# Patient Record
Sex: MALE | Race: WHITE | Employment: OTHER | URBAN - NONMETROPOLITAN AREA
[De-identification: names, ages, dates, MRNs, and addresses within clinical notes are randomized per-mention and may not be internally consistent; named-entity substitution may affect disease eponyms.]

---

## 2017-01-20 ENCOUNTER — OFFICE VISIT (OUTPATIENT)
Dept: FAMILY MEDICINE CLINIC | Age: 42
End: 2017-01-20

## 2017-01-20 VITALS
HEART RATE: 112 BPM | SYSTOLIC BLOOD PRESSURE: 136 MMHG | BODY MASS INDEX: 33.8 KG/M2 | HEIGHT: 68 IN | RESPIRATION RATE: 18 BRPM | WEIGHT: 223 LBS | DIASTOLIC BLOOD PRESSURE: 90 MMHG | OXYGEN SATURATION: 95 %

## 2017-01-20 DIAGNOSIS — E11.9 TYPE 2 DIABETES MELLITUS WITHOUT COMPLICATION, WITHOUT LONG-TERM CURRENT USE OF INSULIN (HCC): Primary | ICD-10-CM

## 2017-01-20 DIAGNOSIS — Z00.00 MEDICARE ANNUAL WELLNESS VISIT, INITIAL: ICD-10-CM

## 2017-01-20 DIAGNOSIS — F84.0 AUTISM DISORDER: ICD-10-CM

## 2017-01-20 DIAGNOSIS — E78.2 MIXED HYPERLIPIDEMIA: ICD-10-CM

## 2017-01-20 PROCEDURE — 1036F TOBACCO NON-USER: CPT | Performed by: FAMILY MEDICINE

## 2017-01-20 PROCEDURE — G8419 CALC BMI OUT NRM PARAM NOF/U: HCPCS | Performed by: FAMILY MEDICINE

## 2017-01-20 PROCEDURE — G8484 FLU IMMUNIZE NO ADMIN: HCPCS | Performed by: FAMILY MEDICINE

## 2017-01-20 PROCEDURE — G8427 DOCREV CUR MEDS BY ELIG CLIN: HCPCS | Performed by: FAMILY MEDICINE

## 2017-01-20 PROCEDURE — 99214 OFFICE O/P EST MOD 30 MIN: CPT | Performed by: FAMILY MEDICINE

## 2017-01-20 PROCEDURE — G0438 PPPS, INITIAL VISIT: HCPCS | Performed by: FAMILY MEDICINE

## 2017-01-20 PROCEDURE — 3045F PR MOST RECENT HEMOGLOBIN A1C LEVEL 7.0-9.0%: CPT | Performed by: FAMILY MEDICINE

## 2017-01-20 RX ORDER — METFORMIN HYDROCHLORIDE 500 MG/1
TABLET, EXTENDED RELEASE ORAL
Qty: 180 TABLET | Refills: 1 | Status: SHIPPED | OUTPATIENT
Start: 2017-01-20 | End: 2017-01-23 | Stop reason: SDUPTHER

## 2017-01-20 RX ORDER — ATORVASTATIN CALCIUM 20 MG/1
20 TABLET, FILM COATED ORAL DAILY
Qty: 90 TABLET | Refills: 1 | Status: SHIPPED | OUTPATIENT
Start: 2017-01-20 | End: 2017-01-23 | Stop reason: SDUPTHER

## 2017-01-20 ASSESSMENT — ENCOUNTER SYMPTOMS
EYE DISCHARGE: 0
ABDOMINAL PAIN: 0
SHORTNESS OF BREATH: 0
SINUS PRESSURE: 0
NAUSEA: 0
DIARRHEA: 0
EYE REDNESS: 0
CONSTIPATION: 0
COUGH: 0
RHINORRHEA: 0
SORE THROAT: 0
WHEEZING: 0
TROUBLE SWALLOWING: 0
VOMITING: 0

## 2017-01-23 RX ORDER — METFORMIN HYDROCHLORIDE 500 MG/1
TABLET, EXTENDED RELEASE ORAL
Qty: 180 TABLET | Refills: 1 | Status: SHIPPED | OUTPATIENT
Start: 2017-01-23 | End: 2017-07-21 | Stop reason: SDUPTHER

## 2017-01-23 RX ORDER — ATORVASTATIN CALCIUM 20 MG/1
20 TABLET, FILM COATED ORAL DAILY
Qty: 90 TABLET | Refills: 1 | Status: SHIPPED | OUTPATIENT
Start: 2017-01-23 | End: 2017-07-21 | Stop reason: SDUPTHER

## 2017-07-21 ENCOUNTER — OFFICE VISIT (OUTPATIENT)
Dept: FAMILY MEDICINE CLINIC | Age: 42
End: 2017-07-21
Payer: MEDICARE

## 2017-07-21 VITALS
HEIGHT: 68 IN | HEART RATE: 80 BPM | RESPIRATION RATE: 18 BRPM | BODY MASS INDEX: 33.49 KG/M2 | DIASTOLIC BLOOD PRESSURE: 80 MMHG | SYSTOLIC BLOOD PRESSURE: 126 MMHG | WEIGHT: 221 LBS

## 2017-07-21 DIAGNOSIS — E11.9 TYPE 2 DIABETES MELLITUS WITHOUT COMPLICATION, WITHOUT LONG-TERM CURRENT USE OF INSULIN (HCC): Primary | ICD-10-CM

## 2017-07-21 DIAGNOSIS — F84.0 AUTISM DISORDER: ICD-10-CM

## 2017-07-21 DIAGNOSIS — E78.2 MIXED HYPERLIPIDEMIA: ICD-10-CM

## 2017-07-21 PROCEDURE — 3046F HEMOGLOBIN A1C LEVEL >9.0%: CPT | Performed by: FAMILY MEDICINE

## 2017-07-21 PROCEDURE — 1036F TOBACCO NON-USER: CPT | Performed by: FAMILY MEDICINE

## 2017-07-21 PROCEDURE — G8427 DOCREV CUR MEDS BY ELIG CLIN: HCPCS | Performed by: FAMILY MEDICINE

## 2017-07-21 PROCEDURE — 99214 OFFICE O/P EST MOD 30 MIN: CPT | Performed by: FAMILY MEDICINE

## 2017-07-21 PROCEDURE — G8417 CALC BMI ABV UP PARAM F/U: HCPCS | Performed by: FAMILY MEDICINE

## 2017-07-21 RX ORDER — GLIMEPIRIDE 2 MG/1
2 TABLET ORAL
Qty: 90 TABLET | Refills: 1 | Status: SHIPPED | OUTPATIENT
Start: 2017-07-21 | End: 2018-05-04 | Stop reason: SDUPTHER

## 2017-07-21 RX ORDER — METFORMIN HYDROCHLORIDE 500 MG/1
500 TABLET, EXTENDED RELEASE ORAL 2 TIMES DAILY
Qty: 180 TABLET | Refills: 1 | Status: SHIPPED | OUTPATIENT
Start: 2017-07-21 | End: 2017-10-19

## 2017-07-21 RX ORDER — ATORVASTATIN CALCIUM 40 MG/1
40 TABLET, FILM COATED ORAL DAILY
Qty: 90 TABLET | Refills: 1 | Status: SHIPPED | OUTPATIENT
Start: 2017-07-21 | End: 2018-05-04 | Stop reason: SDUPTHER

## 2017-07-21 ASSESSMENT — ENCOUNTER SYMPTOMS
NAUSEA: 0
RHINORRHEA: 0
SORE THROAT: 0
WHEEZING: 0
COUGH: 0
SINUS PRESSURE: 0
DIARRHEA: 0
EYE DISCHARGE: 0
TROUBLE SWALLOWING: 0
CONSTIPATION: 0
VOMITING: 0
ABDOMINAL PAIN: 0
SHORTNESS OF BREATH: 0
EYE REDNESS: 0

## 2017-07-21 ASSESSMENT — PATIENT HEALTH QUESTIONNAIRE - PHQ9
1. LITTLE INTEREST OR PLEASURE IN DOING THINGS: 0
2. FEELING DOWN, DEPRESSED OR HOPELESS: 0
SUM OF ALL RESPONSES TO PHQ9 QUESTIONS 1 & 2: 0
SUM OF ALL RESPONSES TO PHQ QUESTIONS 1-9: 0

## 2017-07-24 DIAGNOSIS — E11.9 TYPE 2 DIABETES MELLITUS WITHOUT COMPLICATION, WITHOUT LONG-TERM CURRENT USE OF INSULIN (HCC): ICD-10-CM

## 2017-07-24 LAB
CREATININE URINE: 32.1 MG/DL (ref 39–259)
MICROALBUMIN/CREAT 24H UR: <12 MG/L
MICROALBUMIN/CREAT UR-RTO: ABNORMAL MCG/MG CREAT

## 2017-07-24 PROCEDURE — 36415 COLL VENOUS BLD VENIPUNCTURE: CPT | Performed by: FAMILY MEDICINE

## 2017-07-24 PROCEDURE — 82570 ASSAY OF URINE CREATININE: CPT | Performed by: FAMILY MEDICINE

## 2017-07-24 PROCEDURE — 82043 UR ALBUMIN QUANTITATIVE: CPT | Performed by: FAMILY MEDICINE

## 2017-10-19 RX ORDER — METFORMIN HYDROCHLORIDE 500 MG/1
1000 TABLET, EXTENDED RELEASE ORAL NIGHTLY
Qty: 180 TABLET | Refills: 0 | Status: SHIPPED | OUTPATIENT
Start: 2017-10-19 | End: 2018-05-04 | Stop reason: SDUPTHER

## 2018-02-09 ENCOUNTER — TELEPHONE (OUTPATIENT)
Dept: PRIMARY CARE CLINIC | Age: 43
End: 2018-02-09

## 2018-03-20 ENCOUNTER — TELEPHONE (OUTPATIENT)
Dept: PRIMARY CARE CLINIC | Age: 43
End: 2018-03-20

## 2018-05-04 ENCOUNTER — OFFICE VISIT (OUTPATIENT)
Dept: FAMILY MEDICINE CLINIC | Age: 43
End: 2018-05-04
Payer: MEDICARE

## 2018-05-04 VITALS
BODY MASS INDEX: 33.95 KG/M2 | DIASTOLIC BLOOD PRESSURE: 88 MMHG | HEART RATE: 84 BPM | HEIGHT: 68 IN | WEIGHT: 224 LBS | SYSTOLIC BLOOD PRESSURE: 138 MMHG | RESPIRATION RATE: 16 BRPM

## 2018-05-04 DIAGNOSIS — F84.0 AUTISM DISORDER: ICD-10-CM

## 2018-05-04 DIAGNOSIS — E11.9 CONTROLLED TYPE 2 DIABETES MELLITUS WITHOUT COMPLICATION, WITHOUT LONG-TERM CURRENT USE OF INSULIN (HCC): Primary | ICD-10-CM

## 2018-05-04 DIAGNOSIS — E78.2 MIXED HYPERLIPIDEMIA: ICD-10-CM

## 2018-05-04 PROCEDURE — 99214 OFFICE O/P EST MOD 30 MIN: CPT | Performed by: FAMILY MEDICINE

## 2018-05-04 PROCEDURE — 1036F TOBACCO NON-USER: CPT | Performed by: FAMILY MEDICINE

## 2018-05-04 PROCEDURE — G8417 CALC BMI ABV UP PARAM F/U: HCPCS | Performed by: FAMILY MEDICINE

## 2018-05-04 PROCEDURE — 3046F HEMOGLOBIN A1C LEVEL >9.0%: CPT | Performed by: FAMILY MEDICINE

## 2018-05-04 PROCEDURE — G8427 DOCREV CUR MEDS BY ELIG CLIN: HCPCS | Performed by: FAMILY MEDICINE

## 2018-05-04 PROCEDURE — 2022F DILAT RTA XM EVC RTNOPTHY: CPT | Performed by: FAMILY MEDICINE

## 2018-05-04 RX ORDER — METFORMIN HYDROCHLORIDE 500 MG/1
1000 TABLET, EXTENDED RELEASE ORAL NIGHTLY
Qty: 30 TABLET | Refills: 0 | Status: SHIPPED | OUTPATIENT
Start: 2018-05-04 | End: 2018-05-18 | Stop reason: SDUPTHER

## 2018-05-04 RX ORDER — GLIMEPIRIDE 2 MG/1
2 TABLET ORAL
Qty: 15 TABLET | Refills: 0 | Status: SHIPPED | OUTPATIENT
Start: 2018-05-04 | End: 2018-05-18 | Stop reason: DRUGHIGH

## 2018-05-04 RX ORDER — ATORVASTATIN CALCIUM 40 MG/1
40 TABLET, FILM COATED ORAL DAILY
Qty: 15 TABLET | Refills: 0 | Status: SHIPPED | OUTPATIENT
Start: 2018-05-04 | End: 2018-05-18 | Stop reason: SDUPTHER

## 2018-05-04 ASSESSMENT — ENCOUNTER SYMPTOMS
SHORTNESS OF BREATH: 0
NAUSEA: 0
COUGH: 0
RHINORRHEA: 0
WHEEZING: 0
DIARRHEA: 0
SORE THROAT: 0
EYE REDNESS: 0
EYE DISCHARGE: 0
SINUS PRESSURE: 0
CONSTIPATION: 0
TROUBLE SWALLOWING: 0
VOMITING: 0
ABDOMINAL PAIN: 0

## 2018-05-04 ASSESSMENT — PATIENT HEALTH QUESTIONNAIRE - PHQ9
SUM OF ALL RESPONSES TO PHQ9 QUESTIONS 1 & 2: 0
SUM OF ALL RESPONSES TO PHQ QUESTIONS 1-9: 0
1. LITTLE INTEREST OR PLEASURE IN DOING THINGS: 0
2. FEELING DOWN, DEPRESSED OR HOPELESS: 0

## 2018-05-17 ENCOUNTER — HOSPITAL ENCOUNTER (OUTPATIENT)
Dept: LAB | Age: 43
Setting detail: SPECIMEN
Discharge: HOME | End: 2018-05-17
Payer: MEDICARE

## 2018-05-17 DIAGNOSIS — E78.2 MIXED HYPERLIPIDEMIA: ICD-10-CM

## 2018-05-17 DIAGNOSIS — E11.9 TYPE 2 DIABETES MELLITUS WITHOUT COMPLICATION, WITHOUT LONG-TERM CURRENT USE OF INSULIN (HCC): ICD-10-CM

## 2018-05-17 LAB
ABSOLUTE EOS #: 0.1 K/UL (ref 0–0.4)
ABSOLUTE IMMATURE GRANULOCYTE: NORMAL K/UL (ref 0–0.3)
ABSOLUTE LYMPH #: 2 K/UL (ref 1–4.8)
ABSOLUTE MONO #: 0.7 K/UL (ref 0.1–1.2)
ALBUMIN SERPL-MCNC: 4.2 G/DL (ref 3.5–5.2)
ALBUMIN/GLOBULIN RATIO: 1.2 (ref 1–2.5)
ALP BLD-CCNC: 54 U/L (ref 40–129)
ALT SERPL-CCNC: 29 U/L (ref 5–41)
ANION GAP SERPL CALCULATED.3IONS-SCNC: 13 MMOL/L (ref 9–17)
AST SERPL-CCNC: 26 U/L
BASOPHILS # BLD: 1 % (ref 0–2)
BASOPHILS ABSOLUTE: 0.1 K/UL (ref 0–0.2)
BILIRUB SERPL-MCNC: 0.4 MG/DL (ref 0.3–1.2)
BUN BLDV-MCNC: 11 MG/DL (ref 6–20)
BUN/CREAT BLD: 15 (ref 9–20)
CALCIUM SERPL-MCNC: 9.7 MG/DL (ref 8.6–10.4)
CHLORIDE BLD-SCNC: 96 MMOL/L (ref 98–107)
CHOLESTEROL/HDL RATIO: 3.1
CHOLESTEROL: 198 MG/DL
CO2: 29 MMOL/L (ref 20–31)
CREAT SERPL-MCNC: 0.72 MG/DL (ref 0.7–1.2)
DIFFERENTIAL TYPE: NORMAL
EOSINOPHILS RELATIVE PERCENT: 2 % (ref 1–8)
ESTIMATED AVERAGE GLUCOSE: 235 MG/DL
GFR AFRICAN AMERICAN: >60 ML/MIN
GFR NON-AFRICAN AMERICAN: >60 ML/MIN
GFR SERPL CREATININE-BSD FRML MDRD: ABNORMAL ML/MIN/{1.73_M2}
GFR SERPL CREATININE-BSD FRML MDRD: ABNORMAL ML/MIN/{1.73_M2}
GLUCOSE BLD-MCNC: 202 MG/DL (ref 70–99)
HBA1C MFR BLD: 9.8 % (ref 4.8–5.9)
HCT VFR BLD CALC: 46 % (ref 41–53)
HDLC SERPL-MCNC: 63 MG/DL
HEMOGLOBIN: 15.7 G/DL (ref 13.5–17.5)
IMMATURE GRANULOCYTES: NORMAL %
LDL CHOLESTEROL: 105 MG/DL (ref 0–130)
LYMPHOCYTES # BLD: 23 % (ref 15–43)
MCH RBC QN AUTO: 30.6 PG (ref 26–34)
MCHC RBC AUTO-ENTMCNC: 34.1 G/DL (ref 31–37)
MCV RBC AUTO: 89.5 FL (ref 80–100)
MONOCYTES # BLD: 8 % (ref 6–14)
NRBC AUTOMATED: NORMAL PER 100 WBC
PDW BLD-RTO: 13 % (ref 11–14.5)
PLATELET # BLD: 213 K/UL (ref 140–450)
PLATELET ESTIMATE: NORMAL
PMV BLD AUTO: 8.9 FL (ref 6–12)
POTASSIUM SERPL-SCNC: 3.7 MMOL/L (ref 3.7–5.3)
RBC # BLD: 5.14 M/UL (ref 4.5–5.9)
RBC # BLD: NORMAL 10*6/UL
SEG NEUTROPHILS: 66 % (ref 44–74)
SEGMENTED NEUTROPHILS ABSOLUTE COUNT: 5.9 K/UL (ref 1.8–7.7)
SODIUM BLD-SCNC: 138 MMOL/L (ref 135–144)
TOTAL PROTEIN: 7.6 G/DL (ref 6.4–8.3)
TRIGL SERPL-MCNC: 149 MG/DL
VLDLC SERPL CALC-MCNC: NORMAL MG/DL (ref 1–30)
WBC # BLD: 8.7 K/UL (ref 3.5–11)
WBC # BLD: NORMAL 10*3/UL

## 2018-05-17 PROCEDURE — 85025 COMPLETE CBC W/AUTO DIFF WBC: CPT

## 2018-05-17 PROCEDURE — 80053 COMPREHEN METABOLIC PANEL: CPT

## 2018-05-17 PROCEDURE — 83036 HEMOGLOBIN GLYCOSYLATED A1C: CPT

## 2018-05-17 PROCEDURE — 36415 COLL VENOUS BLD VENIPUNCTURE: CPT

## 2018-05-17 PROCEDURE — 80061 LIPID PANEL: CPT

## 2018-05-18 RX ORDER — GLIMEPIRIDE 4 MG/1
4 TABLET ORAL EVERY MORNING
Qty: 90 TABLET | Refills: 1 | Status: SHIPPED | OUTPATIENT
Start: 2018-05-18 | End: 2018-12-10 | Stop reason: SDUPTHER

## 2018-05-18 RX ORDER — ATORVASTATIN CALCIUM 40 MG/1
40 TABLET, FILM COATED ORAL DAILY
Qty: 90 TABLET | Refills: 1 | Status: SHIPPED | OUTPATIENT
Start: 2018-05-18 | End: 2018-12-10 | Stop reason: SDUPTHER

## 2018-05-18 RX ORDER — METFORMIN HYDROCHLORIDE 500 MG/1
1000 TABLET, EXTENDED RELEASE ORAL NIGHTLY
Qty: 180 TABLET | Refills: 1 | Status: SHIPPED | OUTPATIENT
Start: 2018-05-18 | End: 2018-12-10 | Stop reason: SDUPTHER

## 2018-09-27 PROBLEM — Z00.00 MEDICARE ANNUAL WELLNESS VISIT, INITIAL: Status: RESOLVED | Noted: 2017-01-20 | Resolved: 2018-09-27

## 2018-11-08 ENCOUNTER — TELEPHONE (OUTPATIENT)
Dept: PRIMARY CARE CLINIC | Age: 43
End: 2018-11-08

## 2018-12-10 ENCOUNTER — OFFICE VISIT (OUTPATIENT)
Dept: FAMILY MEDICINE CLINIC | Age: 43
End: 2018-12-10
Payer: MEDICARE

## 2018-12-10 VITALS
RESPIRATION RATE: 18 BRPM | WEIGHT: 216 LBS | BODY MASS INDEX: 32.74 KG/M2 | HEIGHT: 68 IN | DIASTOLIC BLOOD PRESSURE: 84 MMHG | SYSTOLIC BLOOD PRESSURE: 120 MMHG

## 2018-12-10 DIAGNOSIS — E78.2 MIXED HYPERLIPIDEMIA: ICD-10-CM

## 2018-12-10 DIAGNOSIS — E11.9 CONTROLLED TYPE 2 DIABETES MELLITUS WITHOUT COMPLICATION, WITHOUT LONG-TERM CURRENT USE OF INSULIN (HCC): Primary | ICD-10-CM

## 2018-12-10 PROCEDURE — 99213 OFFICE O/P EST LOW 20 MIN: CPT | Performed by: FAMILY MEDICINE

## 2018-12-10 PROCEDURE — 3046F HEMOGLOBIN A1C LEVEL >9.0%: CPT | Performed by: FAMILY MEDICINE

## 2018-12-10 PROCEDURE — 1036F TOBACCO NON-USER: CPT | Performed by: FAMILY MEDICINE

## 2018-12-10 PROCEDURE — G8427 DOCREV CUR MEDS BY ELIG CLIN: HCPCS | Performed by: FAMILY MEDICINE

## 2018-12-10 PROCEDURE — 2022F DILAT RTA XM EVC RTNOPTHY: CPT | Performed by: FAMILY MEDICINE

## 2018-12-10 PROCEDURE — G8484 FLU IMMUNIZE NO ADMIN: HCPCS | Performed by: FAMILY MEDICINE

## 2018-12-10 PROCEDURE — G8417 CALC BMI ABV UP PARAM F/U: HCPCS | Performed by: FAMILY MEDICINE

## 2018-12-10 RX ORDER — ATORVASTATIN CALCIUM 40 MG/1
40 TABLET, FILM COATED ORAL DAILY
Qty: 90 TABLET | Refills: 1 | Status: SHIPPED | OUTPATIENT
Start: 2018-12-10 | End: 2019-03-25 | Stop reason: SDUPTHER

## 2018-12-10 RX ORDER — METFORMIN HYDROCHLORIDE 500 MG/1
1000 TABLET, EXTENDED RELEASE ORAL NIGHTLY
Qty: 180 TABLET | Refills: 1 | Status: SHIPPED | OUTPATIENT
Start: 2018-12-10 | End: 2019-06-11 | Stop reason: SDUPTHER

## 2018-12-10 RX ORDER — GLIMEPIRIDE 4 MG/1
4 TABLET ORAL EVERY MORNING
Qty: 90 TABLET | Refills: 1 | Status: SHIPPED | OUTPATIENT
Start: 2018-12-10 | End: 2019-03-25 | Stop reason: SDUPTHER

## 2018-12-10 ASSESSMENT — PATIENT HEALTH QUESTIONNAIRE - PHQ9
SUM OF ALL RESPONSES TO PHQ QUESTIONS 1-9: 0
SUM OF ALL RESPONSES TO PHQ9 QUESTIONS 1 & 2: 0
1. LITTLE INTEREST OR PLEASURE IN DOING THINGS: 0
SUM OF ALL RESPONSES TO PHQ QUESTIONS 1-9: 0
2. FEELING DOWN, DEPRESSED OR HOPELESS: 0

## 2018-12-10 NOTE — PATIENT INSTRUCTIONS
Patient Education        Diabetes Foot Health: Care Instructions  Your Care Instructions    When you have diabetes, your feet need extra care and attention. Diabetes can damage the nerve endings and blood vessels in your feet, making you less likely to notice when your feet are injured. Diabetes also limits your body's ability to fight infection and get blood to areas that need it. If you get a minor foot injury, it could become an ulcer or a serious infection. With good foot care, you can prevent most of these problems. Caring for your feet can be quick and easy. Most of the care can be done when you are bathing or getting ready for bed. Follow-up care is a key part of your treatment and safety. Be sure to make and go to all appointments, and call your doctor if you are having problems. It's also a good idea to know your test results and keep a list of the medicines you take. How can you care for yourself at home? · Keep your blood sugar close to normal by watching what and how much you eat, monitoring blood sugar, taking medicines if prescribed, and getting regular exercise. · Do not smoke. Smoking affects blood flow and can make foot problems worse. If you need help quitting, talk to your doctor about stop-smoking programs and medicines. These can increase your chances of quitting for good. · Eat a diet that is low in fats. High fat intake can cause fat to build up in your blood vessels and decrease blood flow. · Inspect your feet daily for blisters, cuts, cracks, or sores. If you cannot see well, use a mirror or have someone help you. · Take care of your feet:  ? Wash your feet every day. Use warm (not hot) water. Check the water temperature with your wrists or other part of your body, not your feet. ? Dry your feet well. Pat them dry. Do not rub the skin on your feet too hard. Dry well between your toes.  If the skin on your feet stays moist, bacteria or a fungus can grow, which can lead to

## 2018-12-10 NOTE — PROGRESS NOTES
Patient/sister declines hiv screening; flu, pneumonia, and tdap vaccines. Activated myChart account today. Has not had eye exam as he will not sit still long enough to complete.

## 2018-12-14 ASSESSMENT — ENCOUNTER SYMPTOMS
SINUS PRESSURE: 0
DIARRHEA: 0
EYE DISCHARGE: 0
TROUBLE SWALLOWING: 0
VOMITING: 0
NAUSEA: 0
SORE THROAT: 0
WHEEZING: 0
EYE REDNESS: 0
COUGH: 0
ABDOMINAL PAIN: 0
RHINORRHEA: 0
CONSTIPATION: 0
SHORTNESS OF BREATH: 0

## 2018-12-14 NOTE — PROGRESS NOTES
12/10/2018     Tariq Melendez (:  1975) is a 37 y.o. male, here for evaluation of the following medical concerns:    HPI  Patient comes in today for follow up of chronic health issues   Chief Complaint   Patient presents with    Diabetes     6 mo f/u; foot exam; planned visit    Hyperlipidemia     6 mo f/u    Other     autism; 6 mo f/u   . Patient Comes in today with his sister Marsha Verma who is now his guardian. His mom who had cared for him for his entire life has passed away and so now he is under the care of his sister. He is doing relatively well adapting. They did not get his lab work done prior to his office visit but his sister notes she will get it in the near future. She notes that he does not really follow any specific dietary plan. He eats quite a bit daily. She states that he eats spaghetti O's out of the can quite frequently. Needs a fairly high carbohydrate rich diet. She is making sure that he is consistently taking his medication so we will see what the status of his diabetes is with his updated lab work. As he is autistic it is difficult to get him to eat healthy dietary intake as he is used to certain foods that he likes and does not like to deviate from that. She continues to work to try to get him to eat healthier options. Does have a known history of hyperlipidemia and is taking Lipitor without any difficulty. His last labs in May did show that his cholesterol was reasonably well-controlled on his current dose. Again will need reassessment with updated lab work which she is going to get in the next 2-3 weeks duration. Otherwise today no other acute medical concerns. .  Patient's recent lab reports are as follows:    Results for orders placed or performed during the hospital encounter of 18   CBC Auto Differential   Result Value Ref Range    WBC 8.7 3.5 - 11.0 k/uL    RBC 5.14 4.5 - 5.9 m/uL    Hemoglobin 15.7 13.5 - 17.5 g/dL    Hematocrit 46.0 41 - 53 %    MCV 89.5 noted below. Did review patient's med list, allergies, social history, fam history, pmhx and pshx today as noted in the record. Preventative measures are reviewed today. See health maintenance section for complete preventative plan of care. Review of Systems   Constitutional: Negative for chills, fatigue and fever. HENT: Negative for congestion, ear pain, postnasal drip, rhinorrhea, sinus pressure, sore throat and trouble swallowing. Eyes: Negative for discharge and redness. Respiratory: Negative for cough, shortness of breath and wheezing. Cardiovascular: Negative for chest pain. Gastrointestinal: Negative for abdominal pain, constipation, diarrhea, nausea and vomiting. Musculoskeletal: Negative for arthralgias, myalgias and neck pain. Skin: Negative for rash and wound. Allergic/Immunologic: Negative for environmental allergies. Neurological: Negative for dizziness, weakness, light-headedness and headaches. Hematological: Negative for adenopathy. Psychiatric/Behavioral: Negative. Prior to Visit Medications    Medication Sig Taking? Authorizing Provider   glimepiride (AMARYL) 4 MG tablet Take 1 tablet by mouth every morning Yes Jaz Rutherford DO   atorvastatin (LIPITOR) 40 MG tablet Take 1 tablet by mouth daily Yes Jaz Rutherford DO   metFORMIN (GLUCOPHAGE-XR) 500 MG extended release tablet Take 2 tablets by mouth nightly Yes Jaz Rutherford, DO        Social History   Substance Use Topics    Smoking status: Never Smoker    Smokeless tobacco: Never Used    Alcohol use No        Vitals:    12/10/18 1610   BP: 120/84   Site: Left Upper Arm   Position: Sitting   Cuff Size: Large Adult   Pulse: Comment: patient uncooperative   Resp: 18   Weight: 216 lb (98 kg)   Height: 5' 8\" (1.727 m)     Estimated body mass index is 32.84 kg/m² as calculated from the following:    Height as of this encounter: 5' 8\" (1.727 m).     Weight as of this encounter: 216 lb (98

## 2018-12-17 ENCOUNTER — TELEPHONE (OUTPATIENT)
Dept: PRIMARY CARE CLINIC | Age: 43
End: 2018-12-17

## 2019-01-28 ENCOUNTER — TELEPHONE (OUTPATIENT)
Dept: PRIMARY CARE CLINIC | Age: 44
End: 2019-01-28

## 2019-03-08 ENCOUNTER — TELEPHONE (OUTPATIENT)
Dept: PRIMARY CARE CLINIC | Age: 44
End: 2019-03-08

## 2019-03-25 RX ORDER — ATORVASTATIN CALCIUM 40 MG/1
40 TABLET, FILM COATED ORAL DAILY
Qty: 90 TABLET | Refills: 0 | Status: SHIPPED | OUTPATIENT
Start: 2019-03-25 | End: 2019-06-11 | Stop reason: SDUPTHER

## 2019-03-25 RX ORDER — GLIMEPIRIDE 4 MG/1
4 TABLET ORAL EVERY MORNING
Qty: 90 TABLET | Refills: 0 | Status: SHIPPED | OUTPATIENT
Start: 2019-03-25 | End: 2019-06-11 | Stop reason: SDUPTHER

## 2019-06-07 ENCOUNTER — HOSPITAL ENCOUNTER (OUTPATIENT)
Dept: LAB | Age: 44
Discharge: HOME | End: 2019-06-07
Payer: MEDICARE

## 2019-06-07 DIAGNOSIS — E11.9 CONTROLLED TYPE 2 DIABETES MELLITUS WITHOUT COMPLICATION, WITHOUT LONG-TERM CURRENT USE OF INSULIN (HCC): Primary | ICD-10-CM

## 2019-06-07 DIAGNOSIS — E78.2 MIXED HYPERLIPIDEMIA: ICD-10-CM

## 2019-06-07 DIAGNOSIS — R03.0 ELEVATED BLOOD PRESSURE READING: ICD-10-CM

## 2019-06-07 DIAGNOSIS — E11.9 CONTROLLED TYPE 2 DIABETES MELLITUS WITHOUT COMPLICATION, WITHOUT LONG-TERM CURRENT USE OF INSULIN (HCC): ICD-10-CM

## 2019-06-07 LAB
ABSOLUTE EOS #: 0.1 K/UL (ref 0–0.4)
ABSOLUTE IMMATURE GRANULOCYTE: NORMAL K/UL (ref 0–0.3)
ABSOLUTE LYMPH #: 1.5 K/UL (ref 1–4.8)
ABSOLUTE MONO #: 0.5 K/UL (ref 0.1–1.2)
ALBUMIN SERPL-MCNC: 4.7 G/DL (ref 3.5–5.2)
ALBUMIN/GLOBULIN RATIO: 1.4 (ref 1–2.5)
ALP BLD-CCNC: 51 U/L (ref 40–129)
ALT SERPL-CCNC: 16 U/L (ref 5–41)
ANION GAP SERPL CALCULATED.3IONS-SCNC: 11 MMOL/L (ref 9–17)
AST SERPL-CCNC: 20 U/L
BASOPHILS # BLD: 1 % (ref 0–2)
BASOPHILS ABSOLUTE: 0 K/UL (ref 0–0.2)
BILIRUB SERPL-MCNC: 0.51 MG/DL (ref 0.3–1.2)
BUN BLDV-MCNC: 12 MG/DL (ref 6–20)
BUN/CREAT BLD: 16 (ref 9–20)
CALCIUM SERPL-MCNC: 10.1 MG/DL (ref 8.6–10.4)
CHLORIDE BLD-SCNC: 100 MMOL/L (ref 98–107)
CHOLESTEROL/HDL RATIO: 2
CHOLESTEROL: 129 MG/DL
CO2: 27 MMOL/L (ref 20–31)
CREAT SERPL-MCNC: 0.74 MG/DL (ref 0.7–1.2)
CREATININE URINE: 131 MG/DL (ref 39–259)
DIFFERENTIAL TYPE: NORMAL
EOSINOPHILS RELATIVE PERCENT: 1 % (ref 1–8)
ESTIMATED AVERAGE GLUCOSE: 143 MG/DL
GFR AFRICAN AMERICAN: >60 ML/MIN
GFR NON-AFRICAN AMERICAN: >60 ML/MIN
GFR SERPL CREATININE-BSD FRML MDRD: ABNORMAL ML/MIN/{1.73_M2}
GFR SERPL CREATININE-BSD FRML MDRD: ABNORMAL ML/MIN/{1.73_M2}
GLUCOSE BLD-MCNC: 106 MG/DL (ref 70–99)
HBA1C MFR BLD: 6.6 % (ref 4.8–5.9)
HCT VFR BLD CALC: 49.4 % (ref 41–53)
HDLC SERPL-MCNC: 63 MG/DL
HEMOGLOBIN: 16.6 G/DL (ref 13.5–17.5)
IMMATURE GRANULOCYTES: NORMAL %
LDL CHOLESTEROL: 45 MG/DL (ref 0–130)
LYMPHOCYTES # BLD: 21 % (ref 15–43)
MCH RBC QN AUTO: 30.5 PG (ref 26–34)
MCHC RBC AUTO-ENTMCNC: 33.6 G/DL (ref 31–37)
MCV RBC AUTO: 90.7 FL (ref 80–100)
MICROALBUMIN/CREAT 24H UR: <12 MG/L
MICROALBUMIN/CREAT UR-RTO: NORMAL MCG/MG CREAT
MONOCYTES # BLD: 7 % (ref 6–14)
NRBC AUTOMATED: NORMAL PER 100 WBC
PDW BLD-RTO: 13.3 % (ref 11–14.5)
PLATELET # BLD: 257 K/UL (ref 140–450)
PLATELET ESTIMATE: NORMAL
PMV BLD AUTO: 7.8 FL (ref 6–12)
POTASSIUM SERPL-SCNC: 4 MMOL/L (ref 3.7–5.3)
RBC # BLD: 5.45 M/UL (ref 4.5–5.9)
RBC # BLD: NORMAL 10*6/UL
SEG NEUTROPHILS: 70 % (ref 44–74)
SEGMENTED NEUTROPHILS ABSOLUTE COUNT: 5 K/UL (ref 1.8–7.7)
SODIUM BLD-SCNC: 138 MMOL/L (ref 135–144)
TOTAL PROTEIN: 8.1 G/DL (ref 6.4–8.3)
TRIGL SERPL-MCNC: 106 MG/DL
VLDLC SERPL CALC-MCNC: NORMAL MG/DL (ref 1–30)
WBC # BLD: 7.1 K/UL (ref 3.5–11)
WBC # BLD: NORMAL 10*3/UL

## 2019-06-07 PROCEDURE — 85025 COMPLETE CBC W/AUTO DIFF WBC: CPT

## 2019-06-07 PROCEDURE — 80061 LIPID PANEL: CPT

## 2019-06-07 PROCEDURE — 36415 COLL VENOUS BLD VENIPUNCTURE: CPT

## 2019-06-07 PROCEDURE — 82570 ASSAY OF URINE CREATININE: CPT

## 2019-06-07 PROCEDURE — 83036 HEMOGLOBIN GLYCOSYLATED A1C: CPT

## 2019-06-07 PROCEDURE — 82043 UR ALBUMIN QUANTITATIVE: CPT

## 2019-06-07 PROCEDURE — 80053 COMPREHEN METABOLIC PANEL: CPT

## 2019-06-11 ENCOUNTER — OFFICE VISIT (OUTPATIENT)
Dept: FAMILY MEDICINE CLINIC | Age: 44
End: 2019-06-11
Payer: MEDICARE

## 2019-06-11 VITALS
SYSTOLIC BLOOD PRESSURE: 138 MMHG | RESPIRATION RATE: 18 BRPM | BODY MASS INDEX: 30.92 KG/M2 | HEART RATE: 92 BPM | HEIGHT: 68 IN | DIASTOLIC BLOOD PRESSURE: 84 MMHG | WEIGHT: 204 LBS

## 2019-06-11 DIAGNOSIS — E78.2 MIXED HYPERLIPIDEMIA: ICD-10-CM

## 2019-06-11 DIAGNOSIS — F84.0 AUTISM: ICD-10-CM

## 2019-06-11 DIAGNOSIS — E11.9 CONTROLLED TYPE 2 DIABETES MELLITUS WITHOUT COMPLICATION, WITHOUT LONG-TERM CURRENT USE OF INSULIN (HCC): Primary | ICD-10-CM

## 2019-06-11 DIAGNOSIS — Z00.00 MEDICARE ANNUAL WELLNESS VISIT, SUBSEQUENT: ICD-10-CM

## 2019-06-11 PROCEDURE — G0439 PPPS, SUBSEQ VISIT: HCPCS | Performed by: FAMILY MEDICINE

## 2019-06-11 PROCEDURE — 99213 OFFICE O/P EST LOW 20 MIN: CPT | Performed by: FAMILY MEDICINE

## 2019-06-11 PROCEDURE — 2022F DILAT RTA XM EVC RTNOPTHY: CPT | Performed by: FAMILY MEDICINE

## 2019-06-11 PROCEDURE — G8427 DOCREV CUR MEDS BY ELIG CLIN: HCPCS | Performed by: FAMILY MEDICINE

## 2019-06-11 PROCEDURE — 1036F TOBACCO NON-USER: CPT | Performed by: FAMILY MEDICINE

## 2019-06-11 PROCEDURE — 3044F HG A1C LEVEL LT 7.0%: CPT | Performed by: FAMILY MEDICINE

## 2019-06-11 PROCEDURE — G8417 CALC BMI ABV UP PARAM F/U: HCPCS | Performed by: FAMILY MEDICINE

## 2019-06-11 RX ORDER — METFORMIN HYDROCHLORIDE 500 MG/1
1000 TABLET, EXTENDED RELEASE ORAL NIGHTLY
Qty: 180 TABLET | Refills: 1 | Status: SHIPPED | OUTPATIENT
Start: 2019-06-11 | End: 2019-12-20 | Stop reason: SDUPTHER

## 2019-06-11 RX ORDER — GLIMEPIRIDE 4 MG/1
4 TABLET ORAL EVERY MORNING
Qty: 90 TABLET | Refills: 0 | Status: SHIPPED | OUTPATIENT
Start: 2019-06-11 | End: 2019-09-21 | Stop reason: SDUPTHER

## 2019-06-11 RX ORDER — ATORVASTATIN CALCIUM 40 MG/1
40 TABLET, FILM COATED ORAL DAILY
Qty: 90 TABLET | Refills: 0 | Status: SHIPPED | OUTPATIENT
Start: 2019-06-11 | End: 2019-09-21 | Stop reason: SDUPTHER

## 2019-06-11 ASSESSMENT — ENCOUNTER SYMPTOMS
COUGH: 0
EYE DISCHARGE: 0
RHINORRHEA: 0
CONSTIPATION: 0
SORE THROAT: 0
TROUBLE SWALLOWING: 0
DIARRHEA: 0
VOMITING: 0
EYE REDNESS: 0
ABDOMINAL PAIN: 0
SHORTNESS OF BREATH: 0
NAUSEA: 0
WHEEZING: 0
SINUS PRESSURE: 0

## 2019-06-11 ASSESSMENT — PATIENT HEALTH QUESTIONNAIRE - PHQ9
SUM OF ALL RESPONSES TO PHQ QUESTIONS 1-9: 0
SUM OF ALL RESPONSES TO PHQ QUESTIONS 1-9: 0

## 2019-06-11 ASSESSMENT — LIFESTYLE VARIABLES: HOW OFTEN DO YOU HAVE A DRINK CONTAINING ALCOHOL: 0

## 2019-06-11 ASSESSMENT — ANXIETY QUESTIONNAIRES: GAD7 TOTAL SCORE: 0

## 2019-06-11 NOTE — PROGRESS NOTES
did you need help from others to take care of any of the following? Laundry, housekeeping, banking/finances, shopping, telephone use, food preparation, transportation, or taking medications?: [employer], Housekeeping, Banking/Finances, Shopping, Telephone Use, Food Preparation, Transportation, Taking Medications  ADL Interventions:  · sister is guardian and caregiver. Assists with ADLs and IADLs    Personalized Preventive Plan   Current Health Maintenance Status    There is no immunization history on file for this patient. Health Maintenance   Topic Date Due    Pneumococcal 0-64 years Vaccine (1 of 1 - PPSV23) Sister declined    Diabetic retinal exam  Sister declined. Too difficult to assess patient due to autism    Hepatitis B Vaccine (1 of 3 - Risk 3-dose series) Sister declined    DTaP/Tdap/Td vaccine (1 - Tdap) Sister declined    Diabetic foot exam  Performed today    Flu vaccine (Season Ended) 09/01/2019    A1C test (Diabetic or Prediabetic)  06/07/2020    Diabetic microalbuminuria test  06/07/2020    Lipid screen  06/07/2020    HIV screen  Discontinued     Recommendations for Preventive Services Due: see orders and patient instructions/AVS.  .   Recommended screening schedule for the next 5-10 years is provided to the patient in written form: see Patient Instructions/AVS.

## 2019-06-11 NOTE — PROGRESS NOTES
2019     Marlon Monterroso (:  1975) is a 37 y.o. male, here for evaluation of the following medical concerns:    HPI  Patient comes in today for follow up of chronic health issues   Chief Complaint   Patient presents with    Medicare AWV     subsequent; last 17    Diabetes     6 mo f/u    Hyperlipidemia     6 mo f/u    Other     autism; 6 mo f/u    Discuss Labs     drawn 19   . Patient comes in today for follow-up of his chronic health conditions. Does come in today with his sister who is his guardian and caregiver. She does not report any acute issues at this time. He has been more active as his sister makes him do more physical activity and she does try to make him follow a healthy diet. As a result his diabetes mellitus type 2 is controlled with his current medical regimen. He is really been better with dietary intake thanks to his sister's dietary assistance. Does have a known history of hyperlipidemia and his cholesterol levels are stable and controlled on his current lipid-lowering medication. He is autistic but seems to be adapting well to his sister taking care of him since his mother . Otherwise today no other acute medical concerns. .  Patient's recent lab reports are as follows:    Results for orders placed or performed during the hospital encounter of 19   Comprehensive Metabolic Panel   Result Value Ref Range    Glucose 106 (H) 70 - 99 mg/dL    BUN 12 6 - 20 mg/dL    CREATININE 0.74 0.70 - 1.20 mg/dL    Bun/Cre Ratio 16 9 - 20    Calcium 10.1 8.6 - 10.4 mg/dL    Sodium 138 135 - 144 mmol/L    Potassium 4.0 3.7 - 5.3 mmol/L    Chloride 100 98 - 107 mmol/L    CO2 27 20 - 31 mmol/L    Anion Gap 11 9 - 17 mmol/L    Alkaline Phosphatase 51 40 - 129 U/L    ALT 16 5 - 41 U/L    AST 20 <40 U/L    Total Bilirubin 0.51 0.3 - 1.2 mg/dL    Total Protein 8.1 6.4 - 8.3 g/dL    Alb 4.7 3.5 - 5.2 g/dL    Albumin/Globulin Ratio 1.4 1.0 - 2.5    GFR Non-African American >60 >60 mL/min    GFR African American >60 >60 mL/min    GFR Comment          GFR Staging NOT REPORTED    CBC With Auto Differential   Result Value Ref Range    WBC 7.1 3.5 - 11.0 k/uL    RBC 5.45 4.5 - 5.9 m/uL    Hemoglobin 16.6 13.5 - 17.5 g/dL    Hematocrit 49.4 41 - 53 %    MCV 90.7 80 - 100 fL    MCH 30.5 26 - 34 pg    MCHC 33.6 31 - 37 g/dL    RDW 13.3 11.0 - 14.5 %    Platelets 910 693 - 311 k/uL    MPV 7.8 6.0 - 12.0 fL    NRBC Automated NOT REPORTED per 100 WBC    Differential Type NOT REPORTED     Immature Granulocytes NOT REPORTED 0 %    Absolute Immature Granulocyte NOT REPORTED 0.00 - 0.30 k/uL    WBC Morphology NOT REPORTED     RBC Morphology NOT REPORTED     Platelet Estimate NOT REPORTED     Seg Neutrophils 70 44 - 74 %    Lymphocytes 21 15 - 43 %    Monocytes 7 6 - 14 %    Eosinophils % 1 1 - 8 %    Basophils 1 0 - 2 %    Segs Absolute 5.00 1.8 - 7.7 k/uL    Absolute Lymph # 1.50 1.0 - 4.8 k/uL    Absolute Mono # 0.50 0.1 - 1.2 k/uL    Absolute Eos # 0.10 0.0 - 0.4 k/uL    Basophils # 0.00 0.0 - 0.2 k/uL   Lipid Panel   Result Value Ref Range    Cholesterol 129 <200 mg/dL    HDL 63 >40 mg/dL    LDL Cholesterol 45 0 - 130 mg/dL    Chol/HDL Ratio 2.0 <5    Triglycerides 106 <150 mg/dL    VLDL NOT REPORTED 1 - 30 mg/dL   Hemoglobin A1C   Result Value Ref Range    Hemoglobin A1C 6.6 (H) 4.8 - 5.9 %    Estimated Avg Glucose 143 mg/dL   Microalbumin, Ur   Result Value Ref Range    Microalb, Ur <12 <21 mg/L    Creatinine, Ur 131.0 39.0 - 259.0 mg/dL    Microalb/Crt. Ratio CANNOT BE CALCULATED <17 mcg/mg creat     Did discuss dietary modification and increased exercise to keep cholesterol and blood sugar under good control. Other review of systems are as noted below. Did review patient's med list, allergies, social history, fam history, pmhx and pshx today as noted in the record. Preventative measures are reviewed today. See health maintenance section for complete preventative plan of care.       Review of normal.   Nose: Nose normal.   Mouth/Throat: Oropharynx is clear and moist. No oropharyngeal exudate. Eyes: Pupils are equal, round, and reactive to light. Conjunctivae and EOM are normal. Right eye exhibits no discharge. Left eye exhibits no discharge. Neck: Normal range of motion. Neck supple. No thyromegaly present. Cardiovascular: Normal rate, regular rhythm and normal heart sounds. Pulmonary/Chest: Effort normal and breath sounds normal. He has no wheezes. He has no rales. Abdominal: Soft. Bowel sounds are normal.   Musculoskeletal: He exhibits no edema. Patient had a diabetic foot exam today. No open areas or ulcerations noted. Fine filament testing to the entire dorsal and plantar aspect of the foot reveals good sensation in all areas. No cyanosis. +2/4 pedal pulses, symmetric bilaterally   Lymphadenopathy:     He has no cervical adenopathy. Neurological: He is alert and oriented to person, place, and time. Skin: Skin is warm and dry. No rash noted. He is not diaphoretic. Psychiatric: He has a normal mood and affect. His behavior is normal. Judgment and thought content normal.   Nursing note and vitals reviewed. ASSESSMENT/PLAN:  1. Controlled type 2 diabetes mellitus without complication, without long-term current use of insulin (HCC)  Stable and controlled on his current medical regimen and with dietary efforts. Continued low-carb/low sugar diet and increased exercise encouraged. -  DIABETES FOOT EXAM  - CBC Auto Differential; Future  - Comprehensive Metabolic Panel; Future  - Hemoglobin A1C; Future    2. Mixed hyperlipidemia  Stable controlled on his current statin medication. Is tolerating this without difficulty. - Lipid Panel; Future    3. Autism  Sister is guardian and primary care giver. Stable. 4. Medicare annual wellness visit, subsequent  See separate annual wellness visit note for review.     Orders Placed This Encounter   Medications    atorvastatin (LIPITOR) 40 MG tablet     Sig: Take 1 tablet by mouth daily     Dispense:  90 tablet     Refill:  0    glimepiride (AMARYL) 4 MG tablet     Sig: Take 1 tablet by mouth every morning     Dispense:  90 tablet     Refill:  0    metFORMIN (GLUCOPHAGE-XR) 500 MG extended release tablet     Sig: Take 2 tablets by mouth nightly     Dispense:  180 tablet     Refill:  1     Orders Placed This Encounter   Procedures    CBC Auto Differential     To be done in 6 months     Standing Status:   Future     Standing Expiration Date:   6/10/2020    Comprehensive Metabolic Panel     To be done in 6 months     Standing Status:   Future     Standing Expiration Date:   6/10/2020    Hemoglobin A1C     To be done in 6 months     Standing Status:   Future     Standing Expiration Date:   6/10/2020    Lipid Panel     To be done in 6 months     Standing Status:   Future     Standing Expiration Date:   6/10/2020     Order Specific Question:   Is Patient Fasting?/# of Hours     Answer:   12 hours     DIABETES FOOT EXAM     Patient is to return to my office in 6 months duration or sooner if any further problems or symptoms arise. (Please note that portions of this note were completed with a voice recognition program. Efforts were made to edit the dictations but occasionally words are mis-transcribed.)          Return in about 6 months (around 12/11/2019). An electronic signature was used to authenticate this note.     --Raphael Wynne, DO on 6/11/2019 at 6:05 PM

## 2019-06-11 NOTE — PROGRESS NOTES
Patient declines pneumonia, tdap, and hep b vaccines. Declines hiv screening.  Unable to effectively complete an eye exam.

## 2019-06-11 NOTE — PATIENT INSTRUCTIONS
Hospital Outpatient Visit on 06/07/2019   Component Date Value Ref Range Status    Glucose 06/07/2019 106* 70 - 99 mg/dL Final    BUN 06/07/2019 12  6 - 20 mg/dL Final    CREATININE 06/07/2019 0.74  0.70 - 1.20 mg/dL Final    Bun/Cre Ratio 06/07/2019 16  9 - 20 Final    Calcium 06/07/2019 10.1  8.6 - 10.4 mg/dL Final    Sodium 06/07/2019 138  135 - 144 mmol/L Final    Potassium 06/07/2019 4.0  3.7 - 5.3 mmol/L Final    Chloride 06/07/2019 100  98 - 107 mmol/L Final    CO2 06/07/2019 27  20 - 31 mmol/L Final    Anion Gap 06/07/2019 11  9 - 17 mmol/L Final    Alkaline Phosphatase 06/07/2019 51  40 - 129 U/L Final    ALT 06/07/2019 16  5 - 41 U/L Final    AST 06/07/2019 20  <40 U/L Final    Total Bilirubin 06/07/2019 0.51  0.3 - 1.2 mg/dL Final    Total Protein 06/07/2019 8.1  6.4 - 8.3 g/dL Final    Alb 06/07/2019 4.7  3.5 - 5.2 g/dL Final    Albumin/Globulin Ratio 06/07/2019 1.4  1.0 - 2.5 Final    GFR Non- 06/07/2019 >60  >60 mL/min Final    GFR  06/07/2019 >60  >60 mL/min Final    GFR Comment 06/07/2019        Final    Comment: Average GFR for 38-51 years old:   80 mL/min/1.73sq m  Chronic Kidney Disease:   <60 mL/min/1.73sq m  Kidney failure:   <15 mL/min/1.73sq m              eGFR calculated using average adult body mass.  Additional eGFR calculator available at:        [email or web address]            GFR Staging 06/07/2019 NOT REPORTED   Final    WBC 06/07/2019 7.1  3.5 - 11.0 k/uL Final    RBC 06/07/2019 5.45  4.5 - 5.9 m/uL Final    Hemoglobin 06/07/2019 16.6  13.5 - 17.5 g/dL Final    Hematocrit 06/07/2019 49.4  41 - 53 % Final    MCV 06/07/2019 90.7  80 - 100 fL Final    MCH 06/07/2019 30.5  26 - 34 pg Final    MCHC 06/07/2019 33.6  31 - 37 g/dL Final    RDW 06/07/2019 13.3  11.0 - 14.5 % Final    Platelets 79/63/8894 257  140 - 450 k/uL Final    MPV 06/07/2019 7.8  6.0 - 12.0 fL Final    NRBC Automated 06/07/2019 NOT <21 mg/L Final    Creatinine, Ur 06/07/2019 131.0  39.0 - 259.0 mg/dL Final    Microalb/Crt. Ratio 06/07/2019 CANNOT BE CALCULATED  <17 mcg/mg creat Final       Personalized Preventive Plan for Buddy Beal - 6/11/2019  Medicare offers a range of preventive health benefits. Some of the tests and screenings are paid in full while other may be subject to a deductible, co-insurance, and/or copay. Some of these benefits include a comprehensive review of your medical history including lifestyle, illnesses that may run in your family, and various assessments and screenings as appropriate. After reviewing your medical record and screening and assessments performed today your provider may have ordered immunizations, labs, imaging, and/or referrals for you. A list of these orders (if applicable) as well as your Preventive Care list are included within your After Visit Summary for your review. Other Preventive Recommendations:    · A preventive eye exam performed by an eye specialist is recommended every 1-2 years to screen for glaucoma; cataracts, macular degeneration, and other eye disorders. · A preventive dental visit is recommended every 6 months. · Try to get at least 150 minutes of exercise per week or 10,000 steps per day on a pedometer . · Order or download the FREE \"Exercise & Physical Activity: Your Everyday Guide\" from [employer]. Call 1-391.932.6645 or search [employer] online. · You need 1027-6846 mg of calcium and 6177-3494 IU of vitamin D per day. It is possible to meet your calcium requirement with diet alone, but a vitamin D supplement is usually necessary to meet this goal.  · When exposed to the sun, use a sunscreen that protects against both UVA and UVB radiation with an SPF of 30 or greater. Reapply every 2 to 3 hours or after sweating, drying off with a towel, or swimming. · Always wear a seat belt when traveling in a car.  Always wear a helmet

## 2019-09-23 RX ORDER — ATORVASTATIN CALCIUM 40 MG/1
TABLET, FILM COATED ORAL
Qty: 90 TABLET | Refills: 1 | Status: SHIPPED | OUTPATIENT
Start: 2019-09-23 | End: 2020-03-16

## 2019-09-23 RX ORDER — GLIMEPIRIDE 4 MG/1
TABLET ORAL
Qty: 90 TABLET | Refills: 1 | Status: SHIPPED | OUTPATIENT
Start: 2019-09-23 | End: 2020-03-16

## 2019-09-23 NOTE — TELEPHONE ENCOUNTER
Vito worthington called requesting a refill of the below medication which has been pended for you:     Requested Prescriptions     Pending Prescriptions Disp Refills    glimepiride (AMARYL) 4 MG tablet [Pharmacy Med Name: Glimepiride Oral Tablet 4 MG] 90 tablet 1     Sig: TAKE 1 TABLET BY MOUTH IN THE MORNING    atorvastatin (LIPITOR) 40 MG tablet [Pharmacy Med Name: Atorvastatin Calcium Oral Tablet 40 MG] 90 tablet 1     Sig: TAKE 1 TABLET BY MOUTH ONE TIME A DAY       Last Appointment Date: 6/11/2019  Next Appointment Date: 12/11/2019    No Known Allergies

## 2019-12-13 ENCOUNTER — HOSPITAL ENCOUNTER (OUTPATIENT)
Dept: LAB | Age: 44
Discharge: HOME | End: 2019-12-13
Payer: MEDICARE

## 2019-12-13 DIAGNOSIS — E11.9 CONTROLLED TYPE 2 DIABETES MELLITUS WITHOUT COMPLICATION, WITHOUT LONG-TERM CURRENT USE OF INSULIN (HCC): ICD-10-CM

## 2019-12-13 DIAGNOSIS — E78.2 MIXED HYPERLIPIDEMIA: ICD-10-CM

## 2019-12-13 LAB
ABSOLUTE EOS #: 0.1 K/UL (ref 0–0.4)
ABSOLUTE IMMATURE GRANULOCYTE: NORMAL K/UL (ref 0–0.3)
ABSOLUTE LYMPH #: 1.6 K/UL (ref 1–4.8)
ABSOLUTE MONO #: 0.6 K/UL (ref 0.1–1.2)
ALBUMIN SERPL-MCNC: 4.8 G/DL (ref 3.5–5.2)
ALBUMIN/GLOBULIN RATIO: 1.4 (ref 1–2.5)
ALP BLD-CCNC: 55 U/L (ref 40–129)
ALT SERPL-CCNC: 21 U/L (ref 5–41)
ANION GAP SERPL CALCULATED.3IONS-SCNC: 14 MMOL/L (ref 9–17)
AST SERPL-CCNC: 20 U/L
BASOPHILS # BLD: 1 % (ref 0–2)
BASOPHILS ABSOLUTE: 0 K/UL (ref 0–0.2)
BILIRUB SERPL-MCNC: 0.64 MG/DL (ref 0.3–1.2)
BUN BLDV-MCNC: 17 MG/DL (ref 6–20)
BUN/CREAT BLD: 22 (ref 9–20)
CALCIUM SERPL-MCNC: 10.3 MG/DL (ref 8.6–10.4)
CHLORIDE BLD-SCNC: 97 MMOL/L (ref 98–107)
CHOLESTEROL/HDL RATIO: 2.5
CHOLESTEROL: 150 MG/DL
CO2: 26 MMOL/L (ref 20–31)
CREAT SERPL-MCNC: 0.78 MG/DL (ref 0.7–1.2)
DIFFERENTIAL TYPE: NORMAL
EOSINOPHILS RELATIVE PERCENT: 1 % (ref 1–8)
ESTIMATED AVERAGE GLUCOSE: 146 MG/DL
GFR AFRICAN AMERICAN: >60 ML/MIN
GFR NON-AFRICAN AMERICAN: >60 ML/MIN
GFR SERPL CREATININE-BSD FRML MDRD: ABNORMAL ML/MIN/{1.73_M2}
GFR SERPL CREATININE-BSD FRML MDRD: ABNORMAL ML/MIN/{1.73_M2}
GLUCOSE BLD-MCNC: 143 MG/DL (ref 70–99)
HBA1C MFR BLD: 6.7 % (ref 4.8–5.9)
HCT VFR BLD CALC: 49.8 % (ref 41–53)
HDLC SERPL-MCNC: 60 MG/DL
HEMOGLOBIN: 16.7 G/DL (ref 13.5–17.5)
IMMATURE GRANULOCYTES: NORMAL %
LDL CHOLESTEROL: 73 MG/DL (ref 0–130)
LYMPHOCYTES # BLD: 17 % (ref 15–43)
MCH RBC QN AUTO: 31 PG (ref 26–34)
MCHC RBC AUTO-ENTMCNC: 33.5 G/DL (ref 31–37)
MCV RBC AUTO: 92.5 FL (ref 80–100)
MONOCYTES # BLD: 7 % (ref 6–14)
NRBC AUTOMATED: NORMAL PER 100 WBC
PDW BLD-RTO: 13.1 % (ref 11–14.5)
PLATELET # BLD: 283 K/UL (ref 140–450)
PLATELET ESTIMATE: NORMAL
PMV BLD AUTO: 9.1 FL (ref 6–12)
POTASSIUM SERPL-SCNC: 3.8 MMOL/L (ref 3.7–5.3)
RBC # BLD: 5.39 M/UL (ref 4.5–5.9)
RBC # BLD: NORMAL 10*6/UL
SEG NEUTROPHILS: 74 % (ref 44–74)
SEGMENTED NEUTROPHILS ABSOLUTE COUNT: 7 K/UL (ref 1.8–7.7)
SODIUM BLD-SCNC: 137 MMOL/L (ref 135–144)
TOTAL PROTEIN: 8.3 G/DL (ref 6.4–8.3)
TRIGL SERPL-MCNC: 83 MG/DL
VLDLC SERPL CALC-MCNC: NORMAL MG/DL (ref 1–30)
WBC # BLD: 9.3 K/UL (ref 3.5–11)
WBC # BLD: NORMAL 10*3/UL

## 2019-12-13 PROCEDURE — 85025 COMPLETE CBC W/AUTO DIFF WBC: CPT

## 2019-12-13 PROCEDURE — 80061 LIPID PANEL: CPT

## 2019-12-13 PROCEDURE — 80053 COMPREHEN METABOLIC PANEL: CPT

## 2019-12-13 PROCEDURE — 36415 COLL VENOUS BLD VENIPUNCTURE: CPT

## 2019-12-13 PROCEDURE — 83036 HEMOGLOBIN GLYCOSYLATED A1C: CPT

## 2019-12-20 ENCOUNTER — OFFICE VISIT (OUTPATIENT)
Dept: FAMILY MEDICINE CLINIC | Age: 44
End: 2019-12-20
Payer: MEDICARE

## 2019-12-20 VITALS
SYSTOLIC BLOOD PRESSURE: 134 MMHG | RESPIRATION RATE: 16 BRPM | BODY MASS INDEX: 31.67 KG/M2 | HEART RATE: 80 BPM | DIASTOLIC BLOOD PRESSURE: 86 MMHG | HEIGHT: 68 IN | WEIGHT: 209 LBS

## 2019-12-20 DIAGNOSIS — F84.0 AUTISM: ICD-10-CM

## 2019-12-20 DIAGNOSIS — E11.9 CONTROLLED TYPE 2 DIABETES MELLITUS WITHOUT COMPLICATION, WITHOUT LONG-TERM CURRENT USE OF INSULIN (HCC): Primary | ICD-10-CM

## 2019-12-20 DIAGNOSIS — E78.2 MIXED HYPERLIPIDEMIA: ICD-10-CM

## 2019-12-20 PROCEDURE — G8427 DOCREV CUR MEDS BY ELIG CLIN: HCPCS | Performed by: FAMILY MEDICINE

## 2019-12-20 PROCEDURE — 3044F HG A1C LEVEL LT 7.0%: CPT | Performed by: FAMILY MEDICINE

## 2019-12-20 PROCEDURE — G8417 CALC BMI ABV UP PARAM F/U: HCPCS | Performed by: FAMILY MEDICINE

## 2019-12-20 PROCEDURE — 1036F TOBACCO NON-USER: CPT | Performed by: FAMILY MEDICINE

## 2019-12-20 PROCEDURE — 99214 OFFICE O/P EST MOD 30 MIN: CPT | Performed by: FAMILY MEDICINE

## 2019-12-20 PROCEDURE — 2022F DILAT RTA XM EVC RTNOPTHY: CPT | Performed by: FAMILY MEDICINE

## 2019-12-20 PROCEDURE — G8484 FLU IMMUNIZE NO ADMIN: HCPCS | Performed by: FAMILY MEDICINE

## 2019-12-20 RX ORDER — METFORMIN HYDROCHLORIDE 500 MG/1
1000 TABLET, EXTENDED RELEASE ORAL NIGHTLY
Qty: 180 TABLET | Refills: 0 | OUTPATIENT
Start: 2019-12-20

## 2019-12-20 RX ORDER — METFORMIN HYDROCHLORIDE 500 MG/1
1000 TABLET, EXTENDED RELEASE ORAL NIGHTLY
Qty: 180 TABLET | Refills: 1 | Status: SHIPPED | OUTPATIENT
Start: 2019-12-20 | End: 2020-05-18 | Stop reason: SDUPTHER

## 2019-12-20 ASSESSMENT — PATIENT HEALTH QUESTIONNAIRE - PHQ9
SUM OF ALL RESPONSES TO PHQ9 QUESTIONS 1 & 2: 0
2. FEELING DOWN, DEPRESSED OR HOPELESS: 0
SUM OF ALL RESPONSES TO PHQ QUESTIONS 1-9: 0
1. LITTLE INTEREST OR PLEASURE IN DOING THINGS: 0
SUM OF ALL RESPONSES TO PHQ QUESTIONS 1-9: 0

## 2019-12-20 ASSESSMENT — ENCOUNTER SYMPTOMS
ABDOMINAL PAIN: 0
VOMITING: 0
NAUSEA: 0
SHORTNESS OF BREATH: 0
DIARRHEA: 0
EYE DISCHARGE: 0
CONSTIPATION: 0
EYE REDNESS: 0
TROUBLE SWALLOWING: 0
COUGH: 0
WHEEZING: 0
SINUS PRESSURE: 0
SORE THROAT: 0
RHINORRHEA: 0

## 2020-03-16 RX ORDER — ATORVASTATIN CALCIUM 40 MG/1
TABLET, FILM COATED ORAL
Qty: 90 TABLET | Refills: 1 | Status: SHIPPED | OUTPATIENT
Start: 2020-03-16 | End: 2020-05-18 | Stop reason: SDUPTHER

## 2020-03-16 RX ORDER — GLIMEPIRIDE 4 MG/1
TABLET ORAL
Qty: 90 TABLET | Refills: 1 | Status: SHIPPED | OUTPATIENT
Start: 2020-03-16 | End: 2020-05-18 | Stop reason: SDUPTHER

## 2020-03-16 NOTE — TELEPHONE ENCOUNTER
Cynthia Giang called requesting a refill of the below medication which has been pended for you:     Requested Prescriptions     Pending Prescriptions Disp Refills    atorvastatin (LIPITOR) 40 MG tablet [Pharmacy Med Name: Atorvastatin Calcium Oral Tablet 40 MG] 90 tablet 1     Sig: TAKE 1 TABLET BY MOUTH ONE TIME A DAY    glimepiride (AMARYL) 4 MG tablet [Pharmacy Med Name: Glimepiride Oral Tablet 4 MG] 90 tablet 1     Sig: TAKE 1 TABLET BY MOUTH IN THE MORNING       Last Appointment Date: 12/20/2019  Next Appointment Date: 6/19/2020    No Known Allergies

## 2020-04-28 ENCOUNTER — HOSPITAL ENCOUNTER (OUTPATIENT)
Age: 45
Setting detail: SPECIMEN
Discharge: HOME | End: 2020-04-28
Payer: MEDICARE

## 2020-04-28 ENCOUNTER — TELEPHONE (OUTPATIENT)
Dept: FAMILY MEDICINE CLINIC | Age: 45
End: 2020-04-28

## 2020-04-28 PROCEDURE — U0002 COVID-19 LAB TEST NON-CDC: HCPCS

## 2020-04-28 NOTE — TELEPHONE ENCOUNTER
Patients sister that is his caregiver was found  at home and they are having issues with placement until they can confirm the patient does not have COVID. Patient is not displaying symptoms but they are unsure what happened to sister. They were wondering if the patient could be hospitalized until COVID testing comes back that the health dept is going to do.

## 2020-04-29 NOTE — TELEPHONE ENCOUNTER
Got consent from rashad Carvalho to do [facility] and patient was transported to the office by the Board of  to have testing done.   This was completed on 4/28/20

## 2020-04-30 ENCOUNTER — TELEPHONE (OUTPATIENT)
Dept: FAMILY MEDICINE CLINIC | Age: 45
End: 2020-04-30

## 2020-04-30 NOTE — TELEPHONE ENCOUNTER
They need patients summary,future appts, if patient needs blood sugar checked, how often,paremeter, and who to call if patient is outside of paremeter if checking blood sugars email to [facility] [email or web address]    Do you want blood sugar checks?

## 2020-05-01 ENCOUNTER — TELEPHONE (OUTPATIENT)
Dept: FAMILY MEDICINE CLINIC | Age: 45
End: 2020-05-01

## 2020-05-01 LAB — SARS-COV-2, NAA: NOT DETECTED

## 2020-05-01 NOTE — TELEPHONE ENCOUNTER
Called Meme Auguste back and informed her we do not know anything about a PCN allergy. She stated at one time sister wondered if he had an allergy but was not sure. She states Marilu from [address] [employer] [employer] is going to stop by and  packet of information on patient such as future appts, lab orders, and patients summary. This information was left at the  to .

## 2020-05-13 ENCOUNTER — TELEPHONE (OUTPATIENT)
Dept: FAMILY MEDICINE CLINIC | Age: 45
End: 2020-05-13

## 2020-05-13 RX ORDER — RISPERIDONE 0.5 MG/1
0.5 TABLET, FILM COATED ORAL DAILY
Qty: 30 TABLET | Refills: 2 | Status: SHIPPED | OUTPATIENT
Start: 2020-05-13 | End: 2020-06-03 | Stop reason: SDUPTHER

## 2020-05-14 ENCOUNTER — HOSPITAL ENCOUNTER (OUTPATIENT)
Dept: LAB | Age: 45
Discharge: HOME | End: 2020-05-14
Payer: MEDICARE

## 2020-05-14 LAB
ABSOLUTE EOS #: 0.09 K/UL (ref 0–0.44)
ABSOLUTE IMMATURE GRANULOCYTE: <0.03 K/UL (ref 0–0.3)
ABSOLUTE LYMPH #: 1.26 K/UL (ref 1.1–3.7)
ABSOLUTE MONO #: 0.46 K/UL (ref 0.1–1.2)
ALBUMIN SERPL-MCNC: 4.5 G/DL (ref 3.5–5.2)
ALBUMIN/GLOBULIN RATIO: 1.6 (ref 1–2.5)
ALP BLD-CCNC: 43 U/L (ref 40–129)
ALT SERPL-CCNC: 19 U/L (ref 5–41)
ANION GAP SERPL CALCULATED.3IONS-SCNC: 13 MMOL/L (ref 9–17)
AST SERPL-CCNC: 18 U/L
BASOPHILS # BLD: 1 % (ref 0–2)
BASOPHILS ABSOLUTE: 0.05 K/UL (ref 0–0.2)
BILIRUB SERPL-MCNC: 0.36 MG/DL (ref 0.3–1.2)
BUN BLDV-MCNC: 14 MG/DL (ref 6–20)
BUN/CREAT BLD: 18 (ref 9–20)
CALCIUM SERPL-MCNC: 9.7 MG/DL (ref 8.6–10.4)
CHLORIDE BLD-SCNC: 101 MMOL/L (ref 98–107)
CHOLESTEROL/HDL RATIO: 1.8
CHOLESTEROL: 103 MG/DL
CO2: 26 MMOL/L (ref 20–31)
CREAT SERPL-MCNC: 0.79 MG/DL (ref 0.7–1.2)
DIFFERENTIAL TYPE: ABNORMAL
EOSINOPHILS RELATIVE PERCENT: 2 % (ref 1–4)
ESTIMATED AVERAGE GLUCOSE: 154 MG/DL
GFR AFRICAN AMERICAN: >60 ML/MIN
GFR NON-AFRICAN AMERICAN: >60 ML/MIN
GFR SERPL CREATININE-BSD FRML MDRD: ABNORMAL ML/MIN/{1.73_M2}
GFR SERPL CREATININE-BSD FRML MDRD: ABNORMAL ML/MIN/{1.73_M2}
GLUCOSE BLD-MCNC: 229 MG/DL (ref 70–99)
HBA1C MFR BLD: 7 % (ref 4.8–5.9)
HCT VFR BLD CALC: 46.9 % (ref 40.7–50.3)
HDLC SERPL-MCNC: 56 MG/DL
HEMOGLOBIN: 15.4 G/DL (ref 13–17)
IMMATURE GRANULOCYTES: 0 %
LDL CHOLESTEROL: 30 MG/DL (ref 0–130)
LYMPHOCYTES # BLD: 21 % (ref 24–43)
MCH RBC QN AUTO: 30.4 PG (ref 25.2–33.5)
MCHC RBC AUTO-ENTMCNC: 32.8 G/DL (ref 25.2–33.5)
MCV RBC AUTO: 92.5 FL (ref 82.6–102.9)
MONOCYTES # BLD: 8 % (ref 3–12)
NRBC AUTOMATED: 0 PER 100 WBC
PDW BLD-RTO: 12.6 % (ref 11.8–14.4)
PLATELET # BLD: 236 K/UL (ref 138–453)
PLATELET ESTIMATE: ABNORMAL
PMV BLD AUTO: 10.3 FL (ref 8.1–13.5)
POTASSIUM SERPL-SCNC: 4 MMOL/L (ref 3.7–5.3)
RBC # BLD: 5.07 M/UL (ref 4.21–5.77)
RBC # BLD: ABNORMAL 10*6/UL
SEG NEUTROPHILS: 68 % (ref 36–65)
SEGMENTED NEUTROPHILS ABSOLUTE COUNT: 4.27 K/UL (ref 1.5–8.1)
SODIUM BLD-SCNC: 140 MMOL/L (ref 135–144)
TOTAL PROTEIN: 7.4 G/DL (ref 6.4–8.3)
TRIGL SERPL-MCNC: 84 MG/DL
VLDLC SERPL CALC-MCNC: NORMAL MG/DL (ref 1–30)
WBC # BLD: 6.2 K/UL (ref 3.5–11.3)
WBC # BLD: ABNORMAL 10*3/UL

## 2020-05-14 PROCEDURE — 80053 COMPREHEN METABOLIC PANEL: CPT

## 2020-05-14 PROCEDURE — 83036 HEMOGLOBIN GLYCOSYLATED A1C: CPT

## 2020-05-14 PROCEDURE — 80061 LIPID PANEL: CPT

## 2020-05-14 PROCEDURE — 85025 COMPLETE CBC W/AUTO DIFF WBC: CPT

## 2020-05-14 PROCEDURE — 36415 COLL VENOUS BLD VENIPUNCTURE: CPT

## 2020-05-15 ENCOUNTER — HOSPITAL ENCOUNTER (OUTPATIENT)
Age: 45
Setting detail: SPECIMEN
Discharge: HOME | End: 2020-05-15
Payer: MEDICARE

## 2020-05-15 LAB
CREATININE URINE: 185.1 MG/DL (ref 39–259)
MICROALBUMIN/CREAT 24H UR: <12 MG/L
MICROALBUMIN/CREAT UR-RTO: NORMAL MCG/MG CREAT

## 2020-05-15 PROCEDURE — 82570 ASSAY OF URINE CREATININE: CPT

## 2020-05-15 PROCEDURE — 82043 UR ALBUMIN QUANTITATIVE: CPT

## 2020-05-18 ENCOUNTER — OFFICE VISIT (OUTPATIENT)
Dept: FAMILY MEDICINE CLINIC | Age: 45
End: 2020-05-18
Payer: MEDICARE

## 2020-05-18 VITALS
SYSTOLIC BLOOD PRESSURE: 126 MMHG | BODY MASS INDEX: 30.62 KG/M2 | HEART RATE: 80 BPM | DIASTOLIC BLOOD PRESSURE: 80 MMHG | HEIGHT: 68 IN | WEIGHT: 202 LBS

## 2020-05-18 PROCEDURE — 99213 OFFICE O/P EST LOW 20 MIN: CPT

## 2020-05-18 PROCEDURE — 99214 OFFICE O/P EST MOD 30 MIN: CPT | Performed by: FAMILY MEDICINE

## 2020-05-18 PROCEDURE — 1036F TOBACCO NON-USER: CPT | Performed by: FAMILY MEDICINE

## 2020-05-18 PROCEDURE — 90732 PPSV23 VACC 2 YRS+ SUBQ/IM: CPT

## 2020-05-18 PROCEDURE — 3051F HG A1C>EQUAL 7.0%<8.0%: CPT | Performed by: FAMILY MEDICINE

## 2020-05-18 PROCEDURE — G8427 DOCREV CUR MEDS BY ELIG CLIN: HCPCS | Performed by: FAMILY MEDICINE

## 2020-05-18 PROCEDURE — G8417 CALC BMI ABV UP PARAM F/U: HCPCS | Performed by: FAMILY MEDICINE

## 2020-05-18 PROCEDURE — 90715 TDAP VACCINE 7 YRS/> IM: CPT

## 2020-05-18 PROCEDURE — 2022F DILAT RTA XM EVC RTNOPTHY: CPT | Performed by: FAMILY MEDICINE

## 2020-05-18 RX ORDER — ATORVASTATIN CALCIUM 40 MG/1
TABLET, FILM COATED ORAL
Qty: 90 TABLET | Refills: 1 | Status: SHIPPED | OUTPATIENT
Start: 2020-05-18 | End: 2020-10-28

## 2020-05-18 RX ORDER — GLIMEPIRIDE 4 MG/1
TABLET ORAL
Qty: 90 TABLET | Refills: 1 | Status: SHIPPED | OUTPATIENT
Start: 2020-05-18 | End: 2020-10-28

## 2020-05-18 RX ORDER — METFORMIN HYDROCHLORIDE 500 MG/1
1000 TABLET, EXTENDED RELEASE ORAL NIGHTLY
Qty: 180 TABLET | Refills: 1 | Status: SHIPPED | OUTPATIENT
Start: 2020-05-18 | End: 2020-06-15

## 2020-05-18 ASSESSMENT — PATIENT HEALTH QUESTIONNAIRE - PHQ9
1. LITTLE INTEREST OR PLEASURE IN DOING THINGS: 0
SUM OF ALL RESPONSES TO PHQ9 QUESTIONS 1 & 2: 0
2. FEELING DOWN, DEPRESSED OR HOPELESS: 0
SUM OF ALL RESPONSES TO PHQ QUESTIONS 1-9: 0
SUM OF ALL RESPONSES TO PHQ QUESTIONS 1-9: 0

## 2020-05-18 ASSESSMENT — ENCOUNTER SYMPTOMS
VOMITING: 0
SINUS PRESSURE: 0
SHORTNESS OF BREATH: 0
DIARRHEA: 0
EYE REDNESS: 0
ABDOMINAL PAIN: 0
WHEEZING: 0
CONSTIPATION: 0
RHINORRHEA: 0
NAUSEA: 0
SORE THROAT: 0
COUGH: 0
EYE DISCHARGE: 0
TROUBLE SWALLOWING: 0

## 2020-05-18 NOTE — PROGRESS NOTES
as well. Encouraged continued work with him regarding dietary changes and I feel that they are doing a good job managing this. Patient otherwise has no other acute medical concerns at this time. .  Patient's recent lab reports are as follows:    Results for orders placed or performed during the hospital encounter of 05/15/20   Microalbumin, Ur   Result Value Ref Range    Microalb, Ur <12 <21 mg/L    Creatinine, Ur 185.1 39.0 - 259.0 mg/dL    Microalb/Crt. Ratio CANNOT BE CALCULATED <17 mcg/mg creat      Lab Results   Component Value Date    WBC 6.2 05/14/2020    RBC 5.07 05/14/2020    HGB 15.4 05/14/2020    HCT 46.9 05/14/2020    MCV 92.5 05/14/2020    MCH 30.4 05/14/2020    MCHC 32.8 05/14/2020    RDW 12.6 05/14/2020     05/14/2020    MPV 10.3 05/14/2020       Lab Results   Component Value Date    CHOL 103 05/14/2020    HDL 56 05/14/2020    CHOLHDLRATIO 1.8 05/14/2020    TRIG 84 05/14/2020    VLDL NOT REPORTED 05/14/2020       Lab Results   Component Value Date    TSH 1.58 07/14/2017       Lab Results   Component Value Date     05/14/2020    K 4.0 05/14/2020     05/14/2020    CO2 26 05/14/2020    BUN 14 05/14/2020    CREATININE 0.79 05/14/2020    GLUCOSE 229 05/14/2020    CALCIUM 9.7 05/14/2020       Lab Results   Component Value Date    LABA1C 7.0 05/14/2020         Other review of systems are as noted below. Preventative measures are reviewed today. See health maintenance section for complete preventative plan of care. Did review patient's med list, allergies, social history, fam history, pmhx and pshx today as noted in the record. Review of Systems   Constitutional: Negative for chills, fatigue and fever. HENT: Negative for congestion, ear pain, postnasal drip, rhinorrhea, sinus pressure, sore throat and trouble swallowing. Eyes: Negative for discharge and redness. Respiratory: Negative for cough, shortness of breath and wheezing. Cardiovascular: Negative for chest pain. Gastrointestinal: Negative for abdominal pain, constipation, diarrhea, nausea and vomiting. Genitourinary: Negative for dysuria, flank pain, frequency and urgency. Musculoskeletal: Negative for arthralgias, myalgias and neck pain. Skin: Negative for rash and wound. Allergic/Immunologic: Negative for environmental allergies. Neurological: Negative for dizziness, weakness, light-headedness and headaches. Hematological: Negative for adenopathy. Psychiatric/Behavioral: Negative. Prior to Visit Medications    Medication Sig Taking? Authorizing Provider   risperiDONE (RISPERDAL) 0.5 MG tablet Take 1 tablet by mouth daily  Rachele Gutierrez DO   atorvastatin (LIPITOR) 40 MG tablet TAKE 1 TABLET BY MOUTH ONE TIME A DAY   Rachele Diaz DO   glimepiride (AMARYL) 4 MG tablet TAKE 1 TABLET BY MOUTH IN THE MORNING   Lauretta Holter, DO   metFORMIN (GLUCOPHAGE-XR) 500 MG extended release tablet Take 2 tablets by mouth nightly  Lauretta Holter, DO        Social History     Tobacco Use    Smoking status: Never Smoker    Smokeless tobacco: Never Used   Substance Use Topics    Alcohol use: No        There were no vitals filed for this visit. Estimated body mass index is 31.78 kg/m² as calculated from the following:    Height as of 12/20/19: 5' 8\" (1.727 m). Weight as of 12/20/19: 209 lb (94.8 kg). Physical Exam  Vitals signs and nursing note reviewed. Constitutional:       General: He is not in acute distress. Appearance: Normal appearance. He is well-developed. He is not diaphoretic. HENT:      Head: Normocephalic and atraumatic. Right Ear: Tympanic membrane, ear canal and external ear normal.      Left Ear: Tympanic membrane, ear canal and external ear normal.      Nose: Nose normal.      Mouth/Throat:      Mouth: Mucous membranes are moist.      Pharynx: Oropharynx is clear. No oropharyngeal exudate. Eyes:      General:         Right eye: No discharge.          Left eye: No Standing Expiration Date:   5/18/2021     Order Specific Question:   Is Patient Fasting?/# of Hours     Answer:   12 hours    TSH without Reflex     Standing Status:   Future     Standing Expiration Date:   5/18/2021    External Referral To Psychiatry     Referral Priority:   Routine     Referral Type:   Eval and Treat     Referral Reason:   Specialty Services Required     Referred to Provider: Fox Nickerson MD     Requested Specialty:   Psychiatry     Number of Visits Requested:   1     Will place referral for the psychiatrist for further med management. At this point the Risperdal has helped tone down some of the aggressive behaviors but I do feel psychiatry should be involved in his care. Patient is to continue to work on dietary efforts. Caregivers do seem to be helping with cutting down on his carbohydrate and sugar consumption. Patient is to continue on the rest of his current medical therapy. No additional changes are made at this time. Patient is to return to my office in 6 months duration or sooner if any further problems or symptoms arise. (Please note that portions of this note were completed with a voice recognition program. Efforts were made to edit the dictations but occasionally words are mis-transcribed.)      No follow-ups on file. An electronic signature was used to authenticate this note.     --Lauretta Holter, DO on 5/18/2020 at 7:57 AM

## 2020-06-03 ENCOUNTER — TELEPHONE (OUTPATIENT)
Dept: FAMILY MEDICINE CLINIC | Age: 45
End: 2020-06-03

## 2020-06-03 RX ORDER — RISPERIDONE 1 MG/1
1 TABLET, FILM COATED ORAL DAILY
Qty: 30 TABLET | Refills: 2 | Status: SHIPPED | OUTPATIENT
Start: 2020-06-03 | End: 2020-06-03 | Stop reason: SDUPTHER

## 2020-06-03 RX ORDER — RISPERIDONE 1 MG/1
1 TABLET, FILM COATED ORAL DAILY
Qty: 30 TABLET | Refills: 2 | Status: SHIPPED | OUTPATIENT
Start: 2020-06-03 | End: 2020-07-08

## 2020-06-15 RX ORDER — METFORMIN HYDROCHLORIDE 500 MG/1
1000 TABLET, EXTENDED RELEASE ORAL NIGHTLY
Qty: 180 TABLET | Refills: 1 | Status: SHIPPED | OUTPATIENT
Start: 2020-06-15 | End: 2020-10-28

## 2020-06-15 NOTE — TELEPHONE ENCOUNTER
Hakeem Santos called requesting a refill of the below medication which has been pended for you:     Requested Prescriptions     Pending Prescriptions Disp Refills    metFORMIN (GLUCOPHAGE-XR) 500 MG extended release tablet [Pharmacy Med Name: metFORMIN HCl ER Oral Tablet Extended Release 24 Hour 500 MG] 180 tablet 0     Sig: TAKE 2 TABLETS BY MOUTH nightly       Last Appointment Date: 5/18/2020  Next Appointment Date: 11/20/2020    No Known Allergies

## 2020-06-29 RX ORDER — BLOOD-GLUCOSE METER
KIT MISCELLANEOUS
Qty: 100 STRIP | Refills: 1 | Status: SHIPPED | OUTPATIENT
Start: 2020-06-29 | End: 2020-09-15

## 2020-07-08 RX ORDER — RISPERIDONE 1 MG/1
1 TABLET, FILM COATED ORAL DAILY
Qty: 30 TABLET | Refills: 2 | Status: SHIPPED | OUTPATIENT
Start: 2020-07-08 | End: 2020-10-28

## 2020-07-08 NOTE — TELEPHONE ENCOUNTER
Home health calling, states they did not  Resperidone for pt to take this morning. Wants to know if they can administer this now, as they just picked it up. If so, can they send an order for a one time \"off time\" admin for today please advise, thank you!

## 2020-07-08 NOTE — TELEPHONE ENCOUNTER
Called Marilu and let her know by voicemail to just hold todays dose and resume as scheduled tomorrow.

## 2020-09-15 RX ORDER — BLOOD-GLUCOSE METER
KIT MISCELLANEOUS
Qty: 100 STRIP | Refills: 1 | Status: SHIPPED | OUTPATIENT
Start: 2020-09-15

## 2020-09-15 NOTE — TELEPHONE ENCOUNTER
Kalpesh Camejo called requesting a refill of the below medication which has been pended for you:     Requested Prescriptions     Pending Prescriptions Disp Refills    GLUCOCARD VITAL TEST strip [Pharmacy Med Name: GLUCOCARD VITAL TEST [facility]] 100 strip 1     Sig: TEST ONCE DAILY       Last Appointment Date: 5/18/2020  Next Appointment Date: 11/20/2020    No Known Allergies

## 2020-09-21 RX ORDER — BLOOD-GLUCOSE METER
KIT MISCELLANEOUS
Qty: 100 STRIP | Refills: 1 | OUTPATIENT
Start: 2020-09-21

## 2020-09-21 NOTE — TELEPHONE ENCOUNTER
Eron Elizabeth called requesting a refill of the below medication which has been pended for you: declined as script sent 9/15/2020    Requested Prescriptions     Refused Prescriptions Disp Refills    GLUCOCARD VITAL TEST strip [Pharmacy Med Name: Nicki Delay TEST STRP STRP] 100 strip 1     Sig: TEST ONCE DAILY       Last Appointment Date: 5/18/2020  Next Appointment Date: 11/20/2020    No Known Allergies

## 2020-10-28 RX ORDER — GLIMEPIRIDE 4 MG/1
TABLET ORAL
Qty: 30 TABLET | Refills: 0 | Status: SHIPPED | OUTPATIENT
Start: 2020-10-28 | End: 2020-11-20 | Stop reason: SDUPTHER

## 2020-10-28 RX ORDER — RISPERIDONE 1 MG/1
1 TABLET, FILM COATED ORAL DAILY
Qty: 30 TABLET | Refills: 0 | Status: SHIPPED | OUTPATIENT
Start: 2020-10-28 | End: 2020-11-20 | Stop reason: SDUPTHER

## 2020-10-28 RX ORDER — ATORVASTATIN CALCIUM 40 MG/1
TABLET, FILM COATED ORAL
Qty: 30 TABLET | Refills: 0 | Status: SHIPPED | OUTPATIENT
Start: 2020-10-28 | End: 2020-11-20 | Stop reason: SDUPTHER

## 2020-10-28 RX ORDER — METFORMIN HYDROCHLORIDE 500 MG/1
TABLET, EXTENDED RELEASE ORAL
Qty: 60 TABLET | Refills: 0 | Status: SHIPPED | OUTPATIENT
Start: 2020-10-28 | End: 2020-11-20 | Stop reason: SDUPTHER

## 2020-10-28 NOTE — TELEPHONE ENCOUNTER
Mayra Bledsoe called requesting a refill of the below medication which has been pended for you:     Requested Prescriptions     Pending Prescriptions Disp Refills    risperiDONE (RISPERDAL) 1 MG tablet [Pharmacy Med Name: RISPERIDONE 1 MG TABS 1 Tablet] 30 tablet 2     Sig: TAKE 1 TABLET BY MOUTH DAILY    glimepiride (AMARYL) 4 MG tablet [Pharmacy Med Name: GLIMEPIRIDE 4 MG TAB 4 Tablet] 30 tablet 1     Sig: TAKE 1 TABLET BY MOUTH EVERY MORNING    atorvastatin (LIPITOR) 40 MG tablet [Pharmacy Med Name: ATORVASTATIN 40 MG TABLET 40 Tablet] 30 tablet 1     Sig: TAKE 1 TABLET BY MOUTH DAILY    metFORMIN (GLUCOPHAGE-XR) 500 MG extended release tablet [Pharmacy Med Name: METFORMIN HCL  MG  Tablet] 60 tablet 1     Sig: TAKE 2 TABLETS BY MOUTH EVERY NIGHT       Last Appointment Date: 5/18/2020  Next Appointment Date: 11/20/2020    No Known Allergies

## 2020-11-16 ENCOUNTER — HOSPITAL ENCOUNTER (OUTPATIENT)
Dept: LAB | Age: 45
Discharge: HOME | End: 2020-11-16
Payer: MEDICARE

## 2020-11-16 LAB
ABSOLUTE EOS #: 0.08 K/UL (ref 0–0.44)
ABSOLUTE IMMATURE GRANULOCYTE: <0.03 K/UL (ref 0–0.3)
ABSOLUTE LYMPH #: 1.48 K/UL (ref 1.1–3.7)
ABSOLUTE MONO #: 0.34 K/UL (ref 0.1–1.2)
ALBUMIN SERPL-MCNC: 4.5 G/DL (ref 3.5–5.2)
ALBUMIN/GLOBULIN RATIO: 1.5 (ref 1–2.5)
ALP BLD-CCNC: 45 U/L (ref 40–129)
ALT SERPL-CCNC: 10 U/L (ref 5–41)
ANION GAP SERPL CALCULATED.3IONS-SCNC: 11 MMOL/L (ref 9–17)
AST SERPL-CCNC: 14 U/L
BASOPHILS # BLD: 1 % (ref 0–2)
BASOPHILS ABSOLUTE: 0.06 K/UL (ref 0–0.2)
BILIRUB SERPL-MCNC: 0.51 MG/DL (ref 0.3–1.2)
BUN BLDV-MCNC: 13 MG/DL (ref 6–20)
BUN/CREAT BLD: 18 (ref 9–20)
CALCIUM SERPL-MCNC: 10.3 MG/DL (ref 8.6–10.4)
CHLORIDE BLD-SCNC: 101 MMOL/L (ref 98–107)
CHOLESTEROL/HDL RATIO: 2
CHOLESTEROL: 123 MG/DL
CO2: 26 MMOL/L (ref 20–31)
CREAT SERPL-MCNC: 0.74 MG/DL (ref 0.7–1.2)
DIFFERENTIAL TYPE: ABNORMAL
EOSINOPHILS RELATIVE PERCENT: 1 % (ref 1–4)
ESTIMATED AVERAGE GLUCOSE: 108 MG/DL
GFR AFRICAN AMERICAN: >60 ML/MIN
GFR NON-AFRICAN AMERICAN: >60 ML/MIN
GFR SERPL CREATININE-BSD FRML MDRD: ABNORMAL ML/MIN/{1.73_M2}
GFR SERPL CREATININE-BSD FRML MDRD: ABNORMAL ML/MIN/{1.73_M2}
GLUCOSE BLD-MCNC: 103 MG/DL (ref 70–99)
HBA1C MFR BLD: 5.4 % (ref 4.8–5.9)
HCT VFR BLD CALC: 44.8 % (ref 40.7–50.3)
HDLC SERPL-MCNC: 62 MG/DL
HEMOGLOBIN: 14.5 G/DL (ref 13–17)
IMMATURE GRANULOCYTES: 0 %
LDL CHOLESTEROL: 46 MG/DL (ref 0–130)
LYMPHOCYTES # BLD: 26 % (ref 24–43)
MCH RBC QN AUTO: 30.1 PG (ref 25.2–33.5)
MCHC RBC AUTO-ENTMCNC: 32.4 G/DL (ref 25.2–33.5)
MCV RBC AUTO: 92.9 FL (ref 82.6–102.9)
MONOCYTES # BLD: 6 % (ref 3–12)
NRBC AUTOMATED: 0 PER 100 WBC
PDW BLD-RTO: 12.7 % (ref 11.8–14.4)
PLATELET # BLD: 218 K/UL (ref 138–453)
PLATELET ESTIMATE: ABNORMAL
PMV BLD AUTO: 10.1 FL (ref 8.1–13.5)
POTASSIUM SERPL-SCNC: 3.9 MMOL/L (ref 3.7–5.3)
RBC # BLD: 4.82 M/UL (ref 4.21–5.77)
RBC # BLD: ABNORMAL 10*6/UL
SEG NEUTROPHILS: 66 % (ref 36–65)
SEGMENTED NEUTROPHILS ABSOLUTE COUNT: 3.79 K/UL (ref 1.5–8.1)
SODIUM BLD-SCNC: 138 MMOL/L (ref 135–144)
TOTAL PROTEIN: 7.5 G/DL (ref 6.4–8.3)
TRIGL SERPL-MCNC: 74 MG/DL
TSH SERPL DL<=0.05 MIU/L-ACNC: 0.92 MIU/L (ref 0.3–5)
VLDLC SERPL CALC-MCNC: NORMAL MG/DL (ref 1–30)
WBC # BLD: 5.8 K/UL (ref 3.5–11.3)
WBC # BLD: ABNORMAL 10*3/UL

## 2020-11-16 PROCEDURE — 83036 HEMOGLOBIN GLYCOSYLATED A1C: CPT

## 2020-11-16 PROCEDURE — 85025 COMPLETE CBC W/AUTO DIFF WBC: CPT

## 2020-11-16 PROCEDURE — 80061 LIPID PANEL: CPT

## 2020-11-16 PROCEDURE — 36415 COLL VENOUS BLD VENIPUNCTURE: CPT

## 2020-11-16 PROCEDURE — 80053 COMPREHEN METABOLIC PANEL: CPT

## 2020-11-16 PROCEDURE — 84443 ASSAY THYROID STIM HORMONE: CPT

## 2020-11-20 ENCOUNTER — OFFICE VISIT (OUTPATIENT)
Dept: FAMILY MEDICINE CLINIC | Age: 45
End: 2020-11-20
Payer: MEDICARE

## 2020-11-20 VITALS
SYSTOLIC BLOOD PRESSURE: 120 MMHG | RESPIRATION RATE: 18 BRPM | BODY MASS INDEX: 23.64 KG/M2 | OXYGEN SATURATION: 98 % | WEIGHT: 156 LBS | HEIGHT: 68 IN | HEART RATE: 120 BPM | DIASTOLIC BLOOD PRESSURE: 80 MMHG

## 2020-11-20 PROCEDURE — 3044F HG A1C LEVEL LT 7.0%: CPT | Performed by: FAMILY MEDICINE

## 2020-11-20 PROCEDURE — G8427 DOCREV CUR MEDS BY ELIG CLIN: HCPCS | Performed by: FAMILY MEDICINE

## 2020-11-20 PROCEDURE — G8420 CALC BMI NORM PARAMETERS: HCPCS | Performed by: FAMILY MEDICINE

## 2020-11-20 PROCEDURE — 2022F DILAT RTA XM EVC RTNOPTHY: CPT | Performed by: FAMILY MEDICINE

## 2020-11-20 PROCEDURE — G8484 FLU IMMUNIZE NO ADMIN: HCPCS | Performed by: FAMILY MEDICINE

## 2020-11-20 PROCEDURE — 99212 OFFICE O/P EST SF 10 MIN: CPT | Performed by: FAMILY MEDICINE

## 2020-11-20 PROCEDURE — 1036F TOBACCO NON-USER: CPT | Performed by: FAMILY MEDICINE

## 2020-11-20 PROCEDURE — G0439 PPPS, SUBSEQ VISIT: HCPCS | Performed by: FAMILY MEDICINE

## 2020-11-20 PROCEDURE — 99213 OFFICE O/P EST LOW 20 MIN: CPT | Performed by: FAMILY MEDICINE

## 2020-11-20 RX ORDER — ATORVASTATIN CALCIUM 40 MG/1
TABLET, FILM COATED ORAL
Qty: 30 TABLET | Refills: 5 | Status: SHIPPED | OUTPATIENT
Start: 2020-11-20 | End: 2021-04-14

## 2020-11-20 RX ORDER — GLIMEPIRIDE 2 MG/1
TABLET ORAL
Qty: 30 TABLET | Refills: 5 | Status: SHIPPED | OUTPATIENT
Start: 2020-11-20 | End: 2021-04-14

## 2020-11-20 RX ORDER — RISPERIDONE 1 MG/1
1 TABLET, FILM COATED ORAL DAILY
Qty: 30 TABLET | Refills: 5 | Status: SHIPPED | OUTPATIENT
Start: 2020-11-20 | End: 2021-04-14

## 2020-11-20 RX ORDER — METFORMIN HYDROCHLORIDE 500 MG/1
TABLET, EXTENDED RELEASE ORAL
Qty: 60 TABLET | Refills: 5 | Status: SHIPPED | OUTPATIENT
Start: 2020-11-20 | End: 2021-04-14

## 2020-11-20 ASSESSMENT — ENCOUNTER SYMPTOMS
RHINORRHEA: 0
DIARRHEA: 0
TROUBLE SWALLOWING: 0
WHEEZING: 0
NAUSEA: 0
SHORTNESS OF BREATH: 0
SORE THROAT: 0
EYE REDNESS: 0
EYE DISCHARGE: 0
VOMITING: 0
CONSTIPATION: 0
ABDOMINAL PAIN: 0
SINUS PRESSURE: 0
COUGH: 0

## 2020-11-20 ASSESSMENT — PATIENT HEALTH QUESTIONNAIRE - PHQ9
SUM OF ALL RESPONSES TO PHQ9 QUESTIONS 1 & 2: 0
2. FEELING DOWN, DEPRESSED OR HOPELESS: 0
SUM OF ALL RESPONSES TO PHQ QUESTIONS 1-9: 0
SUM OF ALL RESPONSES TO PHQ QUESTIONS 1-9: 0
1. LITTLE INTEREST OR PLEASURE IN DOING THINGS: 0
SUM OF ALL RESPONSES TO PHQ QUESTIONS 1-9: 0

## 2020-11-20 ASSESSMENT — LIFESTYLE VARIABLES: HOW OFTEN DO YOU HAVE A DRINK CONTAINING ALCOHOL: 0

## 2020-11-20 NOTE — PROGRESS NOTES
2020     Eitan Dye (:  1975) is a 50 y.o. male, here for evaluation of the following medical concerns:    HPI  Patient comes in today for follow up of chronic health issues Patient comes in today with his caregiver for follow-up of his chronic health conditions. Overall he is doing very well. Is now in a group home living situation and is improving his overall cognition and activities of daily living. He is now able to verbalize many words and able to help care for himself better. Is able to dress himself and assist with grooming himself. They do help with meals and is eating much better. As a result he has lost quite a bit of weight and his diabetes mellitus type 2 is optimally controlled. Will scale back on his glimepiride as his hemoglobin A1c is 5.4. Would be concerned that he continue to go down would see possible hypoglycemia. They do try to eat a very healthy diet in the group home setting. He has adjusted well to his environment. Is very active due to his autism and caregiver notes that he goes up and down stairs all day long. He seems to be doing well with his current dose of Risperdal however as far as helping with behavioral issues. He has a known history of hyperlipidemia and his cholesterol levels are stable and controlled with his current statin medication. Patient otherwise has no other acute medical concerns. .  Patient's recent lab reports are as follows:    Results for orders placed or performed during the hospital encounter of 20   TSH without Reflex   Result Value Ref Range    TSH 0.92 0.30 - 5.00 mIU/L   Lipid Panel   Result Value Ref Range    Cholesterol 123 <200 mg/dL    HDL 62 >40 mg/dL    LDL Cholesterol 46 0 - 130 mg/dL    Chol/HDL Ratio 2.0 <5    Triglycerides 74 <150 mg/dL    VLDL NOT REPORTED 1 - 30 mg/dL   Hemoglobin A1C   Result Value Ref Range    Hemoglobin A1C 5.4 4.8 - 5.9 %    Estimated Avg Glucose 108 mg/dL   Comprehensive Metabolic Panel Result Value Ref Range    Glucose 103 (H) 70 - 99 mg/dL    BUN 13 6 - 20 mg/dL    CREATININE 0.74 0.70 - 1.20 mg/dL    Bun/Cre Ratio 18 9 - 20    Calcium 10.3 8.6 - 10.4 mg/dL    Sodium 138 135 - 144 mmol/L    Potassium 3.9 3.7 - 5.3 mmol/L    Chloride 101 98 - 107 mmol/L    CO2 26 20 - 31 mmol/L    Anion Gap 11 9 - 17 mmol/L    Alkaline Phosphatase 45 40 - 129 U/L    ALT 10 5 - 41 U/L    AST 14 <40 U/L    Total Bilirubin 0.51 0.3 - 1.2 mg/dL    Total Protein 7.5 6.4 - 8.3 g/dL    Alb 4.5 3.5 - 5.2 g/dL    Albumin/Globulin Ratio 1.5 1.0 - 2.5    GFR Non-African American >60 >60 mL/min    GFR African American >60 >60 mL/min    GFR Comment          GFR Staging NOT REPORTED    CBC Auto Differential   Result Value Ref Range    WBC 5.8 3.5 - 11.3 k/uL    RBC 4.82 4.21 - 5.77 m/uL    Hemoglobin 14.5 13.0 - 17.0 g/dL    Hematocrit 44.8 40.7 - 50.3 %    MCV 92.9 82.6 - 102.9 fL    MCH 30.1 25.2 - 33.5 pg    MCHC 32.4 25.2 - 33.5 g/dL    RDW 12.7 11.8 - 14.4 %    Platelets 097 670 - 834 k/uL    MPV 10.1 8.1 - 13.5 fL    NRBC Automated 0.0 0.0 per 100 WBC    Differential Type NOT REPORTED     Seg Neutrophils 66 (H) 36 - 65 %    Lymphocytes 26 24 - 43 %    Monocytes 6 3 - 12 %    Eosinophils % 1 1 - 4 %    Basophils 1 0 - 2 %    Immature Granulocytes 0 0 %    Segs Absolute 3.79 1.50 - 8.10 k/uL    Absolute Lymph # 1.48 1.10 - 3.70 k/uL    Absolute Mono # 0.34 0.10 - 1.20 k/uL    Absolute Eos # 0.08 0.00 - 0.44 k/uL    Basophils Absolute 0.06 0.00 - 0.20 k/uL    Absolute Immature Granulocyte <0.03 0.00 - 0.30 k/uL    WBC Morphology NOT REPORTED     RBC Morphology NOT REPORTED     Platelet Estimate NOT REPORTED       Other review of systems are as noted below. Preventative measures are reviewed today. See health maintenance section for complete preventative plan of care. Did review patient's med list, allergies, social history, fam history, pmhx and pshx today as noted in the record.       Review of Systems Constitutional: Negative for chills, fatigue and fever. HENT: Negative for congestion, ear pain, postnasal drip, rhinorrhea, sinus pressure, sore throat and trouble swallowing. Eyes: Negative for discharge and redness. Respiratory: Negative for cough, shortness of breath and wheezing. Cardiovascular: Negative for chest pain. Gastrointestinal: Negative for abdominal pain, constipation, diarrhea, nausea and vomiting. Genitourinary: Negative for dysuria, flank pain, frequency and urgency. Musculoskeletal: Negative for arthralgias, myalgias and neck pain. Skin: Negative for rash and wound. Allergic/Immunologic: Negative for environmental allergies. Neurological: Negative for dizziness, weakness, light-headedness and headaches. Hematological: Negative for adenopathy. Psychiatric/Behavioral: Negative. Prior to Visit Medications    Medication Sig Taking? Authorizing Provider   risperiDONE (RISPERDAL) 1 MG tablet TAKE 1 TABLET BY MOUTH DAILY  Carito Kind, DO   glimepiride (AMARYL) 4 MG tablet TAKE 1 TABLET BY MOUTH EVERY MORNING  Michael Diaz, DO   atorvastatin (LIPITOR) 40 MG tablet TAKE 1 TABLET BY MOUTH DAILY  Carito Kind, DO   metFORMIN (GLUCOPHAGE-XR) 500 MG extended release tablet TAKE 2 TABLETS BY MOUTH EVERY NIGHT  Carito Kind, DO   GLUCOCARD VITAL TEST strip TEST ONCE DAILY  Carito Kind, DO        Social History     Tobacco Use    Smoking status: Never Smoker    Smokeless tobacco: Never Used   Substance Use Topics    Alcohol use: No        There were no vitals filed for this visit. Estimated body mass index is 30.71 kg/m² as calculated from the following:    Height as of 5/18/20: 5' 8\" (1.727 m). Weight as of 5/18/20: 202 lb (91.6 kg). Physical Exam  Vitals signs and nursing note reviewed. Constitutional:       General: He is not in acute distress. Appearance: Normal appearance. He is well-developed. He is not diaphoretic.    HENT: Head: Normocephalic and atraumatic. Right Ear: Tympanic membrane, ear canal and external ear normal.      Left Ear: Tympanic membrane, ear canal and external ear normal.      Nose: Nose normal.      Mouth/Throat:      Mouth: Mucous membranes are moist.      Pharynx: Oropharynx is clear. No oropharyngeal exudate. Eyes:      General:         Right eye: No discharge. Left eye: No discharge. Conjunctiva/sclera: Conjunctivae normal.      Pupils: Pupils are equal, round, and reactive to light. Neck:      Musculoskeletal: Normal range of motion and neck supple. Thyroid: No thyromegaly. Cardiovascular:      Rate and Rhythm: Normal rate and regular rhythm. Heart sounds: Normal heart sounds. Pulmonary:      Effort: Pulmonary effort is normal.      Breath sounds: Normal breath sounds. No wheezing or rales. Abdominal:      General: Bowel sounds are normal. There is no distension. Palpations: Abdomen is soft. Tenderness: There is no abdominal tenderness. Musculoskeletal:      Comments: Patient had a diabetic foot exam today. No open areas or ulcerations noted. Fine filament testing to the entire dorsal and plantar aspect of the foot reveals good sensation in all areas. No cyanosis. +2/4 pedal pulses, symmetric bilaterally   Lymphadenopathy:      Cervical: No cervical adenopathy. Skin:     General: Skin is warm and dry. Findings: No rash. Neurological:      Mental Status: He is alert and oriented to person, place, and time. Psychiatric:         Behavior: Behavior normal.         Thought Content: Thought content normal.         Judgment: Judgment normal.           ASSESSMENT/PLAN:  Encounter Diagnoses   Name Primary?     Controlled type 2 diabetes mellitus without complication, without long-term current use of insulin ([facility] [address] Yes    Mixed hyperlipidemia     Autism     Routine general medical examination at a health care facility      Orders Placed This Encounter Medications    risperiDONE (RISPERDAL) 1 MG tablet     Sig: Take 1 tablet by mouth daily     Dispense:  30 tablet     Refill:  5    glimepiride (AMARYL) 2 MG tablet     Sig: TAKE 1 TABLET BY MOUTH EVERY MORNING     Dispense:  30 tablet     Refill:  5    atorvastatin (LIPITOR) 40 MG tablet     Sig: TAKE 1 TABLET BY MOUTH DAILY     Dispense:  30 tablet     Refill:  5    metFORMIN (GLUCOPHAGE-XR) 500 MG extended release tablet     Sig: TAKE 2 TABLETS BY MOUTH EVERY NIGHT     Dispense:  60 tablet     Refill:  5     Orders Placed This Encounter   Procedures    CBC Auto Differential     Standing Status:   Future     Standing Expiration Date:   11/20/2021    Comprehensive Metabolic Panel     Standing Status:   Future     Standing Expiration Date:   11/20/2021    Hemoglobin A1C     Standing Status:   Future     Standing Expiration Date:   11/20/2021    Lipid Panel     Standing Status:   Future     Standing Expiration Date:   11/20/2021     Order Specific Question:   Is Patient Fasting?/# of Hours     Answer:   12 hours    Microalbumin, Ur     Standing Status:   Future     Standing Expiration Date:   11/20/2021     Scheduling Instructions: To be done in 6 months    HM DIABETES FOOT EXAM     Will decrease his glimepiride dose to 2 mg daily. Continue with dietary efforts to help with blood sugar control. Patient is to continue on the rest of his current medical therapy. No additional changes are made at this time. Patient is to return to my office in 6 months duration or sooner if any further problems or symptoms arise. (Please note that portions of this note were completed with a voice recognition program. Efforts were made to edit the dictations but occasionally words are mis-transcribed.)        No follow-ups on file. An electronic signature was used to authenticate this note.     --Misha Barba DO on 11/20/2020 at 7:38 AM

## 2020-11-20 NOTE — PATIENT INSTRUCTIONS
Hospital Outpatient Visit on 11/16/2020   Component Date Value Ref Range Status    TSH 11/16/2020 0.92  0.30 - 5.00 mIU/L Final    Cholesterol 11/16/2020 123  <200 mg/dL Final    Comment:    Cholesterol Guidelines:      <200  Desirable   200-240  Borderline      >240  Undesirable         HDL 11/16/2020 62  >40 mg/dL Final    Comment:    HDL Guidelines:    <40     Undesirable   40-59    Borderline    >59     Desirable         LDL Cholesterol 11/16/2020 46  0 - 130 mg/dL Final    Comment:    LDL Guidelines:     <100    Desirable   100-129   Near to/above Desirable   130-159   Borderline      >159   Undesirable     Direct (measured) LDL and calculated LDL are not interchangeable tests.  Chol/HDL Ratio 11/16/2020 2.0  <5 Final            Triglycerides 11/16/2020 74  <150 mg/dL Final    Comment:    Triglyceride Guidelines:     <150   Desirable   150-199  Borderline   200-499  High     >499   Very high   Based on AHA Guidelines for fasting triglyceride, October 2012.          VLDL 11/16/2020 NOT REPORTED  1 - 30 mg/dL Final    Hemoglobin A1C 11/16/2020 5.4  4.8 - 5.9 % Final    Estimated Avg Glucose 11/16/2020 108  mg/dL Final    Glucose 11/16/2020 103* 70 - 99 mg/dL Final    BUN 11/16/2020 13  6 - 20 mg/dL Final    CREATININE 11/16/2020 0.74  0.70 - 1.20 mg/dL Final    Bun/Cre Ratio 11/16/2020 18  9 - 20 Final    Calcium 11/16/2020 10.3  8.6 - 10.4 mg/dL Final    Sodium 11/16/2020 138  135 - 144 mmol/L Final    Potassium 11/16/2020 3.9  3.7 - 5.3 mmol/L Final    Chloride 11/16/2020 101  98 - 107 mmol/L Final    CO2 11/16/2020 26  20 - 31 mmol/L Final    Anion Gap 11/16/2020 11  9 - 17 mmol/L Final    Alkaline Phosphatase 11/16/2020 45  40 - 129 U/L Final    ALT 11/16/2020 10  5 - 41 U/L Final    AST 11/16/2020 14  <40 U/L Final    Total Bilirubin 11/16/2020 0.51  0.3 - 1.2 mg/dL Final    Total Protein 11/16/2020 7.5  6.4 - 8.3 g/dL Final    Alb 11/16/2020 4.5  3.5 - 5.2 g/dL Final    Albumin/Globulin Ratio 11/16/2020 1.5  1.0 - 2.5 Final    GFR Non- 11/16/2020 >60  >60 mL/min Final    GFR  11/16/2020 >60  >60 mL/min Final    GFR Comment 11/16/2020        Final    Comment: Average GFR for 38-51 years old:   80 mL/min/1.73sq m  Chronic Kidney Disease:   <60 mL/min/1.73sq m  Kidney failure:   <15 mL/min/1.73sq m              eGFR calculated using average adult body mass.  Additional eGFR calculator available at:        [employer]            GFR Staging 11/16/2020 NOT REPORTED   Final    WBC 11/16/2020 5.8  3.5 - 11.3 k/uL Final    RBC 11/16/2020 4.82  4.21 - 5.77 m/uL Final    Hemoglobin 11/16/2020 14.5  13.0 - 17.0 g/dL Final    Hematocrit 11/16/2020 44.8  40.7 - 50.3 % Final    MCV 11/16/2020 92.9  82.6 - 102.9 fL Final    MCH 11/16/2020 30.1  25.2 - 33.5 pg Final    MCHC 11/16/2020 32.4  25.2 - 33.5 g/dL Final    RDW 11/16/2020 12.7  11.8 - 14.4 % Final    Platelets 10/88/1321 218  138 - 453 k/uL Final    MPV 11/16/2020 10.1  8.1 - 13.5 fL Final    NRBC Automated 11/16/2020 0.0  0.0 per 100 WBC Final    Differential Type 11/16/2020 NOT REPORTED   Final    Seg Neutrophils 11/16/2020 66* 36 - 65 % Final    Lymphocytes 11/16/2020 26  24 - 43 % Final    Monocytes 11/16/2020 6  3 - 12 % Final    Eosinophils % 11/16/2020 1  1 - 4 % Final    Basophils 11/16/2020 1  0 - 2 % Final    Immature Granulocytes 11/16/2020 0  0 % Final    Segs Absolute 11/16/2020 3.79  1.50 - 8.10 k/uL Final    Absolute Lymph # 11/16/2020 1.48  1.10 - 3.70 k/uL Final    Absolute Mono # 11/16/2020 0.34  0.10 - 1.20 k/uL Final    Absolute Eos # 11/16/2020 0.08  0.00 - 0.44 k/uL Final    Basophils Absolute 11/16/2020 0.06  0.00 - 0.20 k/uL Final    Absolute Immature Granulocyte 11/16/2020 <0.03  0.00 - 0.30 k/uL Final    WBC Morphology 11/16/2020 NOT REPORTED   Final    RBC Morphology 11/16/2020 NOT REPORTED   Final    Platelet Estimate 11/16/2020 NOT REPORTED   Final       Personalized Preventive Plan for Marii Munoz - 11/20/2020  Medicare offers a range of preventive health benefits. Some of the tests and screenings are paid in full while other may be subject to a deductible, co-insurance, and/or copay. Some of these benefits include a comprehensive review of your medical history including lifestyle, illnesses that may run in your family, and various assessments and screenings as appropriate. After reviewing your medical record and screening and assessments performed today your provider may have ordered immunizations, labs, imaging, and/or referrals for you. A list of these orders (if applicable) as well as your Preventive Care list are included within your After Visit Summary for your review. Other Preventive Recommendations:    · A preventive eye exam performed by an eye specialist is recommended every 1-2 years to screen for glaucoma; cataracts, macular degeneration, and other eye disorders. · A preventive dental visit is recommended every 6 months. · Try to get at least 150 minutes of exercise per week or 10,000 steps per day on a pedometer . · Order or download the FREE \"Exercise & Physical Activity: Your Everyday Guide\" from [employer]. Call 9-856.762.4132 or search [employer] online. · You need 6173-9576 mg of calcium and 4577-4152 IU of vitamin D per day. It is possible to meet your calcium requirement with diet alone, but a vitamin D supplement is usually necessary to meet this goal.  · When exposed to the sun, use a sunscreen that protects against both UVA and UVB radiation with an SPF of 30 or greater. Reapply every 2 to 3 hours or after sweating, drying off with a towel, or swimming. · Always wear a seat belt when traveling in a car. Always wear a helmet when riding a bicycle or motorcycle.

## 2020-11-20 NOTE — PROGRESS NOTES
Medicare Annual Wellness Visit  Name: Micah Hayes Date: 2020   MRN: [id number] Sex: Male   Age: 50 y.o. Ethnicity: Non-/Non    : 1975 Race: Serafin Armstrong is here for Medicare AWV (subsequent; last 2019); Diabetes (6 mo f/u); Hyperlipidemia (6 mof /u); Other (autism; 6 mo f/u); and Discuss Labs (drawn 2020)    Screenings for behavioral, psychosocial and functional/safety risks, and cognitive dysfunction are all negative except as indicated below. These results, as well as other patient data from the [address] form, are documented in Flowsheets linked to this Encounter. No Known Allergies    Prior to Visit Medications    Medication Sig Taking? Authorizing Provider   risperiDONE (RISPERDAL) 1 MG tablet Take 1 tablet by mouth daily Yes Damian Saravia DO   glimepiride (AMARYL) 2 MG tablet TAKE 1 TABLET BY MOUTH EVERY MORNING Yes Damian Saravia DO   atorvastatin (LIPITOR) 40 MG tablet TAKE 1 TABLET BY MOUTH DAILY Yes Damian Saravia DO   metFORMIN (GLUCOPHAGE-XR) 500 MG extended release tablet TAKE 2 TABLETS BY MOUTH EVERY NIGHT Yes Damian Saravia DO   GLUCOCARD VITAL TEST strip TEST ONCE DAILY Yes Damian Saravia DO       Past Medical History:   Diagnosis Date    Autism     Does not communicate    Obesity     Skin lesions     Both arms with scarring secondary to biting. This is a behavior/emotional problem. History reviewed. No pertinent surgical history.     Family History   Problem Relation Age of Onset    High Cholesterol Mother     High Blood Pressure Mother     Diabetes Mother     High Cholesterol Father        CareTeam (Including outside providers/suppliers regularly involved in providing care):   Patient Care Team:  Damian Saravia DO as PCP - General (Family Medicine)  Damian Saravia DO as PCP - [facility] Empaneled Provider    Wt Readings from Last 3 Encounters:   20 156 lb (70.8 kg)   20 202 lb (91.6 kg)   12/20/19 209 lb (94.8 kg)     Vitals:    11/20/20 1330   BP: 120/80   Site: Left Upper Arm   Position: Sitting   Cuff Size: Medium Adult   Pulse: 120   Resp: 18   SpO2: 98%   Weight: 156 lb (70.8 kg)   Height: 5' 8\" (1.727 m)     Body mass index is 23.72 kg/m². Based upon direct observation of the patient, evaluation of cognition reveals global memory impairment noted. Patient's complete Health Risk Assessment and screening values have been reviewed and are found in Flowsheets. The following problems were reviewed today and where indicated follow up appointments were made and/or referrals ordered. Positive Risk Factor Screenings with Interventions:     General Health and ACP:  General  In the past 7 days, have you experienced any of the following? New or Increased Pain, New or Increased Fatigue, Loneliness, Social Isolation, Stress or Anger?: None of These  Do you get the social and emotional support that you need?: Yes  Do you have a Living Will?: (!) No  Advance Directives     Power of [facility] Will ACP-Advance Directive ACP-Power of     Not on File Not on File Filed [address] [place] [address] Risk Interventions:  · No Living Will: has guardian.       Health Habits/Nutrition:  Health Habits/Nutrition  Do you exercise for at least 20 minutes 2-3 times per week?: Yes  Have you lost any weight without trying in the past 3 months?: No  Do you eat fewer than 2 meals per day?: No  Have you seen a dentist within the past year?: (!) No(are working on getting dental appt)  Body mass index: 23.72  Health Habits/Nutrition Interventions:  · Dental exam overdue:  patient encouraged to make appointment with his/her dentist    Hearing/Vision:  No exam data present  Hearing/Vision  Do you or your family notice any trouble with your hearing?: No  Do you have difficulty driving, watching TV, or doing any of your daily activities because of your eyesight?: No  Have you had an eye exam within the

## 2020-11-23 ENCOUNTER — TELEPHONE (OUTPATIENT)
Dept: FAMILY MEDICINE CLINIC | Age: 45
End: 2020-11-23

## 2020-11-23 NOTE — TELEPHONE ENCOUNTER
Can the Glimepiride decrease start on the 11/27/2020 not on 11/212/20? Patient was in to see Dr. Parvez Carter on Friday 11/20/2020. Now they will need a note typed out for this.

## 2020-11-23 NOTE — LETTER
Dillon MCCARTY department of [facility]  Phone: [phone]  Fax: Christina, DO        November 24, 2020    [address]      To Whom It May Concern:    [facility] may continue current dosing of glimepiride (4 mg) until current pill packs are used up, and may decrease dosage to 2 mg with new pill packs on 11/27/2020. If you have any questions or concerns, please don't hesitate to call.     Sincerely,        Precious Dean, DO

## 2020-11-24 NOTE — TELEPHONE ENCOUNTER
Spoke with [facility], states that patient's medications come in a pill pack from [facility] and they would have to open the packages and split the pill. States that the nurse would have to do this and the nurse only comes out certain times of the week so this would be a special trip. The current pill pack is out until 11/27/2020. Letter typed to approve. Please print when [facility] stops in to  this afternoon. Thank you!

## 2021-01-07 ENCOUNTER — OFFICE VISIT (OUTPATIENT)
Dept: PRIMARY CARE CLINIC | Age: 46
End: 2021-01-07
Payer: MEDICARE

## 2021-01-07 ENCOUNTER — HOSPITAL ENCOUNTER (OUTPATIENT)
Age: 46
Setting detail: SPECIMEN
Discharge: HOME | End: 2021-01-07
Payer: MEDICARE

## 2021-01-07 VITALS
DIASTOLIC BLOOD PRESSURE: 74 MMHG | TEMPERATURE: 97.9 F | RESPIRATION RATE: 20 BRPM | WEIGHT: 146.2 LBS | OXYGEN SATURATION: 100 % | BODY MASS INDEX: 22.16 KG/M2 | HEIGHT: 68 IN | SYSTOLIC BLOOD PRESSURE: 118 MMHG | HEART RATE: 136 BPM

## 2021-01-07 DIAGNOSIS — R09.81 SINUS CONGESTION: Primary | ICD-10-CM

## 2021-01-07 DIAGNOSIS — R09.81 SINUS CONGESTION: ICD-10-CM

## 2021-01-07 LAB
INFLUENZA A ANTIBODY: NEGATIVE
INFLUENZA B ANTIBODY: NEGATIVE

## 2021-01-07 PROCEDURE — G8484 FLU IMMUNIZE NO ADMIN: HCPCS | Performed by: NURSE PRACTITIONER

## 2021-01-07 PROCEDURE — U0003 INFECTIOUS AGENT DETECTION BY NUCLEIC ACID (DNA OR RNA); SEVERE ACUTE RESPIRATORY SYNDROME CORONAVIRUS 2 (SARS-COV-2) (CORONAVIRUS DISEASE [COVID-19]), AMPLIFIED PROBE TECHNIQUE, MAKING USE OF HIGH THROUGHPUT TECHNOLOGIES AS DESCRIBED BY CMS-2020-01-R: HCPCS

## 2021-01-07 PROCEDURE — G8420 CALC BMI NORM PARAMETERS: HCPCS | Performed by: NURSE PRACTITIONER

## 2021-01-07 PROCEDURE — 99213 OFFICE O/P EST LOW 20 MIN: CPT | Performed by: NURSE PRACTITIONER

## 2021-01-07 PROCEDURE — G8427 DOCREV CUR MEDS BY ELIG CLIN: HCPCS | Performed by: NURSE PRACTITIONER

## 2021-01-07 PROCEDURE — 1036F TOBACCO NON-USER: CPT | Performed by: NURSE PRACTITIONER

## 2021-01-07 PROCEDURE — 99212 OFFICE O/P EST SF 10 MIN: CPT | Performed by: NURSE PRACTITIONER

## 2021-01-07 PROCEDURE — 87804 INFLUENZA ASSAY W/OPTIC: CPT | Performed by: NURSE PRACTITIONER

## 2021-01-07 RX ORDER — ECHINACEA PURPUREA EXTRACT 125 MG
2 TABLET ORAL PRN
Qty: 1 BOTTLE | Refills: 1 | Status: SHIPPED | OUTPATIENT
Start: 2021-01-07 | End: 2021-01-08 | Stop reason: SDUPTHER

## 2021-01-07 ASSESSMENT — PATIENT HEALTH QUESTIONNAIRE - PHQ9: 2. FEELING DOWN, DEPRESSED OR HOPELESS: 0

## 2021-01-07 NOTE — PATIENT INSTRUCTIONS
Will notify you of COVID test results as soon as available. You should isoloate at home in an area away from family. If you must be around family members, please wear a mask. Quarantine at home until result is available. This means do not go to work/school, attend family gatherings, or invite others to your home until you know your test results. Patient Education        Learning About Coronavirus [phone])  Coronavirus [phone]): Overview  What is coronavirus (PITLL-23)? The coronavirus disease (COVID-19) is caused by a virus. It is an illness that was first found in December 2019. It has since spread worldwide. The virus can cause fever, cough, and trouble breathing. In severe cases, it can cause pneumonia and make it hard to breathe without help. It can cause death. This virus spreads person-to-person through droplets from coughing and sneezing. It can also spread when you are close to someone who is infected. And it can spread when you touch something that has the virus on it, such as a doorknob or a tabletop. Coronaviruses are a large group of viruses. They cause the common cold. They also cause more serious illnesses like Middle East respiratory syndrome (MERS) and severe acute respiratory syndrome (SARS). COVID-19 is caused by a novel coronavirus. That means it's a new type that has not been seen in people before. How is COVID-19 treated? Mild illness can be treated at home, but more serious illness needs to be treated in the hospital. Treatment may include medicines to reduce symptoms, plus breathing support such as oxygen therapy or a ventilator. Other treatments, such as antiviral medicines, may help people who have COVID-19. What can you do to protect yourself from COVID-19? The best way to protect yourself from getting sick is to:  · Avoid areas where there is an outbreak. · Avoid contact with people who may be infected.   · Avoid crowds and try to stay at least 6 feet away from other people. · Wash your hands often, especially after you cough or sneeze. Use soap and water, and scrub for at least 20 seconds. If soap and water aren't available, use an alcohol-based hand . · Avoid touching your mouth, nose, and eyes. What can you do to avoid spreading the virus to others? To help avoid spreading the virus to others:  · Wash your hands often with soap or alcohol-based hand sanitizers. · Cover your mouth with a tissue when you cough or sneeze. Then throw the tissue in the trash. · Use a disinfectant to clean things that you touch often. These include doorknobs, remote controls, phones, and handles on your refrigerator and microwave. And don't forget countertops, tabletops, bathrooms, and computer keyboards. · Wear a cloth face cover if you have to go to public areas. If you know or suspect that you have COVID-19:  · Stay home. Don't go to school, work, or public areas. And don't use public transportation, ride-shares, or taxis unless you have no choice. · Leave your home only if you need to get medical care or testing. But call the doctor's office first so they know you're coming. And wear a face cover. · Limit contact with people in your home. If possible, stay in a separate bedroom and use a separate bathroom. · Wear a face cover whenever you're around other people. It can help stop the spread of the virus when you cough or sneeze. · Clean and disinfect your home every day. Use household  and disinfectant wipes or sprays. Take special care to clean things that you grab with your hands. · Self-isolate until it's safe to be around others again. ? If you have symptoms, it's safe when you haven't had a fever for 3 days and your symptoms have improved and it's been at least 10 days since your symptoms started. ? If you were exposed to the virus but don't have symptoms, it's safe to be around others 14 days after exposure.   ? Talk to your doctor about whether you also need testing, especially if you have a weakened immune system. When to call for help  Call 911 anytime you think you may need emergency care. For example, call if:  · You have severe trouble breathing. (You can't talk at all.)  · You have constant chest pain or pressure. · You are severely dizzy or lightheaded. · You are confused or can't think clearly. · Your face and lips have a blue color. · You passed out (lost consciousness) or are very hard to wake up. Call your doctor now if you develop symptoms such as:  · Shortness of breath. · Fever. · Cough. If you need to get care, call ahead to the doctor's office for instructions before you go. Make sure you wear a face cover to prevent exposing other people to the virus. Where can you get the latest information? The following health organizations are tracking and studying this virus. Their websites contain the most up-to-date information. Roscoe Luna also learn what to do if you think you may have been exposed to the virus. · U.S. Centers for Disease Control and Prevention (CDC): The CDC provides updated news about the disease and travel advice. The website also tells you how to prevent the spread of infection. www.cdc.gov  · World Health Organization [facility]): WHO offers information about the virus outbreaks. WHO also has travel advice. www.who.int  Current as of: July 10, 2020               Content Version: 12.6  © 1985-2872 [employer]. Care instructions adapted under license by [facility] ([facility]). If you have questions about a medical condition or this instruction, always ask your healthcare professional. [address] any warranty or liability for your use of this information.

## 2021-01-07 NOTE — LETTER
[address] [facility]  [address] [facility] [address]  Phone: [phone]  Fax: [phone]        MARGARITO Roque CNP      January 7, 2021    Patient:   Armin Bartlett  Date of Birth   1975  Date of visit   1/7/2021        To Whom it May Concern:      Armin Bartlett was seen in my clinic on 1/7/2021. Please excuse from work until negative COVID results received and marked symptom improvement. If you have any questions or concerns, please don't hesitate to call.       Sincerely,      MAGRARITO Roque CNP/fabien

## 2021-01-07 NOTE — PROGRESS NOTES
[facility]  [facility]  [address] [facility] [place], [facility]  Phone: [phone]   Phone: [phone]  Fax: [phone]   Fax: [phone]      Date: 1/7/2021   Patient:  Sulaiman Hill   YOB: 1975 Age: 50 y.o. MRN: [id number]   PCP: Chelsea Liu DO       Subjective:    Chief Complaint   Patient presents with    Congestion     nasal ,no fever,started this morning       HPI: Patient presents with complaints of sinus congestion for the past 1 day. They have tried no treatments. The patient has autism, so history is obtained from his healthcare manager. He lives in a group home. His appetite has been good. He has not had fevers or hypoxia per home health checks. They deny fever, cough, fatigue, body aches, loss of sense of taste, loss of sense of smell, sore throat, otalgia, eye irritation, mouth/lip sores or irritation, swelling of hands or feet, vomiting, diarrhea, or new rash. They deny any underlying chronic: heart disease, lung disease, kidney disease, or liver disease. They have not been tested for Covid-19 before. He has underlying DMII. All other review of systems negative. History is obtained from the patient's health care manager who is present in room with the patient, and previous medical records, including any pertinent recent lab/imaging results in EMR and/or Care Everywhere (external results), encounter notes available in EMR, and encounter notes available in Care Everywhere (external notes). Significant family, medical, surgical, and social history reviewed.        Patient Active Problem List   Diagnosis    Mixed hyperlipidemia    Impaired fasting blood sugar    Autism disorder    Elevated blood pressure reading    Controlled type 2 diabetes mellitus without complication, without long-term current use of insulin (HCC)       Past Medical History:   Diagnosis Date    Autism     Does not communicate    Obesity     Skin lesions     Both arms with scarring secondary to biting. This is a behavior/emotional problem. Objective:    Physical Exam:  Vitals: /74   Pulse 136   Temp 97.9 °F (36.6 °C) (Temporal)   Resp 20   Ht 5' 8\" (1.727 m)   Wt 146 lb 3.2 oz (66.3 kg)   SpO2 100%   BMI 22.23 kg/m²     LABS:  CBC:  No results for input(s): WBC, HGB, PLT in the last 72 hours. BMP:  No results for input(s): NA, K, CL, CO2, BUN, CREATININE, GLUCOSE in the last 72 hours. Hepatic:  No results for input(s): AST, ALT, ALB, BILITOT, ALKPHOS in the last 72 hours. Pertinent lab and radiology results reviewed. General Appearance: well developed, well nourished, and in no acute distress  Skin: warm and dry, no rash, no erythema  Head: with craniofacial abnormalities and atraumatic  Eyes: sclerae white, conjunctivae normal  ENT: left ear canal with dark brown cerumen, left external ear normal  right ear canal with dark brown cerumen, right external ear normal  nose without deformity, nasal mucosa and turbinates congested, sinuses seem to be non-tender  pharynx normal  Pulmonary/Chest: clear to auscultation bilaterally -- no wheezes, rales or rhonchi, normal air movement, no respiratory distress  Cardiovascular: tachycardic rate, regular rhythm, normal S1 and S2, no audible murmurs, rubs, or clicks, distal pulses intact  Abdomen: soft, non-tender, non-distended, normal bowel sounds, no masses or organomegaly  Extremities: no cyanosis, no clubbing  Neurologic: no acute gross cranial nerve deficit noted, gait normal, and mostly nonverbal at baseline, makes occasional vocalizations      Assessment and Plan:     Diagnosis Orders   1.  Sinus congestion  POCT Influenza A/B    COVID-19 Ambulatory     Orders Placed This Encounter   Medications    sodium chloride (ALTAMIST SPRAY) 0.65 % nasal spray     Si sprays by Nasal route as needed for Congestion     Dispense:  1 Bottle     Refill:  1 POCT influenza negative. Minimal apparent symptoms, but due to being mostly non-verbal and living in a group home setting, health care manager agrees that ruling out covid-19 is important. Patient very cooperative with specimen collection and tolerates well. Nasopharyngeal swab obtained today to test for COVID-19. Patient education provided including necessary self-quarantine until results received. Typical illness duration and progression reviewed. Treatment options discussed. Patient/caregiver will be called with COVID-19 results. Supportive care encouraged (hydrate, humidifier, tylenol prn following package instructions, saline nasal spray/sinus rinses prn, and warm salt water gargles prn). Encouraged hand hygiene, cover cough/sneeze, avoid touching face, and sanitize high-touch surfaces frequently. To ER if significant shortness of breath, signs of respiratory distress/failure develop, or unable to remain hydrated. Follow up with PCP, sooner as needed. Return or go to an urgent care or emergency room if symptoms worsen, fail to improve, or new symptoms arise. The use, risks, benefits, and side effects of prescribed or recommended medications were discussed. If any testing was ordered at this visit, the patient was educated regarding result interpretation. All questions were answered and the patient/caregiver voiced understanding.          Electronically signed by RG Jones, [id number] on 1/7/2021 at 9:02 AM  Internal Medicine

## 2021-01-08 ENCOUNTER — TELEPHONE (OUTPATIENT)
Dept: FAMILY MEDICINE CLINIC | Age: 46
End: 2021-01-08

## 2021-01-08 RX ORDER — ECHINACEA PURPUREA EXTRACT 125 MG
TABLET ORAL
Qty: 1 BOTTLE | Refills: 2 | Status: SHIPPED | OUTPATIENT
Start: 2021-01-08 | End: 2021-02-22 | Stop reason: SDUPTHER

## 2021-01-08 NOTE — TELEPHONE ENCOUNTER
[facility]  calling stating a nasal spray was sent to pharmacy, but is not written correctly, for group home purposes it needs to states 2 sprays each nostril 2x daily for congestion and for how many days, please advise at above number.

## 2021-01-08 NOTE — PROGRESS NOTES
[facility] needs new updated script order for saline nasal spray with exact directions.  Script printed out for Dr Jl Mancini to sign and will fax to [phone]

## 2021-01-08 NOTE — TELEPHONE ENCOUNTER
Silvina Davey NP seen patient yesterday through  and provided script. Left a voicemail on [facility] phone stating he could take the nasal saline spray 2 sprays in each nostril three times daily x 1 wk and to notify us if symptoms still present after this.

## 2021-01-09 LAB — SARS-COV-2, NAA: NOT DETECTED

## 2021-02-22 ENCOUNTER — OFFICE VISIT (OUTPATIENT)
Dept: FAMILY MEDICINE CLINIC | Age: 46
End: 2021-02-22
Payer: MEDICARE

## 2021-02-22 VITALS
HEIGHT: 68 IN | RESPIRATION RATE: 18 BRPM | BODY MASS INDEX: 21.44 KG/M2 | OXYGEN SATURATION: 99 % | WEIGHT: 141.5 LBS | HEART RATE: 84 BPM | SYSTOLIC BLOOD PRESSURE: 114 MMHG | DIASTOLIC BLOOD PRESSURE: 80 MMHG

## 2021-02-22 DIAGNOSIS — K62.89 RECTAL IRRITATION: Primary | ICD-10-CM

## 2021-02-22 PROCEDURE — 99213 OFFICE O/P EST LOW 20 MIN: CPT | Performed by: FAMILY MEDICINE

## 2021-02-22 PROCEDURE — G8420 CALC BMI NORM PARAMETERS: HCPCS | Performed by: FAMILY MEDICINE

## 2021-02-22 PROCEDURE — G8427 DOCREV CUR MEDS BY ELIG CLIN: HCPCS | Performed by: FAMILY MEDICINE

## 2021-02-22 PROCEDURE — 1036F TOBACCO NON-USER: CPT | Performed by: FAMILY MEDICINE

## 2021-02-22 PROCEDURE — G8484 FLU IMMUNIZE NO ADMIN: HCPCS | Performed by: FAMILY MEDICINE

## 2021-02-22 RX ORDER — HYDROCORTISONE 25 MG/G
CREAM TOPICAL
Qty: 28 G | Refills: 2 | Status: SHIPPED | OUTPATIENT
Start: 2021-02-22

## 2021-02-22 RX ORDER — HYDROCORTISONE 25 MG/G
CREAM TOPICAL
Qty: 28 G | Refills: 2 | Status: SHIPPED | OUTPATIENT
Start: 2021-02-22 | End: 2021-02-22 | Stop reason: SDUPTHER

## 2021-02-22 RX ORDER — ECHINACEA PURPUREA EXTRACT 125 MG
TABLET ORAL
Qty: 1 BOTTLE | Refills: 3 | Status: SHIPPED | OUTPATIENT
Start: 2021-02-22

## 2021-02-22 ASSESSMENT — ENCOUNTER SYMPTOMS
NAUSEA: 0
DIARRHEA: 0
RECTAL PAIN: 1
ABDOMINAL PAIN: 0
CONSTIPATION: 0
VOMITING: 0

## 2021-02-22 NOTE — PROGRESS NOTES
2021     Jolynn Larson (:  1975) is a 50 y.o. male, here for evaluation of the following medical concerns:    HPI  Patient comes in today with his group home caregiver due to issues in the last week or 2 with rectal irritation. Apparently patient did have an issue at [facility]. where he did have itching of his rectum and had a bowel accident. Caregiver really did not think much of it as it was one incident but then since that time he has been reported as having more rectal irritation and itching while at [facility].  She notes there is a new staff member that does not seem to do things the same way as the other caregiver so she is not sure if this is a behavioral issue or if there is something really wrong. She states that he does go to the bathroom quite frequently and does not sit for prolonged periods of time. She noise presumes that he is urinating most times. He is moving constantly throughout the day and is very active. She states that she has had to assist him with wiping when he gets done intermittently as he does not noise get himself completely clean and smear stool quite a bit when he does wipe. They have never really had an issue like this up until recently. The only days that he had issues at [facility] was on the days when the new staff member had taken him to [facility].  That is why she was not sure if it was a behavioral issue. He has been eating well. It is noted that his weight continues to drop. She does try to portion his meals but wonders if he is losing too much weight. He does eat healthy foods and will eat snacks that are relatively healthy. I did advise her that as he is very active noting that he is up and down all day it does walk up and down their stairs throughout the day continuously likely his metabolism is quite high. He may need a higher caloric intake due to the fact that he does not really sit still.   She can give him a little bit higher portion sizes to prevent him from losing more weight. Also discussed adding supplemental meal shakes as needed for additional calories. Did review patient's med list, allergies, social history,pmhx and pshx today as noted in the record. Review of Systems   Constitutional: Negative for chills, fatigue and fever. Gastrointestinal: Positive for rectal pain. Negative for abdominal pain, constipation, diarrhea, nausea and vomiting. Rectal irritation       Prior to Visit Medications    Medication Sig Taking? Authorizing Provider   sodium chloride (ALTAMIST SPRAY) 0.65 % nasal spray Use 2 sprays in each nostril three times daily x 14 days  Rachele Diaz, DO   risperiDONE (RISPERDAL) 1 MG tablet Take 1 tablet by mouth daily  Rachele Diaz, DO   glimepiride (AMARYL) 2 MG tablet TAKE 1 TABLET BY MOUTH EVERY MORNING  Nolanlldelia Diaz, DO   atorvastatin (LIPITOR) 40 MG tablet TAKE 1 TABLET BY MOUTH DAILY  Lexy Guess, DO   metFORMIN (GLUCOPHAGE-XR) 500 MG extended release tablet TAKE 2 TABLETS BY MOUTH EVERY NIGHT  Lexy Guess, DO   GLUCOCARD VITAL TEST strip TEST ONCE DAILY  Lexy Guess, DO        Social History     Tobacco Use    Smoking status: Never Smoker    Smokeless tobacco: Never Used   Substance Use Topics    Alcohol use: No        There were no vitals filed for this visit. Estimated body mass index is 22.23 kg/m² as calculated from the following:    Height as of 1/7/21: 5' 8\" (1.727 m). Weight as of 1/7/21: 146 lb 3.2 oz (66.3 kg). Physical Exam  Vitals signs and nursing note reviewed. Constitutional:       General: He is not in acute distress. Appearance: He is well-developed. He is not diaphoretic. HENT:      Head: Normocephalic and atraumatic. Eyes:      Conjunctiva/sclera: Conjunctivae normal.   Neck:      Musculoskeletal: Normal range of motion. Cardiovascular:      Rate and Rhythm: Normal rate and regular rhythm.    Pulmonary:      Effort: Pulmonary effort is normal. Breath sounds: Normal breath sounds. No wheezing, rhonchi or rales. Abdominal:      General: Abdomen is flat. Bowel sounds are normal. There is no distension. Tenderness: There is no abdominal tenderness. Genitourinary:     Comments: Erythema to the perirectal area with slight residual stool on the skin. Small hemorrhoid present without significant swelling or visible pain with palpation. No open areas noted. Skin:     General: Skin is warm and dry. Coloration: Skin is not pale. Findings: No erythema or rash. Neurological:      Mental Status: He is alert and oriented to person, place, and time. Psychiatric:         Behavior: Behavior normal.         Thought Content: Thought content normal.         Judgment: Judgment normal.         ASSESSMENT/PLAN:  Encounter Diagnosis   Name Primary?  Rectal irritation Yes     Suspect from poor wiping with some residual stool left on skin. Would recommend use of wipes and assist with cleaning after bowel movements. Orders Placed This Encounter   Medications    sodium chloride (ALTAMIST SPRAY) 0.65 % nasal spray     Sig: Use 2 sprays in each nostril three times daily prn dryness     Dispense:  1 Bottle     Refill:  3    DISCONTD: hydrocortisone (ANUSOL-HC) 2.5 % CREA rectal cream     Sig: Apply to perirectal area as needed for itching or irritation     Dispense:  28 g     Refill:  2    DISCONTD: hydrocortisone (ANUSOL-HC) 2.5 % CREA rectal cream     Sig: Apply to perirectal area as needed for itching or irritation     Dispense:  28 g     Refill:  2    hydrocortisone (ANUSOL-HC) 2.5 % CREA rectal cream     Sig: Apply to perirectal area as needed for itching or irritation     Dispense:  28 g     Refill:  2     Will give anusol HC cream to use to the area as needed for visible irritation or itching. Return  if no improvement in symptoms or if any further symptoms arise. No follow-ups on file.     An electronic signature was used to authenticate this note.     --Sonia Styles, DO on 2/22/2021 at 7:16 AM

## 2021-04-14 RX ORDER — METFORMIN HYDROCHLORIDE 500 MG/1
TABLET, EXTENDED RELEASE ORAL
Qty: 60 TABLET | Refills: 5 | Status: SHIPPED | OUTPATIENT
Start: 2021-04-14 | End: 2021-08-26 | Stop reason: HOSPADM

## 2021-04-14 RX ORDER — GLIMEPIRIDE 2 MG/1
TABLET ORAL
Qty: 30 TABLET | Refills: 5 | Status: SHIPPED | OUTPATIENT
Start: 2021-04-14 | End: 2021-08-26 | Stop reason: HOSPADM

## 2021-04-14 RX ORDER — ATORVASTATIN CALCIUM 40 MG/1
TABLET, FILM COATED ORAL
Qty: 30 TABLET | Refills: 5 | Status: SHIPPED | OUTPATIENT
Start: 2021-04-14 | End: 2021-10-27

## 2021-04-14 RX ORDER — RISPERIDONE 1 MG/1
1 TABLET, FILM COATED ORAL DAILY
Qty: 30 TABLET | Refills: 5 | Status: SHIPPED | OUTPATIENT
Start: 2021-04-14 | End: 2021-05-28 | Stop reason: SDUPTHER

## 2021-04-14 NOTE — TELEPHONE ENCOUNTER
Harrison Purcell called requesting a refill of the below medication which has been pended for you:     Requested Prescriptions     Pending Prescriptions Disp Refills    glimepiride (AMARYL) 2 MG tablet [Pharmacy Med Name: GLIMEPIRIDE 2 MG TAB 2 Tablet] 30 tablet 5     Sig: TAKE 1 TABLET BY MOUTH EVERY MORNING    risperiDONE (RISPERDAL) 1 MG tablet [Pharmacy Med Name: RISPERIDONE 1 MG TABS 1 Tablet] 30 tablet 5     Sig: TAKE 1 TABLET BY MOUTH DAILY    metFORMIN (GLUCOPHAGE-XR) 500 MG extended release tablet [Pharmacy Med Name: METFORMIN HCL  MG  Tablet] 60 tablet 5     Sig: TAKE 2 TABLETS BY MOUTH EVERY NIGHT    atorvastatin (LIPITOR) 40 MG tablet [Pharmacy Med Name: ATORVASTATIN 40 MG TABLET 40 Tablet] 30 tablet 5     Sig: TAKE 1 TABLET BY MOUTH DAILY       Last Appointment Date: 2/22/2021  Next Appointment Date: 5/24/2021    No Known Allergies

## 2021-05-17 ENCOUNTER — HOSPITAL ENCOUNTER (OUTPATIENT)
Dept: LAB | Age: 46
Discharge: HOME | End: 2021-05-17
Payer: MEDICARE

## 2021-05-17 DIAGNOSIS — E11.9 CONTROLLED TYPE 2 DIABETES MELLITUS WITHOUT COMPLICATION, WITHOUT LONG-TERM CURRENT USE OF INSULIN (HCC): ICD-10-CM

## 2021-05-17 DIAGNOSIS — E78.2 MIXED HYPERLIPIDEMIA: ICD-10-CM

## 2021-05-17 LAB
ABSOLUTE EOS #: 0.11 K/UL (ref 0–0.44)
ABSOLUTE IMMATURE GRANULOCYTE: <0.03 K/UL (ref 0–0.3)
ABSOLUTE LYMPH #: 1.42 K/UL (ref 1.1–3.7)
ABSOLUTE MONO #: 0.32 K/UL (ref 0.1–1.2)
ALBUMIN SERPL-MCNC: 4.5 G/DL (ref 3.5–5.2)
ALBUMIN/GLOBULIN RATIO: 1.7 (ref 1–2.5)
ALP BLD-CCNC: 37 U/L (ref 40–129)
ALT SERPL-CCNC: 13 U/L (ref 5–41)
ANION GAP SERPL CALCULATED.3IONS-SCNC: 9 MMOL/L (ref 9–17)
AST SERPL-CCNC: 18 U/L
BASOPHILS # BLD: 1 % (ref 0–2)
BASOPHILS ABSOLUTE: 0.04 K/UL (ref 0–0.2)
BILIRUB SERPL-MCNC: 0.6 MG/DL (ref 0.3–1.2)
BUN BLDV-MCNC: 15 MG/DL (ref 6–20)
BUN/CREAT BLD: 21 (ref 9–20)
CALCIUM SERPL-MCNC: 9.9 MG/DL (ref 8.6–10.4)
CHLORIDE BLD-SCNC: 106 MMOL/L (ref 98–107)
CHOLESTEROL/HDL RATIO: 2
CHOLESTEROL: 125 MG/DL
CO2: 29 MMOL/L (ref 20–31)
CREAT SERPL-MCNC: 0.73 MG/DL (ref 0.7–1.2)
CREATININE URINE: 163.6 MG/DL (ref 39–259)
DIFFERENTIAL TYPE: NORMAL
EOSINOPHILS RELATIVE PERCENT: 2 % (ref 1–4)
ESTIMATED AVERAGE GLUCOSE: 105 MG/DL
GFR AFRICAN AMERICAN: >60 ML/MIN
GFR NON-AFRICAN AMERICAN: >60 ML/MIN
GFR SERPL CREATININE-BSD FRML MDRD: ABNORMAL ML/MIN/{1.73_M2}
GFR SERPL CREATININE-BSD FRML MDRD: ABNORMAL ML/MIN/{1.73_M2}
GLUCOSE BLD-MCNC: 99 MG/DL (ref 70–99)
HBA1C MFR BLD: 5.3 % (ref 4–6)
HCT VFR BLD CALC: 40.9 % (ref 40.7–50.3)
HDLC SERPL-MCNC: 62 MG/DL
HEMOGLOBIN: 13.4 G/DL (ref 13–17)
IMMATURE GRANULOCYTES: 0 %
LDL CHOLESTEROL: 52 MG/DL (ref 0–130)
LYMPHOCYTES # BLD: 28 % (ref 24–43)
MCH RBC QN AUTO: 30.9 PG (ref 25.2–33.5)
MCHC RBC AUTO-ENTMCNC: 32.8 G/DL (ref 25.2–33.5)
MCV RBC AUTO: 94.2 FL (ref 82.6–102.9)
MICROALBUMIN/CREAT 24H UR: <12 MG/L
MICROALBUMIN/CREAT UR-RTO: NORMAL MCG/MG CREAT
MONOCYTES # BLD: 6 % (ref 3–12)
NRBC AUTOMATED: 0 PER 100 WBC
PDW BLD-RTO: 12.9 % (ref 11.8–14.4)
PLATELET # BLD: 204 K/UL (ref 138–453)
PLATELET ESTIMATE: NORMAL
PMV BLD AUTO: 9.9 FL (ref 8.1–13.5)
POTASSIUM SERPL-SCNC: 4.2 MMOL/L (ref 3.7–5.3)
RBC # BLD: 4.34 M/UL (ref 4.21–5.77)
RBC # BLD: NORMAL 10*6/UL
SEG NEUTROPHILS: 63 % (ref 36–65)
SEGMENTED NEUTROPHILS ABSOLUTE COUNT: 3.16 K/UL (ref 1.5–8.1)
SODIUM BLD-SCNC: 144 MMOL/L (ref 135–144)
TOTAL PROTEIN: 7.2 G/DL (ref 6.4–8.3)
TRIGL SERPL-MCNC: 56 MG/DL
VLDLC SERPL CALC-MCNC: NORMAL MG/DL (ref 1–30)
WBC # BLD: 5.1 K/UL (ref 3.5–11.3)
WBC # BLD: NORMAL 10*3/UL

## 2021-05-17 PROCEDURE — 80053 COMPREHEN METABOLIC PANEL: CPT

## 2021-05-17 PROCEDURE — 36415 COLL VENOUS BLD VENIPUNCTURE: CPT

## 2021-05-17 PROCEDURE — 85025 COMPLETE CBC W/AUTO DIFF WBC: CPT

## 2021-05-17 PROCEDURE — 80061 LIPID PANEL: CPT

## 2021-05-17 PROCEDURE — 83036 HEMOGLOBIN GLYCOSYLATED A1C: CPT

## 2021-05-17 PROCEDURE — 82043 UR ALBUMIN QUANTITATIVE: CPT

## 2021-05-17 PROCEDURE — 82570 ASSAY OF URINE CREATININE: CPT

## 2021-05-28 ENCOUNTER — OFFICE VISIT (OUTPATIENT)
Dept: FAMILY MEDICINE CLINIC | Age: 46
End: 2021-05-28
Payer: MEDICARE

## 2021-05-28 VITALS
TEMPERATURE: 97.4 F | HEART RATE: 104 BPM | BODY MASS INDEX: 20.43 KG/M2 | SYSTOLIC BLOOD PRESSURE: 116 MMHG | WEIGHT: 134.8 LBS | OXYGEN SATURATION: 99 % | RESPIRATION RATE: 18 BRPM | HEIGHT: 68 IN | DIASTOLIC BLOOD PRESSURE: 80 MMHG

## 2021-05-28 DIAGNOSIS — R63.4 UNEXPLAINED WEIGHT LOSS: ICD-10-CM

## 2021-05-28 DIAGNOSIS — F84.0 AUTISM: ICD-10-CM

## 2021-05-28 DIAGNOSIS — E11.9 CONTROLLED TYPE 2 DIABETES MELLITUS WITHOUT COMPLICATION, WITHOUT LONG-TERM CURRENT USE OF INSULIN (HCC): Primary | ICD-10-CM

## 2021-05-28 DIAGNOSIS — E78.2 MIXED HYPERLIPIDEMIA: ICD-10-CM

## 2021-05-28 PROCEDURE — 2022F DILAT RTA XM EVC RTNOPTHY: CPT | Performed by: FAMILY MEDICINE

## 2021-05-28 PROCEDURE — 3044F HG A1C LEVEL LT 7.0%: CPT | Performed by: FAMILY MEDICINE

## 2021-05-28 PROCEDURE — 1036F TOBACCO NON-USER: CPT | Performed by: FAMILY MEDICINE

## 2021-05-28 PROCEDURE — 99212 OFFICE O/P EST SF 10 MIN: CPT | Performed by: FAMILY MEDICINE

## 2021-05-28 PROCEDURE — G8420 CALC BMI NORM PARAMETERS: HCPCS | Performed by: FAMILY MEDICINE

## 2021-05-28 PROCEDURE — 99214 OFFICE O/P EST MOD 30 MIN: CPT | Performed by: FAMILY MEDICINE

## 2021-05-28 PROCEDURE — G8427 DOCREV CUR MEDS BY ELIG CLIN: HCPCS | Performed by: FAMILY MEDICINE

## 2021-05-28 RX ORDER — RISPERIDONE 1 MG/1
1 TABLET, FILM COATED ORAL 2 TIMES DAILY
Qty: 60 TABLET | Refills: 5 | Status: SHIPPED | OUTPATIENT
Start: 2021-05-28 | End: 2021-08-26 | Stop reason: SDUPTHER

## 2021-05-28 SDOH — ECONOMIC STABILITY: FOOD INSECURITY: WITHIN THE PAST 12 MONTHS, THE FOOD YOU BOUGHT JUST DIDN'T LAST AND YOU DIDN'T HAVE MONEY TO GET MORE.: NEVER TRUE

## 2021-05-28 SDOH — ECONOMIC STABILITY: FOOD INSECURITY: WITHIN THE PAST 12 MONTHS, YOU WORRIED THAT YOUR FOOD WOULD RUN OUT BEFORE YOU GOT MONEY TO BUY MORE.: NEVER TRUE

## 2021-05-28 ASSESSMENT — ENCOUNTER SYMPTOMS
DIARRHEA: 0
COUGH: 0
NAUSEA: 0
EYE DISCHARGE: 0
EYE REDNESS: 0
VOMITING: 0
SHORTNESS OF BREATH: 0
ABDOMINAL PAIN: 0
RHINORRHEA: 0
WHEEZING: 0
SORE THROAT: 0
CONSTIPATION: 0
TROUBLE SWALLOWING: 0
SINUS PRESSURE: 0

## 2021-05-28 ASSESSMENT — PATIENT HEALTH QUESTIONNAIRE - PHQ9
SUM OF ALL RESPONSES TO PHQ QUESTIONS 1-9: 0
1. LITTLE INTEREST OR PLEASURE IN DOING THINGS: 0

## 2021-05-28 NOTE — PROGRESS NOTES
14.4 %    Platelets 538 145 - 205 k/uL    MPV 9.9 8.1 - 13.5 fL    NRBC Automated 0.0 0.0 per 100 WBC    Differential Type NOT REPORTED     Seg Neutrophils 63 36 - 65 %    Lymphocytes 28 24 - 43 %    Monocytes 6 3 - 12 %    Eosinophils % 2 1 - 4 %    Basophils 1 0 - 2 %    Immature Granulocytes 0 0 %    Segs Absolute 3.16 1.50 - 8.10 k/uL    Absolute Lymph # 1.42 1.10 - 3.70 k/uL    Absolute Mono # 0.32 0.10 - 1.20 k/uL    Absolute Eos # 0.11 0.00 - 0.44 k/uL    Basophils Absolute 0.04 0.00 - 0.20 k/uL    Absolute Immature Granulocyte <0.03 0.00 - 0.30 k/uL    WBC Morphology NOT REPORTED     RBC Morphology NOT REPORTED     Platelet Estimate NOT REPORTED    Comprehensive Metabolic Panel   Result Value Ref Range    Glucose 99 70 - 99 mg/dL    BUN 15 6 - 20 mg/dL    CREATININE 0.73 0.70 - 1.20 mg/dL    Bun/Cre Ratio 21 (H) 9 - 20    Calcium 9.9 8.6 - 10.4 mg/dL    Sodium 144 135 - 144 mmol/L    Potassium 4.2 3.7 - 5.3 mmol/L    Chloride 106 98 - 107 mmol/L    CO2 29 20 - 31 mmol/L    Anion Gap 9 9 - 17 mmol/L    Alkaline Phosphatase 37 (L) 40 - 129 U/L    ALT 13 5 - 41 U/L    AST 18 <40 U/L    Total Bilirubin 0.60 0.3 - 1.2 mg/dL    Total Protein 7.2 6.4 - 8.3 g/dL    Albumin 4.5 3.5 - 5.2 g/dL    Albumin/Globulin Ratio 1.7 1.0 - 2.5    GFR Non-African American >60 >60 mL/min    GFR African American >60 >60 mL/min    GFR Comment          GFR Staging NOT REPORTED    Hemoglobin A1C   Result Value Ref Range    Hemoglobin A1C 5.3 4.0 - 6.0 %    Estimated Avg Glucose 105 mg/dL   Lipid Panel   Result Value Ref Range    Cholesterol 125 <200 mg/dL    HDL 62 >40 mg/dL    LDL Cholesterol 52 0 - 130 mg/dL    Chol/HDL Ratio 2.0 <5    Triglycerides 56 <150 mg/dL    VLDL NOT REPORTED 1 - 30 mg/dL   Microalbumin, Ur   Result Value Ref Range    Microalb, Ur <12 <21 mg/L    Creatinine, Ur 163.6 39.0 - 259.0 mg/dL    Microalb/Crt.  Ratio CANNOT BE CALCULATED <17 mcg/mg creat      Other review of systems are as noted below.    Preventative measures are reviewed today. See health maintenance section for complete preventative plan of care. Did review patient's med list, allergies, social history, fam history, pmhx and pshx today as noted in the record. Review of Systems   Constitutional: Positive for unexpected weight change. Negative for chills, fatigue and fever. HENT: Negative for congestion, ear pain, postnasal drip, rhinorrhea, sinus pressure, sore throat and trouble swallowing. Eyes: Negative for discharge and redness. Respiratory: Negative for cough, shortness of breath and wheezing. Cardiovascular: Negative for chest pain. Gastrointestinal: Negative for abdominal pain, constipation, diarrhea, nausea and vomiting. Genitourinary: Negative for dysuria, flank pain, frequency and urgency. Musculoskeletal: Negative for arthralgias, myalgias and neck pain. Skin: Negative for rash and wound. Allergic/Immunologic: Negative for environmental allergies. Neurological: Negative for dizziness, weakness, light-headedness and headaches. Hematological: Negative for adenopathy. Psychiatric/Behavioral: The patient is hyperactive (constantly pacing). Prior to Visit Medications    Medication Sig Taking?  Authorizing Provider   glimepiride (AMARYL) 2 MG tablet TAKE 1 TABLET BY MOUTH EVERY MORNING  Gwendlyn Lab, DO   risperiDONE (RISPERDAL) 1 MG tablet TAKE 1 TABLET BY MOUTH DAILY  Gwendlyn Lab, DO   metFORMIN (GLUCOPHAGE-XR) 500 MG extended release tablet TAKE 2 TABLETS BY MOUTH EVERY NIGHT  Gwendlyn Lab, DO   atorvastatin (LIPITOR) 40 MG tablet TAKE 1 TABLET BY MOUTH DAILY  Gwendlyn Lab, DO   sodium chloride (ALTAMIST SPRAY) 0.65 % nasal spray Use 2 sprays in each nostril three times daily prn dryness  Rachele Diaz, DO   hydrocortisone (ANUSOL-HC) 2.5 % CREA rectal cream Apply to perirectal area as needed for itching or irritation  Gwendlyn Lab, DO   GLUCOCARD VITAL TEST Judgment: Judgment normal.           ASSESSMENT/PLAN:  Encounter Diagnoses   Name Primary?  Unexplained weight loss     Controlled type 2 diabetes mellitus without complication, without long-term current use of insulin (HCC) Yes    Mixed hyperlipidemia     Autism      Orders Placed This Encounter   Medications    risperiDONE (RISPERDAL) 1 MG tablet     Sig: Take 1 tablet by mouth 2 times daily     Dispense:  60 tablet     Refill:  5     Orders Placed This Encounter   Procedures    CBC Auto Differential     Standing Status:   Future     Standing Expiration Date:   5/28/2022    Comprehensive Metabolic Panel     Standing Status:   Future     Standing Expiration Date:   5/28/2022    Hemoglobin A1C     Standing Status:   Future     Standing Expiration Date:   5/28/2022    Lipid Panel     Standing Status:   Future     Standing Expiration Date:   5/28/2022     Order Specific Question:   Is Patient Fasting?/# of Hours     Answer:   12 hours   Merly Laura MD, General Surgery, [place]     Referral Priority:   Routine     Referral Type:   Eval and Treat     Referral Reason:   Specialty Services Required     Referred to Provider:   Kal Parks MD     Requested Specialty:   General Surgery     Number of Visits Requested:   1     Patient has had a nearly 70 pound weight loss in the last year. Initially felt to be related to better eating habits but now despite increasing his overall dietary intake he continues to lose weight. Will refer to the surgeon for consideration for colonoscopy due to small caliber stools and weight loss. Will increase his Risperdal to twice daily dosing. He is very unsettled and constantly moving and restless. This may help with some of the overactivity. Patient is to continue on the rest of his current medical therapy. No additional changes are made at this time.     Patient is to return to my office in 1 month for med recheck or sooner if any further problems or symptoms arise. (Please note that portions of this note were completed with a voice recognition program. Efforts were made to edit the dictations but occasionally words are mis-transcribed.)        No follow-ups on file. An electronic signature was used to authenticate this note.     --Nader Shook, DO on 5/28/2021 at 7:44 AM

## 2021-06-03 ENCOUNTER — INITIAL CONSULT (OUTPATIENT)
Dept: SURGERY | Age: 46
End: 2021-06-03
Payer: MEDICARE

## 2021-06-03 VITALS
DIASTOLIC BLOOD PRESSURE: 86 MMHG | RESPIRATION RATE: 16 BRPM | HEART RATE: 64 BPM | SYSTOLIC BLOOD PRESSURE: 126 MMHG | BODY MASS INDEX: 20.46 KG/M2 | HEIGHT: 68 IN | WEIGHT: 135 LBS

## 2021-06-03 DIAGNOSIS — R19.5 CHANGE IN STOOL CALIBER: ICD-10-CM

## 2021-06-03 DIAGNOSIS — R63.4 UNINTENDED WEIGHT LOSS: Primary | ICD-10-CM

## 2021-06-03 PROCEDURE — 1036F TOBACCO NON-USER: CPT | Performed by: SURGERY

## 2021-06-03 PROCEDURE — G8427 DOCREV CUR MEDS BY ELIG CLIN: HCPCS | Performed by: SURGERY

## 2021-06-03 PROCEDURE — G8420 CALC BMI NORM PARAMETERS: HCPCS | Performed by: SURGERY

## 2021-06-03 PROCEDURE — 99215 OFFICE O/P EST HI 40 MIN: CPT

## 2021-06-03 PROCEDURE — 99203 OFFICE O/P NEW LOW 30 MIN: CPT | Performed by: SURGERY

## 2021-06-03 RX ORDER — BISACODYL 5 MG
5 TABLET, DELAYED RELEASE (ENTERIC COATED) ORAL DAILY PRN
Qty: 4 TABLET | Refills: 0 | Status: SHIPPED | OUTPATIENT
Start: 2021-06-03 | End: 2021-06-04

## 2021-06-03 RX ORDER — POLYETHYLENE GLYCOL 3350 17 G/17G
POWDER, FOR SOLUTION ORAL
Qty: 255 G | Refills: 0 | Status: SHIPPED | OUTPATIENT
Start: 2021-06-03

## 2021-06-03 NOTE — PROGRESS NOTES
Subjective   Kimberly Granados is a 50 y.o. male who presents today for evaluation of unintended weight loss and change in stool caliber. Patient was recently seen by his PCP with the symptoms and was referred to general surgery. The patient is severely autistic and is here with a caregiver from the group home that he lives in. It seems that he came to this home approximately a year ago and at that point weighed between 220 and 230 pounds. Per caregiver prior to this he was eating a diet that she states consisted mostly of canned spaghetti O's and soda. Since he has been at this group home she states that he has been eating normally and has been having well balanced healthy meals. However despite eating full meals as well as snacks at the home he has begun to have weight loss and she states that over the past year has lost approximately 75 pounds. During this time over the last at least several months she states that they have also begun to notice that his stool caliber has decreased in size to now being fairly thin stools no larger than his pinky finger. She is unsure how long this has been going on as she states that this was not something that was being monitored closely prior to a few months but back. Does not seem that he has had any change in stool color and there is been no blood. Unaware of what the patient's family history is in relation to colon issues or cancers. Patient has not complained of any abdominal pain. Past Medical History:   Diagnosis Date    Autism     Does not communicate    Obesity     Skin lesions     Both arms with scarring secondary to biting. This is a behavior/emotional problem. History reviewed. No pertinent surgical history.     Current Outpatient Medications   Medication Sig Dispense Refill    risperiDONE (RISPERDAL) 1 MG tablet Take 1 tablet by mouth 2 times daily 60 tablet 5    glimepiride (AMARYL) 2 MG tablet TAKE 1 TABLET BY MOUTH EVERY MORNING 30 tablet 5    metFORMIN (GLUCOPHAGE-XR) 500 MG extended release tablet TAKE 2 TABLETS BY MOUTH EVERY NIGHT 60 tablet 5    atorvastatin (LIPITOR) 40 MG tablet TAKE 1 TABLET BY MOUTH DAILY 30 tablet 5    sodium chloride (ALTAMIST SPRAY) 0.65 % nasal spray Use 2 sprays in each nostril three times daily prn dryness 1 Bottle 3    hydrocortisone (ANUSOL-HC) 2.5 % CREA rectal cream Apply to perirectal area as needed for itching or irritation 28 g 2    GLUCOCARD VITAL TEST strip TEST ONCE DAILY 100 strip 1     No current facility-administered medications for this visit. No Known Allergies    Family History   Problem Relation Age of Onset    High Cholesterol Mother     High Blood Pressure Mother     Diabetes Mother     High Cholesterol Father        Social History     Socioeconomic History    Marital status:      Spouse name: Not on file    Number of children: Not on file    Years of education: Not on file    Highest education level: Not on file   Occupational History    Not on file   Tobacco Use    Smoking status: Never Smoker    Smokeless tobacco: Never Used   Substance and Sexual Activity    Alcohol use: No    Drug use: No    Sexual activity: Not on file   Other Topics Concern    Not on file   Social History Narrative    Not on file     Social Determinants of Health     Financial Resource Strain: Low Risk     Difficulty of Paying Living Expenses: Not hard at all   Food Insecurity: No Food Insecurity    Worried About Running Out of Food in the Last Year: Never true    Marko of Food in the Last Year: Never true   Transportation Needs: No Transportation Needs    Lack of Transportation (Medical): No    Lack of Transportation (Non-Medical):  No   Physical Activity:     Days of Exercise per Week:     Minutes of Exercise per Session:    Stress:     Feeling of Stress :    Social Connections:     Frequency of Communication with Friends and Family:     Frequency of Social Gatherings with Friends and Family:     Attends Faith Services:     Active Member of Clubs or Organizations:     Attends Club or Organization Meetings:     Marital Status:    Intimate Partner Violence:     Fear of Current or Ex-Partner:     Emotionally Abused:     Physically Abused:     Sexually Abused:        ROS:   Review of Systems - History obtained from caregiver from group home      Objective   Vitals:    06/03/21 1446   BP: 126/86   Pulse: 64   Resp: 16     General:in no apparent distress  Eyes: No gross abnormalities. Ears, Nose, Throat: hearing grossly normal bilaterally  Neck: neck supple and non tender without mass  Lungs: clear to auscultation without wheezes or rales   Heart: S1S2, no mumurs, RRR  Abdomen: Soft, nondistended, no parent tenderness to palpation, bowel sounds present. No scars to suggest any prior abdominal surgeries. Extremity: negative  Neuro: CN II-XII grossly intact      Assessment     3  49-year-old male with 1 year history of unintended weight loss now with decrease in stool caliber as well. Plan     1. Based on the patient's weight loss in stool caliber certainly this does raise concern for some sort of colonic mass. I think we should probably proceed with a colonoscopy to rule this out. We will obtain consent from the patient's power of . The caregiver from the group home is requesting overnight observation for bowel prep prior to the colonoscopy due to concern for difficulty with completing the prep at the facility. We will try to facilitate this and plan for this (overnight observation. I did discuss with the caregiver that it may be that the insurance provider does not approve this but we will do everything in our power to try and make this happen. We will proceed with colonoscopy at her earliest available in convenient date for the patient and caregiver. Will obtain consent from patient's POA prior to the time of the procedure.     Electronically signed by Billie Nazario Angela Tripp, DO on 6/3/2021 at 3:20 PM      (Please note that portions of this note were completed with a voice recognition program.  Efforts were made to edit the dictations but occasionally words are mis-transcribed.)

## 2021-06-16 ENCOUNTER — TELEPHONE (OUTPATIENT)
Dept: SURGERY | Age: 46
End: 2021-06-16

## 2021-06-16 ENCOUNTER — ANESTHESIA EVENT (OUTPATIENT)
Dept: OPERATING ROOM | Age: 46
End: 2021-06-16
Payer: MEDICARE

## 2021-06-16 NOTE — TELEPHONE ENCOUNTER
Boris Guardian notified of patients procedure being canceled due to the fact we can't reach his . Prep had not been started and will be saved for next time. Please call her back at [phone] to reschedule.

## 2021-06-17 ENCOUNTER — ANESTHESIA (OUTPATIENT)
Dept: OPERATING ROOM | Age: 46
End: 2021-06-17
Payer: MEDICARE

## 2021-07-08 ENCOUNTER — HOSPITAL ENCOUNTER (OUTPATIENT)
Age: 46
Setting detail: OUTPATIENT SURGERY
Discharge: HOME | End: 2021-07-08
Attending: SURGERY | Admitting: SURGERY
Payer: MEDICARE

## 2021-07-08 VITALS
HEART RATE: 87 BPM | OXYGEN SATURATION: 100 % | RESPIRATION RATE: 16 BRPM | HEIGHT: 68 IN | BODY MASS INDEX: 19.52 KG/M2 | SYSTOLIC BLOOD PRESSURE: 115 MMHG | TEMPERATURE: 97.6 F | WEIGHT: 128.8 LBS | DIASTOLIC BLOOD PRESSURE: 78 MMHG

## 2021-07-08 VITALS — DIASTOLIC BLOOD PRESSURE: 63 MMHG | OXYGEN SATURATION: 99 % | SYSTOLIC BLOOD PRESSURE: 100 MMHG | TEMPERATURE: 98.4 F

## 2021-07-08 LAB — GLUCOSE BLD-MCNC: 72 MG/DL (ref 75–110)

## 2021-07-08 PROCEDURE — 7100000011 HC PHASE II RECOVERY - ADDTL 15 MIN: Performed by: SURGERY

## 2021-07-08 PROCEDURE — 45378 DIAGNOSTIC COLONOSCOPY: CPT | Performed by: SURGERY

## 2021-07-08 PROCEDURE — 82947 ASSAY GLUCOSE BLOOD QUANT: CPT

## 2021-07-08 PROCEDURE — 7100000010 HC PHASE II RECOVERY - FIRST 15 MIN: Performed by: SURGERY

## 2021-07-08 PROCEDURE — 3609027000 HC COLONOSCOPY: Performed by: SURGERY

## 2021-07-08 PROCEDURE — 2580000003 HC RX 258: Performed by: SURGERY

## 2021-07-08 PROCEDURE — 3700000000 HC ANESTHESIA ATTENDED CARE: Performed by: SURGERY

## 2021-07-08 PROCEDURE — 6360000002 HC RX W HCPCS: Performed by: NURSE ANESTHETIST, CERTIFIED REGISTERED

## 2021-07-08 PROCEDURE — 3700000001 HC ADD 15 MINUTES (ANESTHESIA): Performed by: SURGERY

## 2021-07-08 PROCEDURE — 2709999900 HC NON-CHARGEABLE SUPPLY: Performed by: SURGERY

## 2021-07-08 RX ORDER — SODIUM CHLORIDE, SODIUM LACTATE, POTASSIUM CHLORIDE, CALCIUM CHLORIDE 600; 310; 30; 20 MG/100ML; MG/100ML; MG/100ML; MG/100ML
INJECTION, SOLUTION INTRAVENOUS CONTINUOUS
Status: DISCONTINUED | OUTPATIENT
Start: 2021-07-08 | End: 2021-07-08 | Stop reason: HOSPADM

## 2021-07-08 RX ORDER — SODIUM CHLORIDE 0.9 % (FLUSH) 0.9 %
10 SYRINGE (ML) INJECTION PRN
Status: DISCONTINUED | OUTPATIENT
Start: 2021-07-08 | End: 2021-07-08 | Stop reason: HOSPADM

## 2021-07-08 RX ORDER — SODIUM CHLORIDE 9 MG/ML
25 INJECTION, SOLUTION INTRAVENOUS PRN
Status: DISCONTINUED | OUTPATIENT
Start: 2021-07-08 | End: 2021-07-08 | Stop reason: HOSPADM

## 2021-07-08 RX ORDER — PROPOFOL 10 MG/ML
INJECTION, EMULSION INTRAVENOUS PRN
Status: DISCONTINUED | OUTPATIENT
Start: 2021-07-08 | End: 2021-07-08 | Stop reason: SDUPTHER

## 2021-07-08 RX ORDER — SODIUM CHLORIDE 0.9 % (FLUSH) 0.9 %
10 SYRINGE (ML) INJECTION EVERY 12 HOURS SCHEDULED
Status: DISCONTINUED | OUTPATIENT
Start: 2021-07-08 | End: 2021-07-08 | Stop reason: HOSPADM

## 2021-07-08 RX ADMIN — PROPOFOL 500 MG: 10 INJECTION, EMULSION INTRAVENOUS at 07:26

## 2021-07-08 RX ADMIN — SODIUM CHLORIDE, POTASSIUM CHLORIDE, SODIUM LACTATE AND CALCIUM CHLORIDE: 600; 310; 30; 20 INJECTION, SOLUTION INTRAVENOUS at 07:06

## 2021-07-08 ASSESSMENT — PAIN SCALES - GENERAL
PAINLEVEL_OUTOF10: 0
PAINLEVEL_OUTOF10: 0

## 2021-07-08 ASSESSMENT — PAIN - FUNCTIONAL ASSESSMENT: PAIN_FUNCTIONAL_ASSESSMENT: 0-10

## 2021-07-08 NOTE — OP NOTE
[address]                 [address] [place] [address]                                OPERATIVE REPORT    PATIENT NAME: Brennan Daly                        :        1975  MED REC NO:   [id number]                             ROOM:  ACCOUNT NO:   [de-identified]                           ADMIT DATE: 2021  PROVIDER:     Christopher Grant    DATE OF PROCEDURE:  2021    SURGEON:  Dr. Christopher Grant. ASSISTANT:  None. PREOPERATIVE DIAGNOSES:  1. Change in bowel movements. 2.  Unintended weight loss. POSTOPERATIVE DIAGNOSES:  1. Change in bowel movements. 2.  Unintended weight loss. PROCEDURE:  Colonoscopy. ANESTHESIA:  MAC.    ESTIMATED BLOOD LOSS:  Minimal.    FLUIDS:  Per anesthesia record. COMPLICATIONS:  None. SPECIMENS:  None. INDICATION FOR PROCEDURE:  The patient is a 49-year-old gentleman who  presented to my office with the above diagnoses. After evaluation,  decision was made to proceed with colonoscopy for further evaluation. Prior to the time of the procedure, risks, benefits, and alternative  were explained and consent was obtained. DESCRIPTION OF PROCEDURE:  The patient was brought to endoscopy suite,  kept on preoperative gurney and placed in the left lateral decubitus  position. Monitoring devices were placed. MAC anesthesia was induced. After induction of anesthesia, time-out was performed and correct  patient and procedure were verified. Digital rectal exam was performed  which showed no significant abnormality. The Olympus video endoscope  was lubricated, inserted into the patient's rectum which was gently  insufflated with air. Scope was then slowly advanced through the colon  under visualization to the level of the cecum which was identified by  appendiceal orifice and ileocecal valve. Terminal ileum was intubated  and inspection of mucosa showed no abnormality.   Scope was withdrawn  into the cecum and then slowly withdrawn through the colon with  withdrawal time being greater than 7 minutes. During this time, colonic  mucosa was carefully inspected. Colon appeared healthy and there were  no polyps or other lesions noted. Once at the rectum, a retroflex view  was obtained which showed no distal rectal abnormalities. The scope was  straightened and removed and procedure was concluded. At conclusion of  the procedure, the patient was in stable condition and was taken to the  PACU for recovery. PLAN:  No further workup will be required from a General Surgery  standpoint. We will defer back to PCP for the patient's weight loss. The patient will need repeat colonoscopy in 10 years for screening  purposes.         Merlinda Singh    D: 07/08/2021 7:51:16       T: 07/08/2021 7:59:08     [email or web address]  Job#: [id number]     Doc#: [id number]    CC:  Primary Care

## 2021-07-08 NOTE — H&P
Socorro Michel is a 50 y.o. male who presents today for evaluation of unintended weight loss and change in stool caliber. Patient was recently seen by his PCP with the symptoms and was referred to general surgery. The patient is severely autistic and is here with a caregiver from the group home that he lives in. It seems that he came to this home approximately a year ago and at that point weighed between 220 and 230 pounds. Per caregiver prior to this he was eating a diet that she states consisted mostly of canned spaghetti O's and soda. Since he has been at this group home she states that he has been eating normally and has been having well balanced healthy meals. However despite eating full meals as well as snacks at the home he has begun to have weight loss and she states that over the past year has lost approximately 75 pounds. During this time over the last at least several months she states that they have also begun to notice that his stool caliber has decreased in size to now being fairly thin stools no larger than his pinky finger. She is unsure how long this has been going on as she states that this was not something that was being monitored closely prior to a few months but back. Does not seem that he has had any change in stool color and there is been no blood. Unaware of what the patient's family history is in relation to colon issues or cancers. Patient has not complained of any abdominal pain.     Past Medical History        Past Medical History:   Diagnosis Date    Autism       Does not communicate    Obesity      Skin lesions       Both arms with scarring secondary to biting. This is a behavior/emotional problem.            Past Surgical History   History reviewed.  No pertinent surgical history.        Current Facility-Administered Medications          Current Outpatient Medications   Medication Sig Dispense Refill    risperiDONE (RISPERDAL) 1 MG tablet Take 1 tablet by mouth 2 times daily 60 tablet 5    glimepiride (AMARYL) 2 MG tablet TAKE 1 TABLET BY MOUTH EVERY MORNING 30 tablet 5    metFORMIN (GLUCOPHAGE-XR) 500 MG extended release tablet TAKE 2 TABLETS BY MOUTH EVERY NIGHT 60 tablet 5    atorvastatin (LIPITOR) 40 MG tablet TAKE 1 TABLET BY MOUTH DAILY 30 tablet 5    sodium chloride (ALTAMIST SPRAY) 0.65 % nasal spray Use 2 sprays in each nostril three times daily prn dryness 1 Bottle 3    hydrocortisone (ANUSOL-HC) 2.5 % CREA rectal cream Apply to perirectal area as needed for itching or irritation 28 g 2    GLUCOCARD VITAL TEST strip TEST ONCE DAILY 100 strip 1      No current facility-administered medications for this visit.            No Known Allergies     Family History         Family History   Problem Relation Age of Onset    High Cholesterol Mother      High Blood Pressure Mother      Diabetes Mother      High Cholesterol Father              Social History               Socioeconomic History    Marital status:        Spouse name: Not on file    Number of children: Not on file    Years of education: Not on file    Highest education level: Not on file   Occupational History    Not on file   Tobacco Use    Smoking status: Never Smoker    Smokeless tobacco: Never Used   Substance and Sexual Activity    Alcohol use: No    Drug use: No    Sexual activity: Not on file   Other Topics Concern    Not on file   Social History Narrative    Not on file      Social Determinants of Health      Financial Resource Strain: Low Risk     Difficulty of Paying Living Expenses: Not hard at all   Food Insecurity: No Food Insecurity    Worried About Running Out of Food in the Last Year: Never true    Marko of Food in the Last Year: Never true   Transportation Needs: No Transportation Needs    Lack of Transportation (Medical): No    Lack of Transportation (Non-Medical):  No   Physical Activity:     Days of Exercise per Week:     Minutes of Exercise per Session: Stress:     Feeling of Stress :    Social Connections:     Frequency of Communication with Friends and Family:     Frequency of Social Gatherings with Friends and Family:     Attends Sabianist Services:     Active Member of Clubs or Organizations:     Attends Club or Organization Meetings:     Marital Status:    Intimate Partner Violence:     Fear of Current or Ex-Partner:     Emotionally Abused:     Physically Abused:     Sexually Abused:             ROS:   Review of Systems - History obtained from caregiver from group home        Objective   Vitals:     06/03/21 1446   BP: 126/86   Pulse: 64   Resp: 16      General:in no apparent distress  Eyes: No gross abnormalities. Ears, Nose, Throat: hearing grossly normal bilaterally  Neck: neck supple and non tender without mass  Lungs: clear to auscultation without wheezes or rales   Heart: S1S2, no mumurs, RRR  Abdomen: Soft, nondistended, no parent tenderness to palpation, bowel sounds present. No scars to suggest any prior abdominal surgeries. Extremity: negative  Neuro: CN II-XII grossly intact        Assessment      3  49-year-old male with 1 year history of unintended weight loss now with decrease in stool caliber as well.        Plan      1. Based on the patient's weight loss in stool caliber certainly this does raise concern for some sort of colonic mass. I think we should probably proceed with a colonoscopy to rule this out. We will obtain consent from the patient's power of . The caregiver from the group home is requesting overnight observation for bowel prep prior to the colonoscopy due to concern for difficulty with completing the prep at the facility. We will try to facilitate this and plan for this (overnight observation. I did discuss with the caregiver that it may be that the insurance provider does not approve this but we will do everything in our power to try and make this happen.   We will proceed with colonoscopy at her earliest available in convenient date for the patient and caregiver.   Will obtain consent from patient's POA prior to the time of the procedure.     Electronically signed by Leidy Monzon DO on 6/3/2021 at 3:20 PM        (Please note that portions of this note were completed with a voice recognition program.  Efforts were made to edit the dictations but occasionally words are mis-transcribed.)    The patient was interviewed and examined and there have been no changes since the above History and Physical.    Electronically signed by Leidy Monzon DO on 7/7/2021 at 8:52 PM
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
## 2021-07-08 NOTE — ANESTHESIA POSTPROCEDURE EVALUATION
Department of Anesthesiology  Postprocedure Note    Patient: Yulissa Arellano  MRN: [id number]  YOB: 1975  Date of evaluation: 7/8/2021  Time:  7:51 AM     Procedure Summary     Date: 07/08/21 Room / Location: [address]    Anesthesia Start: 0720 Anesthesia Stop: 7946    Procedure: COLONOSCOPY (N/A ) Diagnosis: (weight loss, change in stool caliber)    Surgeons: Kaela Cho DO Responsible Provider: MARGARITO Alfonso CRNA    Anesthesia Type: general, TIVA ASA Status: 2          Anesthesia Type: general, TIVA    Aron Phase I:      Aron Phase II: Aron Score: 9    Last vitals: Reviewed and per EMR flowsheets.        Anesthesia Post Evaluation    Patient location during evaluation: PACU  Patient participation: complete - patient participated  Level of consciousness: awake  Pain score: 0  Airway patency: patent  Nausea & Vomiting: no nausea and no vomiting  Complications: no  Cardiovascular status: blood pressure returned to baseline and hemodynamically stable  Respiratory status: acceptable, spontaneous ventilation and room air  Hydration status: euvolemic

## 2021-08-26 ENCOUNTER — OFFICE VISIT (OUTPATIENT)
Dept: FAMILY MEDICINE CLINIC | Age: 46
End: 2021-08-26
Payer: MEDICARE

## 2021-08-26 ENCOUNTER — HOSPITAL ENCOUNTER (OUTPATIENT)
Dept: LAB | Age: 46
Discharge: HOME | End: 2021-08-26
Payer: MEDICARE

## 2021-08-26 VITALS
BODY MASS INDEX: 19.52 KG/M2 | RESPIRATION RATE: 12 BRPM | OXYGEN SATURATION: 98 % | HEIGHT: 68 IN | SYSTOLIC BLOOD PRESSURE: 108 MMHG | DIASTOLIC BLOOD PRESSURE: 60 MMHG | HEART RATE: 122 BPM | WEIGHT: 128.8 LBS

## 2021-08-26 DIAGNOSIS — R35.0 URINARY FREQUENCY: ICD-10-CM

## 2021-08-26 DIAGNOSIS — R63.4 UNEXPLAINED WEIGHT LOSS: Primary | ICD-10-CM

## 2021-08-26 DIAGNOSIS — F84.0 AUTISM: ICD-10-CM

## 2021-08-26 DIAGNOSIS — E11.9 CONTROLLED TYPE 2 DIABETES MELLITUS WITHOUT COMPLICATION, WITHOUT LONG-TERM CURRENT USE OF INSULIN (HCC): ICD-10-CM

## 2021-08-26 LAB
-: ABNORMAL
AMORPHOUS: ABNORMAL
BACTERIA: ABNORMAL
BILIRUBIN URINE: NEGATIVE
CASTS UA: ABNORMAL /LPF (ref 0–2)
COLOR: ABNORMAL
COMMENT UA: ABNORMAL
CRYSTALS, UA: ABNORMAL /HPF
EPITHELIAL CELLS UA: ABNORMAL /HPF (ref 0–5)
GLUCOSE URINE: NEGATIVE
KETONES, URINE: NEGATIVE
LEUKOCYTE ESTERASE, URINE: NEGATIVE
MUCUS: ABNORMAL
NITRITE, URINE: NEGATIVE
OTHER OBSERVATIONS UA: ABNORMAL
PH UA: 8 (ref 5–6)
PROTEIN UA: NEGATIVE
RBC UA: ABNORMAL /HPF (ref 0–4)
RENAL EPITHELIAL, UA: ABNORMAL /HPF
SPECIFIC GRAVITY UA: 1.01 (ref 1.01–1.02)
TRICHOMONAS: ABNORMAL
TURBIDITY: ABNORMAL
URINE HGB: NEGATIVE
UROBILINOGEN, URINE: NORMAL
WBC UA: ABNORMAL /HPF (ref 0–4)
YEAST: ABNORMAL

## 2021-08-26 PROCEDURE — G8420 CALC BMI NORM PARAMETERS: HCPCS | Performed by: FAMILY MEDICINE

## 2021-08-26 PROCEDURE — 2022F DILAT RTA XM EVC RTNOPTHY: CPT | Performed by: FAMILY MEDICINE

## 2021-08-26 PROCEDURE — 87086 URINE CULTURE/COLONY COUNT: CPT

## 2021-08-26 PROCEDURE — 1036F TOBACCO NON-USER: CPT | Performed by: FAMILY MEDICINE

## 2021-08-26 PROCEDURE — G8427 DOCREV CUR MEDS BY ELIG CLIN: HCPCS | Performed by: FAMILY MEDICINE

## 2021-08-26 PROCEDURE — 3044F HG A1C LEVEL LT 7.0%: CPT | Performed by: FAMILY MEDICINE

## 2021-08-26 PROCEDURE — 81001 URINALYSIS AUTO W/SCOPE: CPT

## 2021-08-26 PROCEDURE — 99212 OFFICE O/P EST SF 10 MIN: CPT | Performed by: FAMILY MEDICINE

## 2021-08-26 PROCEDURE — 99214 OFFICE O/P EST MOD 30 MIN: CPT | Performed by: FAMILY MEDICINE

## 2021-08-26 RX ORDER — OXYBUTYNIN CHLORIDE 10 MG/1
10 TABLET, EXTENDED RELEASE ORAL DAILY
Qty: 90 TABLET | Refills: 1 | Status: SHIPPED | OUTPATIENT
Start: 2021-08-26 | End: 2022-02-16

## 2021-08-26 RX ORDER — RISPERIDONE 1 MG/1
TABLET, FILM COATED ORAL
Qty: 90 TABLET | Refills: 2 | Status: SHIPPED | OUTPATIENT
Start: 2021-08-26 | End: 2021-11-19

## 2021-08-26 ASSESSMENT — ENCOUNTER SYMPTOMS
ABDOMINAL PAIN: 0
DIARRHEA: 0
SORE THROAT: 0
EYE DISCHARGE: 0
COUGH: 0
WHEEZING: 0
NAUSEA: 0
SINUS PRESSURE: 0
SHORTNESS OF BREATH: 0
RHINORRHEA: 0
CONSTIPATION: 0
EYE REDNESS: 0
VOMITING: 0
TROUBLE SWALLOWING: 0

## 2021-08-26 ASSESSMENT — PATIENT HEALTH QUESTIONNAIRE - PHQ9
SUM OF ALL RESPONSES TO PHQ QUESTIONS 1-9: 0
2. FEELING DOWN, DEPRESSED OR HOPELESS: 0
1. LITTLE INTEREST OR PLEASURE IN DOING THINGS: 0
SUM OF ALL RESPONSES TO PHQ9 QUESTIONS 1 & 2: 0
SUM OF ALL RESPONSES TO PHQ QUESTIONS 1-9: 0
SUM OF ALL RESPONSES TO PHQ QUESTIONS 1-9: 0

## 2021-08-26 NOTE — PROGRESS NOTES
2021     Marcy Michel (:  1975) is a 48 y.o. male, here for evaluation of the following medical concerns:    HPI  Comes in today with his caregiver for follow-up evaluation of unexplained weight loss and to discuss urinary frequency. Patient did have a colonoscopy which did not show any evidence of any colon abnormality. Patient still has lost weight. Is down 6 pounds. Eats a very consistent amount of food and eats a fairly decent amount of food. Has not had any change in his bowels. Does not seem to have any decreased appetite issues. Does not seem to have any nausea or vomiting issues. Does not have any abdominal pain. Has been having increased urinary frequency that is been going on for quite some time. Does not describe any burning with urination. No flank pain. No fever or chills. We will check a urinalysis but if this is negative for acute infection we will plan to add Ditropan to see if this will help with some of the urinary frequency. Patient does have ongoing issues with continued pacing and going up and down the stairs at home. Since we increased his Risperdal to twice a day he may be had slight improvement in the amount of activity he does but still is very overactive during the day. Did suggest increasing the Risperdal further. We did talk about stopping the Metformin and glimepiride as his blood sugars are running relatively low and his last hemoglobin A1c was 5.3. Want to make sure that this medication is not contributing to his ongoing weight loss. Patient otherwise has no other acute medical concerns. Did review patient's med list, allergies, social history,pmhx and pshx today as noted in the record. Review of Systems   Constitutional: Negative for chills, fatigue and fever. HENT: Negative for congestion, ear pain, postnasal drip, rhinorrhea, sinus pressure, sore throat and trouble swallowing. Eyes: Negative for discharge and redness.    Respiratory: Negative for cough, shortness of breath and wheezing. Cardiovascular: Negative for chest pain. Gastrointestinal: Negative for abdominal pain, constipation, diarrhea, nausea and vomiting. Genitourinary: Positive for frequency. Negative for dysuria, flank pain and urgency. Musculoskeletal: Negative for arthralgias, myalgias and neck pain. Skin: Negative for rash and wound. Allergic/Immunologic: Negative for environmental allergies. Neurological: Negative for dizziness, weakness, light-headedness and headaches. Hematological: Negative for adenopathy. Psychiatric/Behavioral: The patient is hyperactive. Prior to Visit Medications    Medication Sig Taking? Authorizing Provider   polyethylene glycol (GLYCOLAX) 17 GM/SCOOP powder Use per bowel prep  Alphonza Jada, DO   risperiDONE (RISPERDAL) 1 MG tablet Take 1 tablet by mouth 2 times daily  Rachele Diaz DO   glimepiride (AMARYL) 2 MG tablet TAKE 1 TABLET BY MOUTH EVERY MORNING  Cheryl Dean DO   metFORMIN (GLUCOPHAGE-XR) 500 MG extended release tablet TAKE 2 TABLETS BY MOUTH EVERY NIGHT  Cheryl Dean DO   atorvastatin (LIPITOR) 40 MG tablet TAKE 1 TABLET BY MOUTH DAILY  Cheryl Dean DO   sodium chloride (ALTAMIST SPRAY) 0.65 % nasal spray Use 2 sprays in each nostril three times daily prn dryness  Rachele Diaz DO   hydrocortisone (ANUSOL-HC) 2.5 % CREA rectal cream Apply to perirectal area as needed for itching or irritation  Cheryl Dean DO   GLUCOCARD VITAL TEST strip TEST ONCE DAILY  Cheryl Dean DO        Social History     Tobacco Use    Smoking status: Never Smoker    Smokeless tobacco: Never Used   Substance Use Topics    Alcohol use: No        There were no vitals filed for this visit. Estimated body mass index is 19.58 kg/m² as calculated from the following:    Height as of 7/8/21: 5' 8\" (1.727 m). Weight as of 7/8/21: 128 lb 12.8 oz (58.4 kg).     Physical Exam  Vitals and nursing note

## 2021-08-27 LAB
CULTURE: NO GROWTH
Lab: NORMAL
SPECIMEN DESCRIPTION: NORMAL

## 2021-09-16 ENCOUNTER — HOSPITAL ENCOUNTER (OUTPATIENT)
Dept: LAB | Age: 46
Discharge: HOME | End: 2021-09-16
Payer: MEDICARE

## 2021-09-16 DIAGNOSIS — E11.9 CONTROLLED TYPE 2 DIABETES MELLITUS WITHOUT COMPLICATION, WITHOUT LONG-TERM CURRENT USE OF INSULIN (HCC): ICD-10-CM

## 2021-09-16 DIAGNOSIS — E11.9 CONTROLLED TYPE 2 DIABETES MELLITUS WITHOUT COMPLICATION, WITHOUT LONG-TERM CURRENT USE OF INSULIN (HCC): Primary | ICD-10-CM

## 2021-09-16 LAB
CREAT SERPL-MCNC: 0.66 MG/DL (ref 0.7–1.2)
GFR AFRICAN AMERICAN: >60 ML/MIN
GFR NON-AFRICAN AMERICAN: >60 ML/MIN
GFR SERPL CREATININE-BSD FRML MDRD: ABNORMAL ML/MIN/{1.73_M2}
GFR SERPL CREATININE-BSD FRML MDRD: ABNORMAL ML/MIN/{1.73_M2}

## 2021-09-16 PROCEDURE — 82565 ASSAY OF CREATININE: CPT

## 2021-09-16 PROCEDURE — 36415 COLL VENOUS BLD VENIPUNCTURE: CPT

## 2021-09-17 ENCOUNTER — HOSPITAL ENCOUNTER (OUTPATIENT)
Dept: CT IMAGING | Age: 46
Discharge: HOME | End: 2021-09-19
Payer: MEDICARE

## 2021-09-17 DIAGNOSIS — R63.4 UNEXPLAINED WEIGHT LOSS: ICD-10-CM

## 2021-09-17 DIAGNOSIS — R35.0 URINARY FREQUENCY: ICD-10-CM

## 2021-09-17 PROCEDURE — 2709999900 CT ABDOMEN PELVIS W IV CONTRAST

## 2021-09-17 PROCEDURE — 6360000004 HC RX CONTRAST MEDICATION: Performed by: FAMILY MEDICINE

## 2021-09-17 RX ADMIN — IOPAMIDOL 100 ML: 755 INJECTION, SOLUTION INTRAVENOUS at 13:36

## 2021-09-17 RX ADMIN — IOHEXOL 50 ML: 240 INJECTION, SOLUTION INTRATHECAL; INTRAVASCULAR; INTRAVENOUS; ORAL at 13:36

## 2021-10-01 ENCOUNTER — TELEPHONE (OUTPATIENT)
Dept: FAMILY MEDICINE CLINIC | Age: 46
End: 2021-10-01

## 2021-10-01 NOTE — TELEPHONE ENCOUNTER
Patient's caregiver is calling and stating that they need orders to discontinue his Metformin and Glimepiride sent to [facility] so they will not put them in his pill packs.

## 2021-10-27 ENCOUNTER — OFFICE VISIT (OUTPATIENT)
Dept: FAMILY MEDICINE CLINIC | Age: 46
End: 2021-10-27
Payer: MEDICARE

## 2021-10-27 VITALS
WEIGHT: 132.4 LBS | HEART RATE: 118 BPM | HEIGHT: 67 IN | TEMPERATURE: 97.3 F | BODY MASS INDEX: 20.78 KG/M2 | OXYGEN SATURATION: 100 %

## 2021-10-27 DIAGNOSIS — R63.4 UNEXPLAINED WEIGHT LOSS: Primary | ICD-10-CM

## 2021-10-27 DIAGNOSIS — F84.0 AUTISM: ICD-10-CM

## 2021-10-27 PROCEDURE — 99213 OFFICE O/P EST LOW 20 MIN: CPT | Performed by: FAMILY MEDICINE

## 2021-10-27 PROCEDURE — G8484 FLU IMMUNIZE NO ADMIN: HCPCS | Performed by: FAMILY MEDICINE

## 2021-10-27 PROCEDURE — 99212 OFFICE O/P EST SF 10 MIN: CPT | Performed by: FAMILY MEDICINE

## 2021-10-27 PROCEDURE — G8420 CALC BMI NORM PARAMETERS: HCPCS | Performed by: FAMILY MEDICINE

## 2021-10-27 PROCEDURE — 1036F TOBACCO NON-USER: CPT | Performed by: FAMILY MEDICINE

## 2021-10-27 PROCEDURE — G8427 DOCREV CUR MEDS BY ELIG CLIN: HCPCS | Performed by: FAMILY MEDICINE

## 2021-10-27 RX ORDER — ATORVASTATIN CALCIUM 40 MG/1
TABLET, FILM COATED ORAL
Qty: 28 TABLET | Refills: 5 | Status: SHIPPED | OUTPATIENT
Start: 2021-10-27 | End: 2022-04-13

## 2021-10-27 NOTE — PROGRESS NOTES
10/27/2021     Boubacar Wagner (:  1975) is a 48 y.o. male, here for evaluation of the following medical concerns:    HPI  Patient comes in today for reevaluation secondary to unexplained weight loss. After his last office visit we did stop his diabetic regimen as his blood sugars have been running relatively good and he continued to lose weight. He is very active and does do a lot of movement and walking throughout the house constantly. It is suspected that this probably has contributed to his weight loss as his GI work-up was relatively negative and additional lab testing did not show any obvious metabolic abnormality contributing to his weight loss. Since stopping his diabetic regimen he has made 4 pounds. He does eat a fair amount of food. Eats good portion sizes. I think due to his autism he just is constantly moving and I think that has increased his metabolism significantly. We did also increase his Risperdal to try to help with excessive movement and his caregiver notes that it has not really changed significantly with the increased dose but he is tolerating it well and at this point feels comfortable with his current med dosing. Has been having some behavioral issues in the home but this is due to the fact that he now has a roommate in his home setting and they did move to a new home so he has had a lot of changes that likely are contributing. Patient otherwise has no other acute medical concerns. Did review patient's med list, allergies, social history, fam history, pmhx and pshx today as noted in the record. Review of Systems   Constitutional: Negative for activity change, appetite change, chills, diaphoresis, fatigue and fever. Psychiatric/Behavioral: Positive for behavioral problems (has had some issues with increased behaviors due to change in home environment. ). Prior to Visit Medications    Medication Sig Taking?  Authorizing Provider   risperiDONE (RISPERDAL) 1 MG tablet 2 tabs in am and 1 at night  Kathy Failing, DO   oxybutynin (DITROPAN XL) 10 MG extended release tablet Take 1 tablet by mouth daily  Rachele Diaz,    polyethylene glycol (GLYCOLAX) 17 GM/SCOOP powder Use per bowel prep  Sangeeta Doran,    atorvastatin (LIPITOR) 40 MG tablet TAKE 1 TABLET BY MOUTH DAILY  Kathy Failing, DO   sodium chloride (ALTAMIST SPRAY) 0.65 % nasal spray Use 2 sprays in each nostril three times daily prn dryness  Rachele Diaz, DO   hydrocortisone (ANUSOL-HC) 2.5 % CREA rectal cream Apply to perirectal area as needed for itching or irritation  Kathy Failing, DO   GLUCOCARD VITAL TEST strip TEST ONCE DAILY  Kathy Failing, DO        Social History     Tobacco Use    Smoking status: Never Smoker    Smokeless tobacco: Never Used   Substance Use Topics    Alcohol use: No        There were no vitals filed for this visit. Estimated body mass index is 19.58 kg/m² as calculated from the following:    Height as of 8/26/21: 5' 8\" (1.727 m). Weight as of 8/26/21: 128 lb 12.8 oz (58.4 kg). Physical Exam  Vitals and nursing note reviewed. Constitutional:       General: He is not in acute distress. Appearance: He is well-developed. He is not diaphoretic. HENT:      Head: Normocephalic and atraumatic. Eyes:      Conjunctiva/sclera: Conjunctivae normal.   Pulmonary:      Effort: Pulmonary effort is normal.   Musculoskeletal:      Cervical back: Normal range of motion. Skin:     General: Skin is warm and dry. Coloration: Skin is not pale. Findings: No erythema or rash. Neurological:      Mental Status: He is alert. Psychiatric:         Behavior: Behavior normal.         Thought Content: Thought content normal.         Judgment: Judgment normal.         ASSESSMENT/PLAN:  Encounter Diagnoses   Name Primary?  Unexplained weight loss Yes    Autism      No orders of the defined types were placed in this encounter.     At this point as he has gained weight we will continue with close monitoring. Did not add back any further medical therapy as his blood sugar seem to be staying relatively stable. Are to continue to follow adequate dietary intake. Patient is to return to my office beginning of December or sooner if any further problems or symptoms arise. (Please note that portions of this note were completed with a voice recognition program. Efforts were made to edit the dictations but occasionally words are mis-transcribed.)        No follow-ups on file. An electronic signature was used to authenticate this note.     --Kathy Baker, DO on 10/27/2021 at 7:17 AM

## 2021-10-27 NOTE — TELEPHONE ENCOUNTER
Kayla Chappell called requesting a refill of the below medication which has been pended for you:     Requested Prescriptions     Pending Prescriptions Disp Refills    atorvastatin (LIPITOR) 40 MG tablet [Pharmacy Med Name: ATORVASTATIN 40 MG TABLET 40 Tablet] 28 tablet 5     Sig: TAKE 1 TABLET BY MOUTH DAILY       Last Appointment Date: 8/26/2021  Next Appointment Date: 10/27/2021    No Known Allergies

## 2021-11-19 RX ORDER — RISPERIDONE 1 MG/1
1 TABLET, FILM COATED ORAL 3 TIMES DAILY
Qty: 84 TABLET | Refills: 5 | Status: SHIPPED | OUTPATIENT
Start: 2021-11-19 | End: 2021-12-01 | Stop reason: CLARIF

## 2021-11-19 NOTE — TELEPHONE ENCOUNTER
[facility] called requesting a refill of the below medication which has been pended for you:     Requested Prescriptions     Pending Prescriptions Disp Refills    risperiDONE (RISPERDAL) 1 MG tablet [Pharmacy Med Name: RISPERIDONE 1 MG TABS 1 Tablet] 84 tablet 5     Sig: Take 1 tablet by mouth 3 times daily       Last Appointment Date: 10/27/2021  Next Appointment Date: 12/15/2021    No Known Allergies

## 2021-12-01 ENCOUNTER — TELEPHONE (OUTPATIENT)
Dept: FAMILY MEDICINE CLINIC | Age: 46
End: 2021-12-01

## 2021-12-01 RX ORDER — RISPERIDONE 1 MG/1
TABLET, FILM COATED ORAL
Qty: 90 TABLET | Refills: 2
Start: 2021-12-01 | End: 2022-01-19

## 2021-12-01 NOTE — TELEPHONE ENCOUNTER
----- Message from [facility] sent at 12/1/2021  2:22 PM EST -----  Subject: Medication Problem    QUESTIONS  Name of Medication? risperiDONE (RISPERDAL) 1 MG tablet  Patient-reported dosage and instructions? Take 1 tablet by mouth 3 times   daily  What question or problem do you have with the medication? Pharmacy is   wanting to verify dosage since pt is in a group home. [phone]  Preferred Pharmacy? [address] [place] [address], [address] [place] [address] [phone]  Pharmacy phone number (if available)? [phone]  Additional Information for Provider? Pharmacy is wanting to verify dosage   since pt is in a group home  ---------------------------------------------------------------------------  --------------  [address]  What is the best way for the office to contact you? OK to leave message on   voicemail  Preferred Call Back Phone Number? [phone]  ---------------------------------------------------------------------------  --------------  SCRIPT ANSWERS  Relationship to Patient?  Third Party  Representative Name? Jack Bearden

## 2021-12-01 NOTE — TELEPHONE ENCOUNTER
Spoke with Johns caretaker and he is taking Risperdal 1 mg 2 in the am and 1 in the evening. Did change the directions in our chart and called Wil back to clarify with them.

## 2021-12-01 NOTE — TELEPHONE ENCOUNTER
----- Message from [facility] sent at 12/1/2021  2:22 PM EST -----  Subject: Medication Problem    QUESTIONS  Name of Medication? risperiDONE (RISPERDAL) 1 MG tablet  Patient-reported dosage and instructions? Take 1 tablet by mouth 3 times   daily  What question or problem do you have with the medication? Pharmacy is   wanting to verify dosage since pt is in a group home. [phone]  Preferred Pharmacy? [facility], [address] [place] [address] [phone]  Pharmacy phone number (if available)? [phone]  Additional Information for Provider? Pharmacy is wanting to verify dosage   since pt is in a group home  ---------------------------------------------------------------------------  --------------  [address]  What is the best way for the office to contact you? OK to leave message on   voicemail  Preferred Call Back Phone Number? [phone]  ---------------------------------------------------------------------------  --------------  SCRIPT ANSWERS  Relationship to Patient?  Third Party  Representative Name? Cleda Dose

## 2021-12-09 ENCOUNTER — HOSPITAL ENCOUNTER (OUTPATIENT)
Dept: LAB | Age: 46
Discharge: HOME | End: 2021-12-09
Payer: MEDICARE

## 2021-12-09 DIAGNOSIS — E78.2 MIXED HYPERLIPIDEMIA: ICD-10-CM

## 2021-12-09 DIAGNOSIS — E11.9 CONTROLLED TYPE 2 DIABETES MELLITUS WITHOUT COMPLICATION, WITHOUT LONG-TERM CURRENT USE OF INSULIN (HCC): ICD-10-CM

## 2021-12-09 LAB
ABSOLUTE EOS #: 0.06 K/UL (ref 0–0.44)
ABSOLUTE IMMATURE GRANULOCYTE: <0.03 K/UL (ref 0–0.3)
ABSOLUTE LYMPH #: 1.05 K/UL (ref 1.1–3.7)
ABSOLUTE MONO #: 0.33 K/UL (ref 0.1–1.2)
ALBUMIN SERPL-MCNC: 4.6 G/DL (ref 3.5–5.2)
ALBUMIN/GLOBULIN RATIO: 1.7 (ref 1–2.5)
ALP BLD-CCNC: 47 U/L (ref 40–129)
ALT SERPL-CCNC: 18 U/L (ref 5–41)
ANION GAP SERPL CALCULATED.3IONS-SCNC: 10 MMOL/L (ref 9–17)
AST SERPL-CCNC: 18 U/L
BASOPHILS # BLD: 1 % (ref 0–2)
BASOPHILS ABSOLUTE: 0.04 K/UL (ref 0–0.2)
BILIRUB SERPL-MCNC: 0.64 MG/DL (ref 0.3–1.2)
BUN BLDV-MCNC: 11 MG/DL (ref 6–20)
BUN/CREAT BLD: 17 (ref 9–20)
CALCIUM SERPL-MCNC: 9.9 MG/DL (ref 8.6–10.4)
CHLORIDE BLD-SCNC: 103 MMOL/L (ref 98–107)
CHOLESTEROL/HDL RATIO: 1.8
CHOLESTEROL: 126 MG/DL
CO2: 28 MMOL/L (ref 20–31)
CREAT SERPL-MCNC: 0.66 MG/DL (ref 0.7–1.2)
DIFFERENTIAL TYPE: ABNORMAL
EOSINOPHILS RELATIVE PERCENT: 1 % (ref 1–4)
ESTIMATED AVERAGE GLUCOSE: 123 MG/DL
GFR AFRICAN AMERICAN: >60 ML/MIN
GFR NON-AFRICAN AMERICAN: >60 ML/MIN
GFR SERPL CREATININE-BSD FRML MDRD: ABNORMAL ML/MIN/{1.73_M2}
GFR SERPL CREATININE-BSD FRML MDRD: ABNORMAL ML/MIN/{1.73_M2}
GLUCOSE BLD-MCNC: 104 MG/DL (ref 70–99)
HBA1C MFR BLD: 5.9 % (ref 4–6)
HCT VFR BLD CALC: 39.5 % (ref 40.7–50.3)
HDLC SERPL-MCNC: 72 MG/DL
HEMOGLOBIN: 13.2 G/DL (ref 13–17)
IMMATURE GRANULOCYTES: 0 %
LDL CHOLESTEROL: 43 MG/DL (ref 0–130)
LYMPHOCYTES # BLD: 22 % (ref 24–43)
MCH RBC QN AUTO: 31 PG (ref 25.2–33.5)
MCHC RBC AUTO-ENTMCNC: 33.4 G/DL (ref 25.2–33.5)
MCV RBC AUTO: 92.7 FL (ref 82.6–102.9)
MONOCYTES # BLD: 7 % (ref 3–12)
NRBC AUTOMATED: 0 PER 100 WBC
PDW BLD-RTO: 12.4 % (ref 11.8–14.4)
PLATELET # BLD: 185 K/UL (ref 138–453)
PLATELET ESTIMATE: ABNORMAL
PMV BLD AUTO: 9.5 FL (ref 8.1–13.5)
POTASSIUM SERPL-SCNC: 4.4 MMOL/L (ref 3.7–5.3)
RBC # BLD: 4.26 M/UL (ref 4.21–5.77)
RBC # BLD: ABNORMAL 10*6/UL
SEG NEUTROPHILS: 69 % (ref 36–65)
SEGMENTED NEUTROPHILS ABSOLUTE COUNT: 3.22 K/UL (ref 1.5–8.1)
SODIUM BLD-SCNC: 141 MMOL/L (ref 135–144)
TOTAL PROTEIN: 7.3 G/DL (ref 6.4–8.3)
TRIGL SERPL-MCNC: 56 MG/DL
VLDLC SERPL CALC-MCNC: NORMAL MG/DL (ref 1–30)
WBC # BLD: 4.7 K/UL (ref 3.5–11.3)
WBC # BLD: ABNORMAL 10*3/UL

## 2021-12-09 PROCEDURE — 85025 COMPLETE CBC W/AUTO DIFF WBC: CPT

## 2021-12-09 PROCEDURE — 83036 HEMOGLOBIN GLYCOSYLATED A1C: CPT

## 2021-12-09 PROCEDURE — 36415 COLL VENOUS BLD VENIPUNCTURE: CPT

## 2021-12-09 PROCEDURE — 80061 LIPID PANEL: CPT

## 2021-12-09 PROCEDURE — 80053 COMPREHEN METABOLIC PANEL: CPT

## 2021-12-21 ENCOUNTER — OFFICE VISIT (OUTPATIENT)
Dept: FAMILY MEDICINE CLINIC | Age: 46
End: 2021-12-21
Payer: MEDICARE

## 2021-12-21 VITALS
BODY MASS INDEX: 20.56 KG/M2 | RESPIRATION RATE: 16 BRPM | HEIGHT: 67 IN | WEIGHT: 131 LBS | SYSTOLIC BLOOD PRESSURE: 110 MMHG | DIASTOLIC BLOOD PRESSURE: 64 MMHG | OXYGEN SATURATION: 99 % | HEART RATE: 118 BPM

## 2021-12-21 DIAGNOSIS — E11.9 CONTROLLED TYPE 2 DIABETES MELLITUS WITHOUT COMPLICATION, WITHOUT LONG-TERM CURRENT USE OF INSULIN (HCC): Primary | ICD-10-CM

## 2021-12-21 DIAGNOSIS — R63.4 UNEXPLAINED WEIGHT LOSS: ICD-10-CM

## 2021-12-21 DIAGNOSIS — F84.0 AUTISM: ICD-10-CM

## 2021-12-21 DIAGNOSIS — E78.2 MIXED HYPERLIPIDEMIA: ICD-10-CM

## 2021-12-21 DIAGNOSIS — Z23 NEED FOR INFLUENZA VACCINATION: ICD-10-CM

## 2021-12-21 PROCEDURE — 99214 OFFICE O/P EST MOD 30 MIN: CPT | Performed by: FAMILY MEDICINE

## 2021-12-21 PROCEDURE — G8482 FLU IMMUNIZE ORDER/ADMIN: HCPCS | Performed by: FAMILY MEDICINE

## 2021-12-21 PROCEDURE — 1036F TOBACCO NON-USER: CPT | Performed by: FAMILY MEDICINE

## 2021-12-21 PROCEDURE — 3044F HG A1C LEVEL LT 7.0%: CPT | Performed by: FAMILY MEDICINE

## 2021-12-21 PROCEDURE — 2022F DILAT RTA XM EVC RTNOPTHY: CPT | Performed by: FAMILY MEDICINE

## 2021-12-21 PROCEDURE — G8420 CALC BMI NORM PARAMETERS: HCPCS | Performed by: FAMILY MEDICINE

## 2021-12-21 PROCEDURE — G8427 DOCREV CUR MEDS BY ELIG CLIN: HCPCS | Performed by: FAMILY MEDICINE

## 2021-12-21 PROCEDURE — PBSHW INFLUENZA, QUADV, 3 YRS AND OLDER, IM PF, PREFILL SYR OR SDV, 0.5ML (AFLURIA QUADV, PF): Performed by: FAMILY MEDICINE

## 2021-12-21 PROCEDURE — 90686 IIV4 VACC NO PRSV 0.5 ML IM: CPT | Performed by: FAMILY MEDICINE

## 2021-12-21 ASSESSMENT — ENCOUNTER SYMPTOMS
EYE DISCHARGE: 0
SINUS PRESSURE: 0
RHINORRHEA: 0
WHEEZING: 0
DIARRHEA: 0
CONSTIPATION: 0
TROUBLE SWALLOWING: 0
NAUSEA: 0
SHORTNESS OF BREATH: 0
ABDOMINAL PAIN: 0
EYE REDNESS: 0
COUGH: 0
SORE THROAT: 0
VOMITING: 0

## 2021-12-21 NOTE — PROGRESS NOTES
2021     Ashly Bass (:  1975) is a 48 y.o. male, here for evaluation of the following medical concerns:    HPI  Patient comes in today for follow up of chronic health issues Patient comes in today with his caregiver. He is doing relatively well overall. No significant concerns today. Seems to be having more times where he is able to sit down and relax. Not doing as much pacing but still has a fair amount of ongoing activity where he does do some pacing off and on. His weight is neutral at this point compared to last check. Eating very well. Seems to be getting more settled in his home situation. With regards to his diabetes it is stable and controlled without ongoing medical therapy. Does have a known history of hyperlipidemia and his cholesterol levels are stable and adequately controlled on his current statin dose. Does have known autism and does require continuous care but has excellent caregivers and seems to be doing well in his current environment. Patient otherwise has no other acute medical concerns. .  Patient's recent lab reports are as follows:    Results for orders placed or performed during the hospital encounter of 21   Lipid Panel   Result Value Ref Range    Cholesterol 126 <200 mg/dL    HDL 72 >40 mg/dL    LDL Cholesterol 43 0 - 130 mg/dL    Chol/HDL Ratio 1.8 <5    Triglycerides 56 <150 mg/dL    VLDL NOT REPORTED 1 - 30 mg/dL   Hemoglobin A1C   Result Value Ref Range    Hemoglobin A1C 5.9 4.0 - 6.0 %    Estimated Avg Glucose 123 mg/dL   Comprehensive Metabolic Panel   Result Value Ref Range    Glucose 104 (H) 70 - 99 mg/dL    BUN 11 6 - 20 mg/dL    CREATININE 0.66 (L) 0.70 - 1.20 mg/dL    Bun/Cre Ratio 17 9 - 20    Calcium 9.9 8.6 - 10.4 mg/dL    Sodium 141 135 - 144 mmol/L    Potassium 4.4 3.7 - 5.3 mmol/L    Chloride 103 98 - 107 mmol/L    CO2 28 20 - 31 mmol/L    Anion Gap 10 9 - 17 mmol/L    Alkaline Phosphatase 47 40 - 129 U/L    ALT 18 5 - 41 U/L    AST 18 <40 U/L    Total Bilirubin 0.64 0.3 - 1.2 mg/dL    Total Protein 7.3 6.4 - 8.3 g/dL    Albumin 4.6 3.5 - 5.2 g/dL    Albumin/Globulin Ratio 1.7 1.0 - 2.5    GFR Non-African American >60 >60 mL/min    GFR African American >60 >60 mL/min    GFR Comment          GFR Staging NOT REPORTED    CBC Auto Differential   Result Value Ref Range    WBC 4.7 3.5 - 11.3 k/uL    RBC 4.26 4.21 - 5.77 m/uL    Hemoglobin 13.2 13.0 - 17.0 g/dL    Hematocrit 39.5 (L) 40.7 - 50.3 %    MCV 92.7 82.6 - 102.9 fL    MCH 31.0 25.2 - 33.5 pg    MCHC 33.4 25.2 - 33.5 g/dL    RDW 12.4 11.8 - 14.4 %    Platelets 795 610 - 103 k/uL    MPV 9.5 8.1 - 13.5 fL    NRBC Automated 0.0 0.0 per 100 WBC    Differential Type NOT REPORTED     Seg Neutrophils 69 (H) 36 - 65 %    Lymphocytes 22 (L) 24 - 43 %    Monocytes 7 3 - 12 %    Eosinophils % 1 1 - 4 %    Basophils 1 0 - 2 %    Immature Granulocytes 0 0 %    Segs Absolute 3.22 1.50 - 8.10 k/uL    Absolute Lymph # 1.05 (L) 1.10 - 3.70 k/uL    Absolute Mono # 0.33 0.10 - 1.20 k/uL    Absolute Eos # 0.06 0.00 - 0.44 k/uL    Basophils Absolute 0.04 0.00 - 0.20 k/uL    Absolute Immature Granulocyte <0.03 0.00 - 0.30 k/uL    WBC Morphology NOT REPORTED     RBC Morphology NOT REPORTED     Platelet Estimate NOT REPORTED       Other review of systems are as noted below. Preventative measures are reviewed today. See health maintenance section for complete preventative plan of care. Did review patient's med list, allergies, social history, fam history, pmhx and pshx today as noted in the record. Review of Systems   Constitutional: Negative for chills, fatigue and fever. HENT: Negative for congestion, ear pain, postnasal drip, rhinorrhea, sinus pressure, sore throat and trouble swallowing. Eyes: Negative for discharge and redness. Respiratory: Negative for cough, shortness of breath and wheezing. Cardiovascular: Negative for chest pain.    Gastrointestinal: Negative for abdominal pain, constipation, diarrhea, nausea and vomiting. Genitourinary: Negative for dysuria, flank pain, frequency and urgency. Musculoskeletal: Negative for arthralgias, myalgias and neck pain. Skin: Negative for rash and wound. Allergic/Immunologic: Negative for environmental allergies. Neurological: Negative for dizziness, weakness, light-headedness and headaches. Hematological: Negative for adenopathy. Psychiatric/Behavioral: Negative. Prior to Visit Medications    Medication Sig Taking? Authorizing Provider   risperiDONE (RISPERDAL) 1 MG tablet 2 tabs in am and 1 at night  Gal Sullivan DO   atorvastatin (LIPITOR) 40 MG tablet TAKE 1 TABLET BY MOUTH DAILY  Gal Sullivan DO   oxybutynin (DITROPAN XL) 10 MG extended release tablet Take 1 tablet by mouth daily  Rachele Diaz DO   polyethylene glycol [facility]) 17 GM/SCOOP powder Use per bowel prep  Patient not taking: Reported on 10/27/2021  Dhruv Orourke DO   sodium chloride (ALTAMIST SPRAY) 0.65 % nasal spray Use 2 sprays in each nostril three times daily prn dryness  Rachele Diaz DO   hydrocortisone (ANUSOL-HC) 2.5 % CREA rectal cream Apply to perirectal area as needed for itching or irritation  Gal Sullivan DO   GLUCOCARD VITAL TEST strip TEST ONCE DAILY  Gal Sullivan DO        Social History     Tobacco Use    Smoking status: Never Smoker    Smokeless tobacco: Never Used   Substance Use Topics    Alcohol use: No        There were no vitals filed for this visit. Estimated body mass index is 20.74 kg/m² as calculated from the following:    Height as of 10/27/21: 5' 7\" (1.702 m). Weight as of 10/27/21: 132 lb 6.4 oz (60.1 kg). Physical Exam  Vitals and nursing note reviewed. Constitutional:       General: He is not in acute distress. Appearance: Normal appearance. He is well-developed. He is not diaphoretic. HENT:      Head: Normocephalic and atraumatic.       Right Ear: Tympanic membrane, ear canal and external ear normal.      Left Ear: Tympanic membrane, ear canal and external ear normal.      Nose: Nose normal.      Mouth/Throat:      Mouth: Mucous membranes are moist.      Pharynx: Oropharynx is clear. No oropharyngeal exudate. Eyes:      General:         Right eye: No discharge. Left eye: No discharge. Conjunctiva/sclera: Conjunctivae normal.      Pupils: Pupils are equal, round, and reactive to light. Neck:      Thyroid: No thyromegaly. Cardiovascular:      Rate and Rhythm: Normal rate and regular rhythm. Heart sounds: Normal heart sounds. Pulmonary:      Effort: Pulmonary effort is normal.      Breath sounds: Normal breath sounds. No wheezing or rales. Abdominal:      General: Bowel sounds are normal. There is no distension. Palpations: Abdomen is soft. Tenderness: There is no abdominal tenderness. Musculoskeletal:      Cervical back: Normal range of motion and neck supple. Lymphadenopathy:      Cervical: No cervical adenopathy. Skin:     General: Skin is warm and dry. Findings: No rash. Neurological:      Mental Status: He is alert and oriented to person, place, and time. Psychiatric:         Behavior: Behavior normal.         Thought Content: Thought content normal.         Judgment: Judgment normal.           ASSESSMENT/PLAN:  Encounter Diagnoses   Name Primary?  Controlled type 2 diabetes mellitus without complication, without long-term current use of insulin (HCC) Yes    Mixed hyperlipidemia     Autism     Unexplained weight loss     Need for influenza vaccination      No orders of the defined types were placed in this encounter.     Orders Placed This Encounter   Procedures    INFLUENZA, QUADV, 3 YRS AND OLDER, IM PF, PREFILL SYR OR SDV, 0.5ML (AFLURIA QUADV, PF)    Comprehensive Metabolic Panel     Standing Status:   Future     Standing Expiration Date:   12/21/2022    CBC Auto Differential     Standing Status:   Future Standing Expiration Date:   12/21/2022    Lipid Panel     Standing Status:   Future     Standing Expiration Date:   12/21/2022     Order Specific Question:   Is Patient Fasting?/# of Hours     Answer:   12 hours    Hemoglobin A1C     Standing Status:   Future     Standing Expiration Date:   12/21/2022     Patient is to continue on his current medical regimen. No changes are made at this time. Patient is to continue to follow a low-carb/low sugar/low fat diet for optimal blood sugar and cholesterol control. We will need to monitor his weight to make sure that he does not have any continued weight loss. Patient is to return to my office in 6 months duration or sooner if any further problems or symptoms arise. (Please note that portions of this note were completed with a voice recognition program. Efforts were made to edit the dictations but occasionally words are mis-transcribed.)        No follow-ups on file. An electronic signature was used to authenticate this note.     --Josep Morgan, DO on 12/21/2021 at 7:32 AM

## 2022-01-19 RX ORDER — RISPERIDONE 1 MG/1
TABLET, FILM COATED ORAL
Qty: 90 TABLET | Refills: 2 | Status: SHIPPED | OUTPATIENT
Start: 2022-01-19 | End: 2022-02-16

## 2022-01-19 NOTE — TELEPHONE ENCOUNTER
Anastasiia Mccurdy called requesting a refill of the below medication which has been pended for you:     Requested Prescriptions     Pending Prescriptions Disp Refills    risperiDONE (RISPERDAL) 1 MG tablet [Pharmacy Med Name: RISPERIDONE 1 MG TABS 1 Tablet] 84 tablet 2     Sig: TAKE 2 TABLETS DAILY ~108 TAKE 1 TABLET AT BEDTIME       Last Appointment Date: 12/21/2021  Next Appointment Date: 6/21/2022    No Known Allergies

## 2022-02-16 RX ORDER — RISPERIDONE 1 MG/1
1 TABLET, FILM COATED ORAL NIGHTLY
Qty: 30 TABLET | Refills: 5 | Status: SHIPPED | OUTPATIENT
Start: 2022-02-16 | End: 2022-06-21 | Stop reason: SDUPTHER

## 2022-02-16 RX ORDER — OXYBUTYNIN CHLORIDE 10 MG/1
10 TABLET, EXTENDED RELEASE ORAL DAILY
Qty: 30 TABLET | Refills: 1 | Status: SHIPPED | OUTPATIENT
Start: 2022-02-16 | End: 2022-04-13

## 2022-02-16 RX ORDER — RISPERIDONE 2 MG/1
TABLET, FILM COATED ORAL
Qty: 30 TABLET | Refills: 5 | Status: SHIPPED | OUTPATIENT
Start: 2022-02-16 | End: 2022-05-12

## 2022-02-16 NOTE — TELEPHONE ENCOUNTER
Estela Randhawa called requesting a refill of the below medication which has been pended for you:     Requested Prescriptions     Pending Prescriptions Disp Refills    oxybutynin (DITROPAN-XL) 10 MG extended release tablet [Pharmacy Med Name: OXYBUTYNIN CHLORIDE ER 10 M 10 Tablet] 28 tablet 1     Sig: TAKE 1 TABLET BY MOUTH DAILY    risperiDONE (RISPERDAL) 1 MG tablet [Pharmacy Med Name: RISPERIDONE 1 MG TABS 1 Tablet] 84 tablet 2     Sig: TAKE 2 TABLETS DAILY ~108 TAKE 1 TABLET AT BEDTIME       Last Appointment Date: 12/21/2021  Next Appointment Date: 6/21/2022    No Known Allergies

## 2022-04-13 NOTE — TELEPHONE ENCOUNTER
Hilary Rico called requesting a refill of the below medication which has been pended for you:     Requested Prescriptions     Pending Prescriptions Disp Refills    oxybutynin (DITROPAN-XL) 10 MG extended release tablet [Pharmacy Med Name: OXYBUTYNIN CHLORIDE ER 10 M 10 Tablet] 28 tablet 1     Sig: TAKE 1 TABLET BY MOUTH DAILY    atorvastatin (LIPITOR) 40 MG tablet [Pharmacy Med Name: ATORVASTATIN 40 MG TABLET 40 Tablet] 28 tablet 5     Sig: TAKE 1 TABLET BY MOUTH DAILY       Last Appointment Date: 12/21/2021  Next Appointment Date: 6/21/2022    No Known Allergies
112

## 2022-04-14 RX ORDER — OXYBUTYNIN CHLORIDE 10 MG/1
10 TABLET, EXTENDED RELEASE ORAL DAILY
Qty: 30 TABLET | Refills: 5 | Status: SHIPPED | OUTPATIENT
Start: 2022-04-14

## 2022-04-14 RX ORDER — ATORVASTATIN CALCIUM 40 MG/1
TABLET, FILM COATED ORAL
Qty: 30 TABLET | Refills: 5 | Status: SHIPPED | OUTPATIENT
Start: 2022-04-14 | End: 2022-05-13

## 2022-05-12 RX ORDER — RISPERIDONE 2 MG/1
TABLET, FILM COATED ORAL
Qty: 30 TABLET | Refills: 5 | Status: SHIPPED | OUTPATIENT
Start: 2022-05-12 | End: 2022-10-25

## 2022-05-12 NOTE — TELEPHONE ENCOUNTER
Cortez Nguyen called requesting a refill of the below medication which has been pended for you:     Requested Prescriptions     Pending Prescriptions Disp Refills    risperiDONE (RISPERDAL) 2 MG tablet [Pharmacy Med Name: RISPERIDONE 2 MG TABS 2 Tablet] 28 tablet 3     Sig: TAKE 1 TABLET BY MOUTH DAILY       Last Appointment Date: 12/21/2021  Next Appointment Date: 6/21/2022    No Known Allergies

## 2022-05-13 RX ORDER — ATORVASTATIN CALCIUM 40 MG/1
TABLET, FILM COATED ORAL
Qty: 30 TABLET | Refills: 5 | Status: SHIPPED | OUTPATIENT
Start: 2022-05-13

## 2022-05-13 NOTE — TELEPHONE ENCOUNTER
Cierra Darling called requesting a refill of the below medication which has been pended for you:     Requested Prescriptions     Pending Prescriptions Disp Refills    atorvastatin (LIPITOR) 40 MG tablet [Pharmacy Med Name: ATORVASTATIN 40 MG TABLET 40 Tablet] 28 tablet 5     Sig: TAKE 1 TABLET BY MOUTH DAILY       Last Appointment Date: 12/21/2021  Next Appointment Date: 6/21/2022    No Known Allergies

## 2022-06-21 ENCOUNTER — OFFICE VISIT (OUTPATIENT)
Dept: FAMILY MEDICINE CLINIC | Age: 47
End: 2022-06-21
Payer: MEDICARE

## 2022-06-21 ENCOUNTER — HOSPITAL ENCOUNTER (OUTPATIENT)
Dept: LAB | Age: 47
Discharge: HOME | End: 2022-06-21
Payer: MEDICARE

## 2022-06-21 VITALS
DIASTOLIC BLOOD PRESSURE: 74 MMHG | HEART RATE: 116 BPM | RESPIRATION RATE: 16 BRPM | HEIGHT: 67 IN | OXYGEN SATURATION: 99 % | WEIGHT: 131 LBS | BODY MASS INDEX: 20.56 KG/M2 | SYSTOLIC BLOOD PRESSURE: 116 MMHG

## 2022-06-21 DIAGNOSIS — E78.2 MIXED HYPERLIPIDEMIA: ICD-10-CM

## 2022-06-21 DIAGNOSIS — F84.0 AUTISM: ICD-10-CM

## 2022-06-21 DIAGNOSIS — E11.9 CONTROLLED TYPE 2 DIABETES MELLITUS WITHOUT COMPLICATION, WITHOUT LONG-TERM CURRENT USE OF INSULIN (HCC): Primary | ICD-10-CM

## 2022-06-21 DIAGNOSIS — E11.9 CONTROLLED TYPE 2 DIABETES MELLITUS WITHOUT COMPLICATION, WITHOUT LONG-TERM CURRENT USE OF INSULIN (HCC): ICD-10-CM

## 2022-06-21 LAB
ABSOLUTE EOS #: 0.07 K/UL (ref 0–0.44)
ABSOLUTE IMMATURE GRANULOCYTE: 0.05 K/UL (ref 0–0.3)
ABSOLUTE LYMPH #: 1.06 K/UL (ref 1.1–3.7)
ABSOLUTE MONO #: 0.63 K/UL (ref 0.1–1.2)
ALBUMIN SERPL-MCNC: 4.5 G/DL (ref 3.5–5.2)
ALBUMIN/GLOBULIN RATIO: 1.6 (ref 1–2.5)
ALP BLD-CCNC: 44 U/L (ref 40–129)
ALT SERPL-CCNC: 15 U/L (ref 5–41)
ANION GAP SERPL CALCULATED.3IONS-SCNC: 10 MMOL/L (ref 9–17)
AST SERPL-CCNC: 18 U/L
BASOPHILS # BLD: 1 % (ref 0–2)
BASOPHILS ABSOLUTE: 0.05 K/UL (ref 0–0.2)
BILIRUB SERPL-MCNC: 0.32 MG/DL (ref 0.3–1.2)
BUN BLDV-MCNC: 11 MG/DL (ref 6–20)
BUN/CREAT BLD: 17 (ref 9–20)
CALCIUM SERPL-MCNC: 9.4 MG/DL (ref 8.6–10.4)
CHLORIDE BLD-SCNC: 102 MMOL/L (ref 98–107)
CHOLESTEROL/HDL RATIO: 1.8
CHOLESTEROL: 122 MG/DL
CO2: 29 MMOL/L (ref 20–31)
CREAT SERPL-MCNC: 0.66 MG/DL (ref 0.7–1.2)
EOSINOPHILS RELATIVE PERCENT: 1 % (ref 1–4)
GFR AFRICAN AMERICAN: >60 ML/MIN
GFR NON-AFRICAN AMERICAN: >60 ML/MIN
GFR SERPL CREATININE-BSD FRML MDRD: ABNORMAL ML/MIN/{1.73_M2}
GLUCOSE BLD-MCNC: 110 MG/DL (ref 70–99)
HCT VFR BLD CALC: 40.8 % (ref 40.7–50.3)
HDLC SERPL-MCNC: 66 MG/DL
HEMOGLOBIN: 13.6 G/DL (ref 13–17)
IMMATURE GRANULOCYTES: 1 %
LDL CHOLESTEROL: 43 MG/DL (ref 0–130)
LYMPHOCYTES # BLD: 10 % (ref 24–43)
MCH RBC QN AUTO: 31 PG (ref 25.2–33.5)
MCHC RBC AUTO-ENTMCNC: 33.3 G/DL (ref 25.2–33.5)
MCV RBC AUTO: 92.9 FL (ref 82.6–102.9)
MONOCYTES # BLD: 6 % (ref 3–12)
NRBC AUTOMATED: 0 PER 100 WBC
PDW BLD-RTO: 12.3 % (ref 11.8–14.4)
PLATELET # BLD: 201 K/UL (ref 138–453)
PMV BLD AUTO: 9.7 FL (ref 8.1–13.5)
POTASSIUM SERPL-SCNC: 3.8 MMOL/L (ref 3.7–5.3)
RBC # BLD: 4.39 M/UL (ref 4.21–5.77)
SEG NEUTROPHILS: 81 % (ref 36–65)
SEGMENTED NEUTROPHILS ABSOLUTE COUNT: 9.15 K/UL (ref 1.5–8.1)
SODIUM BLD-SCNC: 141 MMOL/L (ref 135–144)
TOTAL PROTEIN: 7.4 G/DL (ref 6.4–8.3)
TRIGL SERPL-MCNC: 67 MG/DL
WBC # BLD: 11 K/UL (ref 3.5–11.3)

## 2022-06-21 PROCEDURE — G8420 CALC BMI NORM PARAMETERS: HCPCS | Performed by: FAMILY MEDICINE

## 2022-06-21 PROCEDURE — 99214 OFFICE O/P EST MOD 30 MIN: CPT | Performed by: FAMILY MEDICINE

## 2022-06-21 PROCEDURE — 80061 LIPID PANEL: CPT

## 2022-06-21 PROCEDURE — 85025 COMPLETE CBC W/AUTO DIFF WBC: CPT

## 2022-06-21 PROCEDURE — G8427 DOCREV CUR MEDS BY ELIG CLIN: HCPCS | Performed by: FAMILY MEDICINE

## 2022-06-21 PROCEDURE — 80053 COMPREHEN METABOLIC PANEL: CPT

## 2022-06-21 PROCEDURE — 1036F TOBACCO NON-USER: CPT | Performed by: FAMILY MEDICINE

## 2022-06-21 PROCEDURE — 3046F HEMOGLOBIN A1C LEVEL >9.0%: CPT | Performed by: FAMILY MEDICINE

## 2022-06-21 PROCEDURE — 36415 COLL VENOUS BLD VENIPUNCTURE: CPT

## 2022-06-21 PROCEDURE — 2022F DILAT RTA XM EVC RTNOPTHY: CPT | Performed by: FAMILY MEDICINE

## 2022-06-21 PROCEDURE — 83036 HEMOGLOBIN GLYCOSYLATED A1C: CPT

## 2022-06-21 PROCEDURE — 99213 OFFICE O/P EST LOW 20 MIN: CPT | Performed by: FAMILY MEDICINE

## 2022-06-21 RX ORDER — RISPERIDONE 1 MG/1
TABLET, FILM COATED ORAL
Qty: 60 TABLET | Refills: 5 | Status: SHIPPED | OUTPATIENT
Start: 2022-06-21 | End: 2022-09-09 | Stop reason: DRUGHIGH

## 2022-06-21 SDOH — ECONOMIC STABILITY: FOOD INSECURITY: WITHIN THE PAST 12 MONTHS, THE FOOD YOU BOUGHT JUST DIDN'T LAST AND YOU DIDN'T HAVE MONEY TO GET MORE.: NEVER TRUE

## 2022-06-21 SDOH — ECONOMIC STABILITY: FOOD INSECURITY: WITHIN THE PAST 12 MONTHS, YOU WORRIED THAT YOUR FOOD WOULD RUN OUT BEFORE YOU GOT MONEY TO BUY MORE.: NEVER TRUE

## 2022-06-21 ASSESSMENT — ENCOUNTER SYMPTOMS
EYE DISCHARGE: 0
SINUS PRESSURE: 0
NAUSEA: 0
CONSTIPATION: 0
ABDOMINAL PAIN: 0
DIARRHEA: 0
WHEEZING: 0
RHINORRHEA: 0
TROUBLE SWALLOWING: 0
SORE THROAT: 0
EYE REDNESS: 0
VOMITING: 0
SHORTNESS OF BREATH: 0
COUGH: 0

## 2022-06-21 ASSESSMENT — PATIENT HEALTH QUESTIONNAIRE - PHQ9
1. LITTLE INTEREST OR PLEASURE IN DOING THINGS: 0
SUM OF ALL RESPONSES TO PHQ QUESTIONS 1-9: 0
2. FEELING DOWN, DEPRESSED OR HOPELESS: 0
SUM OF ALL RESPONSES TO PHQ QUESTIONS 1-9: 0
SUM OF ALL RESPONSES TO PHQ QUESTIONS 1-9: 0
SUM OF ALL RESPONSES TO PHQ9 QUESTIONS 1 & 2: 0
SUM OF ALL RESPONSES TO PHQ QUESTIONS 1-9: 0

## 2022-06-21 ASSESSMENT — SOCIAL DETERMINANTS OF HEALTH (SDOH): HOW HARD IS IT FOR YOU TO PAY FOR THE VERY BASICS LIKE FOOD, HOUSING, MEDICAL CARE, AND HEATING?: NOT HARD AT ALL

## 2022-06-21 NOTE — PROGRESS NOTES
2022     Gris Hester (:  1975) is a 48 y.o. male, here for evaluation of the following medical concerns:    HPI  Patient comes in today for follow up of chronic health issues Patient has been having some increased anxiety type symptoms. 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 This has caused him to have more increased anxiety as he has been doing a lot more pacing and constant movements. They had question about increasing his Risperdal dose. He is already on 2 mg in the morning so I did note that we could add an afternoon dose of 1 mg and continue with his 1 mg at night. We will see if this provides him with benefit and if not may need to consider alternate medical therapy. He does have a known history of diabetes mellitus type 2 and he did not get his lab work done prior to his office visit as the new caregiver was unaware that he needed to have lab work done. She notes however that when they check his blood sugars at home they are running within a reasonable range low 100s typically. He does eat well and eats quite a reasonable quantity of food. His weight is relatively neutral at this time. He has a known history of hyperlipidemia as well which has been stable and controlled with his current statin dose but we will have him get updated lab work today to check the status of his cholesterol control. Patient otherwise has no other acute medical concerns. .  Patient's recent lab reports are as follows:    Results for orders placed or performed during the hospital encounter of 21   Lipid Panel   Result Value Ref Range    Cholesterol 126 <200 mg/dL    HDL 72 >40 mg/dL    LDL Cholesterol 43 0 - 130 mg/dL    Chol/HDL Ratio 1.8 <5    Triglycerides 56 <150 mg/dL    VLDL NOT REPORTED 1 - 30 mg/dL   Hemoglobin A1C   Result Value Ref Range    Hemoglobin A1C 5.9 4.0 - 6.0 %    Estimated Avg Glucose 123 mg/dL   Comprehensive Metabolic Panel   Result Value Ref Range    Glucose 104 (H) 70 - 99 mg/dL    BUN 11 6 - 20 mg/dL    CREATININE 0.66 (L) 0.70 - 1.20 mg/dL    Bun/Cre Ratio 17 9 - 20    Calcium 9.9 8.6 - 10.4 mg/dL    Sodium 141 135 - 144 mmol/L    Potassium 4.4 3.7 - 5.3 mmol/L    Chloride 103 98 - 107 mmol/L    CO2 28 20 - 31 mmol/L    Anion Gap 10 9 - 17 mmol/L    Alkaline Phosphatase 47 40 - 129 U/L    ALT 18 5 - 41 U/L    AST 18 <40 U/L    Total Bilirubin 0.64 0.3 - 1.2 mg/dL    Total Protein 7.3 6.4 - 8.3 g/dL    Albumin 4.6 3.5 - 5.2 g/dL    Albumin/Globulin Ratio 1.7 1.0 - 2.5    GFR Non-African American >60 >60 mL/min    GFR African American >60 >60 mL/min    GFR Comment          GFR Staging NOT REPORTED    CBC Auto Differential   Result Value Ref Range    WBC 4.7 3.5 - 11.3 k/uL    RBC 4.26 4.21 - 5.77 m/uL    Hemoglobin 13.2 13.0 - 17.0 g/dL    Hematocrit 39.5 (L) 40.7 - 50.3 %    MCV 92.7 82.6 - 102.9 fL    MCH 31.0 25.2 - 33.5 pg    MCHC 33.4 25.2 - 33.5 g/dL    RDW 12.4 11.8 - 14.4 %    Platelets 843 492 - 098 k/uL    MPV 9.5 8.1 - 13.5 fL    NRBC Automated 0.0 0.0 per 100 WBC    Differential Type NOT REPORTED     Seg Neutrophils 69 (H) 36 - 65 %    Lymphocytes 22 (L) 24 - 43 %    Monocytes 7 3 - 12 %    Eosinophils % 1 1 - 4 %    Basophils 1 0 - 2 %    Immature Granulocytes 0 0 %    Segs Absolute 3.22 1.50 - 8.10 k/uL    Absolute Lymph # 1.05 (L) 1.10 - 3.70 k/uL    Absolute Mono # 0.33 0.10 - 1.20 k/uL    Absolute Eos # 0.06 0.00 - 0.44 k/uL    Basophils Absolute 0.04 0.00 - 0.20 k/uL    Absolute Immature Granulocyte <0.03 0.00 - 0.30 k/uL    WBC Morphology NOT REPORTED     RBC Morphology NOT REPORTED     Platelet Estimate NOT REPORTED       Other review of systems are as noted below. Preventative measures are reviewed today. See health maintenance section for complete preventative plan of care.     Did review patient's med list, allergies, social history, fam history, pmhx and pshx today as noted in the record. Review of Systems   Constitutional: Negative for chills, fatigue and fever. HENT: Negative for congestion, ear pain, postnasal drip, rhinorrhea, sinus pressure, sore throat and trouble swallowing. Eyes: Negative for discharge and redness. Respiratory: Negative for cough, shortness of breath and wheezing. Cardiovascular: Negative for chest pain. Gastrointestinal: Negative for abdominal pain, constipation, diarrhea, nausea and vomiting. Genitourinary: Negative for dysuria, flank pain, frequency and urgency. Musculoskeletal: Negative for arthralgias, myalgias and neck pain. Skin: Negative for rash and wound. Allergic/Immunologic: Negative for environmental allergies. Neurological: Negative for dizziness, weakness, light-headedness and headaches. Hematological: Negative for adenopathy. Psychiatric/Behavioral: Negative. Prior to Visit Medications    Medication Sig Taking? Authorizing Provider   atorvastatin (LIPITOR) 40 MG tablet TAKE 1 TABLET BY MOUTH DAILY  Annette Hamman, DO   risperiDONE (RISPERDAL) 2 MG tablet TAKE 1 TABLET BY MOUTH DAILY  Annette Hamman, DO   oxybutynin (DITROPAN-XL) 10 MG extended release tablet TAKE 1 TABLET BY MOUTH DAILY  Annette Hamman, DO   risperiDONE (RISPERDAL) 1 MG tablet Take 1 tablet by mouth at bedtime  Annette Hamman, DO   polyethylene glycol (GLYCOLAX) 17 GM/SCOOP powder Use per bowel prep  Luisa Acharya, DO   sodium chloride (ALTAMIST SPRAY) 0.65 % nasal spray Use 2 sprays in each nostril three times daily prn dryness  Rachele Diaz,    hydrocortisone (ANUSOL-HC) 2.5 % CREA rectal cream Apply to perirectal area as needed for itching or irritation  Annette Hamman, DO   GLUCOCARD VITAL TEST strip TEST ONCE DAILY  Annette Hamman, DO        Social History     Tobacco Use    Smoking status: Never Smoker    Smokeless tobacco: Never Used   Substance Use Topics    Alcohol use:  No insulin ([address] Yes    Mixed hyperlipidemia     Autism      Orders Placed This Encounter   Medications    risperiDONE (RISPERDAL) 1 MG tablet     Si mg in afternoon, 1 mg at night     Dispense:  60 tablet     Refill:  5     Orders Placed This Encounter   Procedures    CBC with Auto Differential     Standing Status:   Future     Standing Expiration Date:   2023    Comprehensive Metabolic Panel     Standing Status:   Future     Standing Expiration Date:   2023    Hemoglobin A1C     Standing Status:   Future     Standing Expiration Date:   2023    Lipid Panel     Standing Status:   Future     Standing Expiration Date:   2023     Order Specific Question:   Is Patient Fasting?/# of Hours     Answer:   12 hours    Microalbumin, Ur     Standing Status:   Future     Standing Expiration Date:   2023     Scheduling Instructions: To be done in 6 months     Did increase his Risperdal dose by 1 mg in the afternoon. This will make his total daily dose of 2 mg in the morning 1 mg in the afternoon and 1 mg in the evening. We will see if this helps with his pacing and constant movements. Patient is to get lab work done today as previously ordered. Will make further intervention based on testing reports. Patient is to continue on the rest of his current medical therapy. No additional changes are made at this time. Patient is to return to my office in 6 months duration or sooner if any further problems or symptoms arise. (Please note that portions of this note were completed with a voice recognition program. Efforts were made to edit the dictations but occasionally words are mis-transcribed.)        No follow-ups on file. An electronic signature was used to authenticate this note.     --Anderson Corbin DO on 2022 at 7:25 AM

## 2022-06-22 LAB
ESTIMATED AVERAGE GLUCOSE: 123 MG/DL
HBA1C MFR BLD: 5.9 % (ref 4–6)

## 2022-09-09 ENCOUNTER — OFFICE VISIT (OUTPATIENT)
Dept: FAMILY MEDICINE CLINIC | Age: 47
End: 2022-09-09
Payer: MEDICARE

## 2022-09-09 VITALS
WEIGHT: 127 LBS | RESPIRATION RATE: 16 BRPM | DIASTOLIC BLOOD PRESSURE: 60 MMHG | HEART RATE: 76 BPM | TEMPERATURE: 98.4 F | BODY MASS INDEX: 19.93 KG/M2 | SYSTOLIC BLOOD PRESSURE: 104 MMHG | OXYGEN SATURATION: 98 % | HEIGHT: 67 IN

## 2022-09-09 DIAGNOSIS — F41.9 ANXIETY: ICD-10-CM

## 2022-09-09 DIAGNOSIS — F99 INSOMNIA DUE TO OTHER MENTAL DISORDER: ICD-10-CM

## 2022-09-09 DIAGNOSIS — F84.0 AUTISM: ICD-10-CM

## 2022-09-09 DIAGNOSIS — F51.05 INSOMNIA DUE TO OTHER MENTAL DISORDER: ICD-10-CM

## 2022-09-09 DIAGNOSIS — T88.7XXA SIDE EFFECT OF MEDICATION: ICD-10-CM

## 2022-09-09 DIAGNOSIS — K11.7 DROOLING: Primary | ICD-10-CM

## 2022-09-09 PROCEDURE — G8420 CALC BMI NORM PARAMETERS: HCPCS | Performed by: FAMILY MEDICINE

## 2022-09-09 PROCEDURE — G8427 DOCREV CUR MEDS BY ELIG CLIN: HCPCS | Performed by: FAMILY MEDICINE

## 2022-09-09 PROCEDURE — 1036F TOBACCO NON-USER: CPT | Performed by: FAMILY MEDICINE

## 2022-09-09 PROCEDURE — 99212 OFFICE O/P EST SF 10 MIN: CPT | Performed by: FAMILY MEDICINE

## 2022-09-09 PROCEDURE — 99214 OFFICE O/P EST MOD 30 MIN: CPT | Performed by: FAMILY MEDICINE

## 2022-09-09 RX ORDER — RISPERIDONE 1 MG/1
TABLET, FILM COATED ORAL
Qty: 30 TABLET | Refills: 5 | Status: SHIPPED
Start: 2022-09-09

## 2022-09-09 RX ORDER — MIRTAZAPINE 15 MG/1
15 TABLET, FILM COATED ORAL NIGHTLY
Qty: 30 TABLET | Refills: 5 | Status: SHIPPED | OUTPATIENT
Start: 2022-09-09

## 2022-09-09 NOTE — PROGRESS NOTES
2022     Cherelle Dodge (:  1975) is a 50 y.o. male, here for evaluation of the following medical concerns:    HPI  Patient is seen today with one of his caregivers for concerns regarding increased issues with his medication. At his last visit we did increase his Risperdal to 1 mg in the afternoon and since then they have noticed that he has had increased drooling. He is sleeping at his workplace but not sleeping at night. Seems to have more anxiety as well. There is been more staff turnover at his current group home setting and so that is been a little bit problematic for him as well. I did advise him that the Risperdal can cause drooling and that we may wish to cut down on this dose and try something different to help with the anxiety symptoms. As he is sedate during the day I do not think the afternoon dose is ideal.  Staff member does express understanding of this. Patient otherwise has no other acute medical concerns. Did review patient's med list, allergies, social history, fam history, pmhx and pshx today as noted in the record. Review of Systems   Constitutional:  Negative for chills, fatigue and fever. HENT:          Increased drooliing   Psychiatric/Behavioral:  Positive for sleep disturbance. Negative for agitation. The patient is nervous/anxious and is hyperactive. Prior to Visit Medications    Medication Sig Taking?  Authorizing Provider   risperiDONE (RISPERDAL) 1 MG tablet 1 mg at night Yes Zoey Aspen, DO   mirtazapine (REMERON) 15 MG tablet Take 1 tablet by mouth nightly Yes Zoey Aspen, DO   atorvastatin (LIPITOR) 40 MG tablet TAKE 1 TABLET BY MOUTH DAILY Yes Zoey Aspen, DO   risperiDONE (RISPERDAL) 2 MG tablet TAKE 1 TABLET BY MOUTH DAILY Yes Zoey Aspen, DO   oxybutynin (DITROPAN-XL) 10 MG extended release tablet TAKE 1 TABLET BY MOUTH DAILY Yes Zoey Aspen, DO   polyethylene glycol (GLYCOLAX) 17 GM/SCOOP powder Use per bowel prep Yes Jean Reiding, DO   sodium chloride (ALTAMIST SPRAY) 0.65 % nasal spray Use 2 sprays in each nostril three times daily prn dryness Yes Rachele Diaz, DO   hydrocortisone (ANUSOL-HC) 2.5 % CREA rectal cream Apply to perirectal area as needed for itching or irritation Yes Tai Chimes, DO   GLUCOCARD VITAL TEST strip TEST ONCE DAILY Yes Tai Chimes, DO        Social History     Tobacco Use    Smoking status: Never    Smokeless tobacco: Never   Substance Use Topics    Alcohol use: No        Vitals:    09/09/22 0805   BP: 104/60   Site: Left Upper Arm   Position: Sitting   Cuff Size: Medium Adult   Pulse: 76   Resp: 16   Temp: 98.4 °F (36.9 °C)   TempSrc: Tympanic   SpO2: 98%   Weight: 127 lb (57.6 kg)   Height: 5' 7\" (1.702 m)     Estimated body mass index is 19.89 kg/m² as calculated from the following:    Height as of this encounter: 5' 7\" (1.702 m). Weight as of this encounter: 127 lb (57.6 kg). Physical Exam  Vitals and nursing note reviewed. Constitutional:       General: He is not in acute distress. Appearance: He is well-developed. He is not diaphoretic. HENT:      Head: Normocephalic and atraumatic. Right Ear: Tympanic membrane normal.      Left Ear: Tympanic membrane normal.      Nose: Nose normal. No congestion or rhinorrhea. Mouth/Throat:      Mouth: Mucous membranes are moist.      Pharynx: Oropharynx is clear. No oropharyngeal exudate or posterior oropharyngeal erythema. Eyes:      Extraocular Movements: Extraocular movements intact. Conjunctiva/sclera: Conjunctivae normal.      Pupils: Pupils are equal, round, and reactive to light. Cardiovascular:      Rate and Rhythm: Normal rate and regular rhythm. Pulmonary:      Effort: Pulmonary effort is normal.      Breath sounds: Normal breath sounds. No wheezing, rhonchi or rales. Musculoskeletal:      Cervical back: Normal range of motion. Skin:     General: Skin is warm and dry.       Coloration: Skin is not pale. Findings: No erythema or rash. Neurological:      Mental Status: He is alert and oriented to person, place, and time. Psychiatric:         Mood and Affect: Mood is anxious. Speech: He is noncommunicative (due to autism). Behavior: Behavior normal.         Thought Content: Thought content normal.         Judgment: Judgment normal.       ASSESSMENT/PLAN:    Encounter Diagnoses   Name Primary? Drooling Yes    Side effect of medication     Anxiety     Insomnia due to other mental disorder     Autism      Orders Placed This Encounter   Medications    risperiDONE (RISPERDAL) 1 MG tablet     Si mg at night     Dispense:  30 tablet     Refill:  5    mirtazapine (REMERON) 15 MG tablet     Sig: Take 1 tablet by mouth nightly     Dispense:  30 tablet     Refill:  5     No orders of the defined types were placed in this encounter. Will cut back to his previous dose of Risperdal as he was doing relatively well with this dosing and I do think the afternoon dose has made him have more issues with side effects and sedation. We will add Remeron at night to see if this will help with the anxiety and help with his sleep patterns. If we do not see improvement with this med dosing change then would suggest that he see a psychiatrist for continued med management. Patient is to continue on the rest of his current medical regimen. No additional changes are made at this time. Patient is to return to my office as scheduled in December or sooner if any further problems or symptoms arise. (Please note that portions of this note were completed with a voice recognition program. Efforts were made to edit the dictations but occasionally words are mis-transcribed.)    No follow-ups on file. An electronic signature was used to authenticate this note.     --Henry Rai DO on 2022 at 8:29 AM

## 2022-10-25 RX ORDER — RISPERIDONE 2 MG/1
TABLET ORAL
Qty: 30 TABLET | Refills: 5 | Status: SHIPPED | OUTPATIENT
Start: 2022-10-25 | End: 2022-11-30 | Stop reason: ALTCHOICE

## 2022-10-25 NOTE — TELEPHONE ENCOUNTER
Agusto Lunsford called requesting a refill of the below medication which has been pended for you:     Requested Prescriptions     Pending Prescriptions Disp Refills    risperiDONE (RISPERDAL) 2 MG tablet [Pharmacy Med Name: RISPERIDONE 2 MG TABS 2 Tablet] 28 tablet 5     Sig: TAKE 1 TABLET BY MOUTH DAILY       Last Appointment Date: 9/9/2022  Next Appointment Date: 12/22/2022    No Known Allergies

## 2022-11-30 ENCOUNTER — IMMUNIZATION (OUTPATIENT)
Dept: LAB | Age: 47
End: 2022-11-30
Payer: MEDICARE

## 2022-11-30 ENCOUNTER — OFFICE VISIT (OUTPATIENT)
Dept: FAMILY MEDICINE CLINIC | Age: 47
End: 2022-11-30
Payer: MEDICARE

## 2022-11-30 VITALS
RESPIRATION RATE: 18 BRPM | HEIGHT: 67 IN | TEMPERATURE: 97.7 F | HEART RATE: 108 BPM | WEIGHT: 130.9 LBS | OXYGEN SATURATION: 98 % | DIASTOLIC BLOOD PRESSURE: 80 MMHG | BODY MASS INDEX: 20.55 KG/M2 | SYSTOLIC BLOOD PRESSURE: 124 MMHG

## 2022-11-30 DIAGNOSIS — F99 INSOMNIA DUE TO OTHER MENTAL DISORDER: Primary | ICD-10-CM

## 2022-11-30 DIAGNOSIS — F51.05 INSOMNIA DUE TO OTHER MENTAL DISORDER: Primary | ICD-10-CM

## 2022-11-30 DIAGNOSIS — Z23 NEED FOR VACCINATION: ICD-10-CM

## 2022-11-30 DIAGNOSIS — H61.23 BILATERAL IMPACTED CERUMEN: ICD-10-CM

## 2022-11-30 DIAGNOSIS — J06.9 VIRAL URI: ICD-10-CM

## 2022-11-30 DIAGNOSIS — F84.0 AUTISM: ICD-10-CM

## 2022-11-30 PROCEDURE — G8420 CALC BMI NORM PARAMETERS: HCPCS | Performed by: FAMILY MEDICINE

## 2022-11-30 PROCEDURE — 69210 REMOVE IMPACTED EAR WAX UNI: CPT | Performed by: FAMILY MEDICINE

## 2022-11-30 PROCEDURE — 99214 OFFICE O/P EST MOD 30 MIN: CPT | Performed by: FAMILY MEDICINE

## 2022-11-30 PROCEDURE — PBSHW COVID-19, MODERNA BIVALENT BOOSTER, (AGE 12Y+), IM, 50 MCG/0.5 ML: Performed by: FAMILY MEDICINE

## 2022-11-30 PROCEDURE — 91313 COVID-19, MODERNA BIVALENT BOOSTER, (AGE 12Y+), IM, 50 MCG/0.5 ML: CPT | Performed by: FAMILY MEDICINE

## 2022-11-30 PROCEDURE — PBSHW INFLUENZA, AFLURIA QUADV, (AGE 3 YRS+), IM, PF, 0.5ML: Performed by: FAMILY MEDICINE

## 2022-11-30 PROCEDURE — G0008 ADMIN INFLUENZA VIRUS VAC: HCPCS | Performed by: FAMILY MEDICINE

## 2022-11-30 PROCEDURE — 1036F TOBACCO NON-USER: CPT | Performed by: FAMILY MEDICINE

## 2022-11-30 PROCEDURE — G8427 DOCREV CUR MEDS BY ELIG CLIN: HCPCS | Performed by: FAMILY MEDICINE

## 2022-11-30 PROCEDURE — 99213 OFFICE O/P EST LOW 20 MIN: CPT | Performed by: FAMILY MEDICINE

## 2022-11-30 PROCEDURE — G8482 FLU IMMUNIZE ORDER/ADMIN: HCPCS | Performed by: FAMILY MEDICINE

## 2022-11-30 RX ORDER — QUETIAPINE 300 MG/1
300 TABLET, FILM COATED, EXTENDED RELEASE ORAL NIGHTLY
Qty: 30 TABLET | Refills: 5 | Status: SHIPPED | OUTPATIENT
Start: 2022-11-30

## 2022-11-30 ASSESSMENT — ENCOUNTER SYMPTOMS
SINUS PRESSURE: 0
VOMITING: 0
WHEEZING: 0
SHORTNESS OF BREATH: 0
EYE DISCHARGE: 0
SORE THROAT: 0
NAUSEA: 0
DIARRHEA: 0
EYE REDNESS: 0
COUGH: 1
RHINORRHEA: 0
ABDOMINAL PAIN: 0
CONSTIPATION: 0
TROUBLE SWALLOWING: 0

## 2022-11-30 NOTE — PROGRESS NOTES
2022     Joe Dugan (:  1975) is a 50 y.o. male, here for evaluation of the following medical concerns:    HPI  Patient comes in today with his caregiver for evaluation for ongoing issues with insomnia and continued restlessness. Patient does have known autism and as a result is constantly moving and has a hard time settling down and sitting still. Caregiver notes that he also is not doing as well with their outings as he typically had been doing pretty well doing some outings through his group home but now just does not even really seem to be tolerating this well. He is up and down throughout the night and not sleeping well. Just seems very unsettled. He has been on Risperdal for quite some time and we did add Remeron but it does not seem to be helping with his sleep patterns. With this in mind I would switch his Risperdal to Seroquel XR and see if this provides him with more improvement as far as his overall restlessness and insomnia. They are planning on getting him into a psychiatrist through recovery services so I do think this is advisable to help with med management for his psychiatric condition but in the interim we will try to give him a comparable dose of Seroquel to see if this provides any improvement in the interim. He has been having some respiratory symptoms over the last 5 to 6 days. Seems to be improving. Was just having some cough and chest congestion. No fever. Some sinus congestion and drainage. Had not really seem to have issues with sore throat. In general she states that his caregiver that has been within the last couple of days states that he is gotten better just has a lot of phlegm. Otherwise no other acute issues. Did review patient's med list, allergies, social history, fam history, pmhx and pshx today as noted in the record. Review of Systems   Constitutional:  Negative for chills, fatigue and fever. HENT:  Positive for congestion.  Negative for ear pain, postnasal drip, rhinorrhea, sinus pressure, sore throat and trouble swallowing. Eyes:  Negative for discharge and redness. Respiratory:  Positive for cough. Negative for shortness of breath and wheezing. Cardiovascular:  Negative for chest pain. Gastrointestinal:  Negative for abdominal pain, constipation, diarrhea, nausea and vomiting. Genitourinary:  Negative for dysuria, flank pain, frequency and urgency. Musculoskeletal:  Negative for arthralgias, myalgias and neck pain. Skin:  Negative for rash and wound. Allergic/Immunologic: Negative for environmental allergies. Neurological:  Negative for dizziness, weakness, light-headedness and headaches. Hematological:  Negative for adenopathy. Psychiatric/Behavioral:  Positive for sleep disturbance. The patient is nervous/anxious. Restless and unable to sit still. Constantly pacing     Prior to Visit Medications    Medication Sig Taking?  Authorizing Provider   risperiDONE (RISPERDAL) 2 MG tablet TAKE 1 TABLET BY MOUTH DAILY  Cabrera Judy, DO   risperiDONE (RISPERDAL) 1 MG tablet 1 mg at night  Cabreraair Kim, DO   mirtazapine (REMERON) 15 MG tablet Take 1 tablet by mouth nightly  Cabrera Pucker, DO   atorvastatin (LIPITOR) 40 MG tablet TAKE 1 TABLET BY MOUTH DAILY  Cabrera Pucbarney, DO   oxybutynin (DITROPAN-XL) 10 MG extended release tablet TAKE 1 TABLET BY MOUTH DAILY  Cabrera Pucbarney, DO   polyethylene glycol (GLYCOLAX) 17 GM/SCOOP powder Use per bowel prep  Dottie Beebe,    sodium chloride (ALTAMIST SPRAY) 0.65 % nasal spray Use 2 sprays in each nostril three times daily prn dryness  Rachele Diaz DO   hydrocortisone (ANUSOL-HC) 2.5 % CREA rectal cream Apply to perirectal area as needed for itching or irritation  Cabreraair Kim, DO   GLUCOCARD VITAL TEST strip TEST ONCE DAILY  Cabreraair Kim, DO        Social History     Tobacco Use    Smoking status: Never    Smokeless tobacco: Never Substance Use Topics    Alcohol use: No        There were no vitals filed for this visit. Estimated body mass index is 19.89 kg/m² as calculated from the following:    Height as of 9/9/22: 5' 7\" (1.702 m). Weight as of 9/9/22: 127 lb (57.6 kg). Physical Exam  Vitals and nursing note reviewed. Constitutional:       General: He is not in acute distress. Appearance: He is well-developed. He is not diaphoretic. HENT:      Head: Normocephalic and atraumatic. Right Ear: There is impacted cerumen. Left Ear: There is impacted cerumen. Ears:      Comments: Cerumen impaction in bilateral ears     Nose: No congestion or rhinorrhea. Mouth/Throat:      Mouth: Mucous membranes are moist.      Comments: Post nasal drainage noted. Eyes:      Conjunctiva/sclera: Conjunctivae normal.   Cardiovascular:      Rate and Rhythm: Normal rate and regular rhythm. Pulmonary:      Effort: Pulmonary effort is normal.      Breath sounds: Normal breath sounds. No wheezing, rhonchi or rales. Musculoskeletal:      Cervical back: Normal range of motion. Lymphadenopathy:      Cervical: No cervical adenopathy. Skin:     General: Skin is warm and dry. Coloration: Skin is not pale. Findings: No erythema or rash. Neurological:      Mental Status: He is alert and oriented to person, place, and time. Psychiatric:         Behavior: Behavior normal.         Thought Content: Thought content normal.         Judgment: Judgment normal.      Comments: Constantly pacing in the room. Needs frequent redirection. ASSESSMENT/PLAN:  Encounter Diagnoses   Name Primary?     Insomnia due to other mental disorder Yes    Autism     Bilateral impacted cerumen     Viral URI      Orders Placed This Encounter   Medications    QUEtiapine (SEROQUEL XR) 300 MG extended release tablet     Sig: Take 1 tablet by mouth nightly     Dispense:  30 tablet     Refill:  5     No orders of the defined types were placed in this encounter. We will discontinue the Remeron and Risperdal dosing and switch him to Seroquel XR. I think this will carry him throughout the whole day and hopefully will help him rest better at night. He is just very restless related to his autism and I do think seeing a psychiatrist is advisable at this point. With regards to the upper respiratory symptoms seems to be improving overall. Would just treat symptomatically at this point. Ear Cerumen Removal Procedure Note    Indication: ear cerumen impaction, unable to visualize tympanic membrane, and hearing loss    Procedure: After placing the patient's head in the appropriate position, the patient's right ear canal was irrigated with the appropriate solution and curetted until all cerumen was removed and the ear canal was clear. The other ear canal also had cerumen present and was irrigated with the appropriate solution and curetted until all cerumen was removed and the ear canal was clear. At this point the procedure was complete. The patient tolerated the procedure well. Complications: None    Patient will be given his COVID booster and influenza vaccine today. Patient is to continue on the rest of his current medical therapy. No additional changes are made at this time. Patient is to return to my office as scheduled for his routine medical follow-up visit or sooner if any further problems or symptoms arise. (Please note that portions of this note were completed with a voice recognition program. Efforts were made to edit the dictations but occasionally words are mis-transcribed.)          No follow-ups on file. An electronic signature was used to authenticate this note.     --Hayden Scheuermann,  on 11/30/2022 at 7:08 AM

## 2022-12-05 ENCOUNTER — TELEPHONE (OUTPATIENT)
Dept: FAMILY MEDICINE CLINIC | Age: 47
End: 2022-12-05

## 2022-12-08 ENCOUNTER — HOSPITAL ENCOUNTER (OUTPATIENT)
Age: 47
Discharge: HOME | End: 2022-12-08
Payer: MEDICARE

## 2022-12-08 DIAGNOSIS — E78.2 MIXED HYPERLIPIDEMIA: ICD-10-CM

## 2022-12-08 DIAGNOSIS — E11.9 CONTROLLED TYPE 2 DIABETES MELLITUS WITHOUT COMPLICATION, WITHOUT LONG-TERM CURRENT USE OF INSULIN (HCC): ICD-10-CM

## 2022-12-08 LAB
ABSOLUTE EOS #: 0.26 K/UL (ref 0–0.44)
ABSOLUTE IMMATURE GRANULOCYTE: <0.03 K/UL (ref 0–0.3)
ABSOLUTE LYMPH #: 1.86 K/UL (ref 1.1–3.7)
ABSOLUTE MONO #: 0.4 K/UL (ref 0.1–1.2)
ALBUMIN SERPL-MCNC: 4 G/DL (ref 3.5–5.2)
ALBUMIN/GLOBULIN RATIO: 1.3 (ref 1–2.5)
ALP BLD-CCNC: 43 U/L (ref 40–129)
ALT SERPL-CCNC: 20 U/L (ref 5–41)
ANION GAP SERPL CALCULATED.3IONS-SCNC: 9 MMOL/L (ref 9–17)
AST SERPL-CCNC: 24 U/L
BASOPHILS # BLD: 1 % (ref 0–2)
BASOPHILS ABSOLUTE: 0.06 K/UL (ref 0–0.2)
BILIRUB SERPL-MCNC: 0.4 MG/DL (ref 0.3–1.2)
BUN BLDV-MCNC: 16 MG/DL (ref 6–20)
BUN/CREAT BLD: 28 (ref 9–20)
CALCIUM SERPL-MCNC: 9.9 MG/DL (ref 8.6–10.4)
CHLORIDE BLD-SCNC: 103 MMOL/L (ref 98–107)
CHOLESTEROL/HDL RATIO: 2.4
CHOLESTEROL: 123 MG/DL
CO2: 29 MMOL/L (ref 20–31)
CREAT SERPL-MCNC: 0.57 MG/DL (ref 0.7–1.2)
EOSINOPHILS RELATIVE PERCENT: 4 % (ref 1–4)
ESTIMATED AVERAGE GLUCOSE: 120 MG/DL
GFR SERPL CREATININE-BSD FRML MDRD: >60 ML/MIN/1.73M2
GLUCOSE BLD-MCNC: 99 MG/DL (ref 70–99)
HBA1C MFR BLD: 5.8 % (ref 4–6)
HCT VFR BLD CALC: 40.3 % (ref 40.7–50.3)
HDLC SERPL-MCNC: 52 MG/DL
HEMOGLOBIN: 12.9 G/DL (ref 13–17)
IMMATURE GRANULOCYTES: 0 %
LDL CHOLESTEROL: 61 MG/DL (ref 0–130)
LYMPHOCYTES # BLD: 30 % (ref 24–43)
MCH RBC QN AUTO: 30.3 PG (ref 25.2–33.5)
MCHC RBC AUTO-ENTMCNC: 32 G/DL (ref 25.2–33.5)
MCV RBC AUTO: 94.6 FL (ref 82.6–102.9)
MONOCYTES # BLD: 6 % (ref 3–12)
NRBC AUTOMATED: 0 PER 100 WBC
PDW BLD-RTO: 12.8 % (ref 11.8–14.4)
PLATELET # BLD: 288 K/UL (ref 138–453)
PMV BLD AUTO: 8.6 FL (ref 8.1–13.5)
POTASSIUM SERPL-SCNC: 4.1 MMOL/L (ref 3.7–5.3)
RBC # BLD: 4.26 M/UL (ref 4.21–5.77)
SEG NEUTROPHILS: 59 % (ref 36–65)
SEGMENTED NEUTROPHILS ABSOLUTE COUNT: 3.71 K/UL (ref 1.5–8.1)
SODIUM BLD-SCNC: 141 MMOL/L (ref 135–144)
TOTAL PROTEIN: 7.2 G/DL (ref 6.4–8.3)
TRIGL SERPL-MCNC: 51 MG/DL
WBC # BLD: 6.3 K/UL (ref 3.5–11.3)

## 2022-12-08 PROCEDURE — 36415 COLL VENOUS BLD VENIPUNCTURE: CPT

## 2022-12-08 PROCEDURE — 83036 HEMOGLOBIN GLYCOSYLATED A1C: CPT

## 2022-12-08 PROCEDURE — 80061 LIPID PANEL: CPT

## 2022-12-08 PROCEDURE — 80053 COMPREHEN METABOLIC PANEL: CPT

## 2022-12-08 PROCEDURE — 85025 COMPLETE CBC W/AUTO DIFF WBC: CPT

## 2023-01-03 ENCOUNTER — OFFICE VISIT (OUTPATIENT)
Dept: FAMILY MEDICINE CLINIC | Age: 48
End: 2023-01-03
Payer: MEDICARE

## 2023-01-03 VITALS
HEIGHT: 67 IN | HEART RATE: 112 BPM | RESPIRATION RATE: 18 BRPM | TEMPERATURE: 96.7 F | OXYGEN SATURATION: 98 % | WEIGHT: 135.1 LBS | DIASTOLIC BLOOD PRESSURE: 70 MMHG | BODY MASS INDEX: 21.2 KG/M2 | SYSTOLIC BLOOD PRESSURE: 110 MMHG

## 2023-01-03 DIAGNOSIS — T50.905A ADVERSE EFFECT OF DRUG, INITIAL ENCOUNTER: ICD-10-CM

## 2023-01-03 DIAGNOSIS — F84.0 AUTISM: ICD-10-CM

## 2023-01-03 DIAGNOSIS — Z00.00 MEDICARE ANNUAL WELLNESS VISIT, SUBSEQUENT: ICD-10-CM

## 2023-01-03 DIAGNOSIS — E78.2 MIXED HYPERLIPIDEMIA: ICD-10-CM

## 2023-01-03 DIAGNOSIS — F51.05 INSOMNIA DUE TO OTHER MENTAL DISORDER: ICD-10-CM

## 2023-01-03 DIAGNOSIS — F99 INSOMNIA DUE TO OTHER MENTAL DISORDER: ICD-10-CM

## 2023-01-03 DIAGNOSIS — F45.8 TEETH GRINDING: Primary | ICD-10-CM

## 2023-01-03 DIAGNOSIS — E11.9 CONTROLLED TYPE 2 DIABETES MELLITUS WITHOUT COMPLICATION, WITHOUT LONG-TERM CURRENT USE OF INSULIN (HCC): ICD-10-CM

## 2023-01-03 PROCEDURE — 1036F TOBACCO NON-USER: CPT | Performed by: FAMILY MEDICINE

## 2023-01-03 PROCEDURE — G0439 PPPS, SUBSEQ VISIT: HCPCS | Performed by: FAMILY MEDICINE

## 2023-01-03 PROCEDURE — G8482 FLU IMMUNIZE ORDER/ADMIN: HCPCS | Performed by: FAMILY MEDICINE

## 2023-01-03 PROCEDURE — 3046F HEMOGLOBIN A1C LEVEL >9.0%: CPT | Performed by: FAMILY MEDICINE

## 2023-01-03 PROCEDURE — G8427 DOCREV CUR MEDS BY ELIG CLIN: HCPCS | Performed by: FAMILY MEDICINE

## 2023-01-03 PROCEDURE — G8420 CALC BMI NORM PARAMETERS: HCPCS | Performed by: FAMILY MEDICINE

## 2023-01-03 PROCEDURE — 2022F DILAT RTA XM EVC RTNOPTHY: CPT | Performed by: FAMILY MEDICINE

## 2023-01-03 PROCEDURE — 99214 OFFICE O/P EST MOD 30 MIN: CPT | Performed by: FAMILY MEDICINE

## 2023-01-03 RX ORDER — OLANZAPINE 10 MG/1
10 TABLET ORAL NIGHTLY
Qty: 30 TABLET | Refills: 2 | Status: SHIPPED | OUTPATIENT
Start: 2023-01-03

## 2023-01-03 ASSESSMENT — PATIENT HEALTH QUESTIONNAIRE - PHQ9
SUM OF ALL RESPONSES TO PHQ QUESTIONS 1-9: 0
1. LITTLE INTEREST OR PLEASURE IN DOING THINGS: 0
SUM OF ALL RESPONSES TO PHQ9 QUESTIONS 1 & 2: 0
2. FEELING DOWN, DEPRESSED OR HOPELESS: 0
SUM OF ALL RESPONSES TO PHQ QUESTIONS 1-9: 0

## 2023-01-03 ASSESSMENT — ENCOUNTER SYMPTOMS
VOMITING: 0
SHORTNESS OF BREATH: 0
CONSTIPATION: 0
TROUBLE SWALLOWING: 0
WHEEZING: 0
NAUSEA: 0
EYE REDNESS: 0
ABDOMINAL PAIN: 0
DIARRHEA: 0
COUGH: 0
SINUS PRESSURE: 0
SORE THROAT: 0
EYE DISCHARGE: 0
RHINORRHEA: 0

## 2023-01-03 ASSESSMENT — LIFESTYLE VARIABLES
HOW OFTEN DO YOU HAVE A DRINK CONTAINING ALCOHOL: NEVER
HOW MANY STANDARD DRINKS CONTAINING ALCOHOL DO YOU HAVE ON A TYPICAL DAY: PATIENT DOES NOT DRINK

## 2023-01-03 NOTE — PROGRESS NOTES
Medicare Annual Wellness Visit    Silvina Garner is here for Medicare AWV, Diabetes, Hyperlipidemia, Other (autism), Insomnia (Doing better but has developed teeth grinding), and Discuss Labs (Drawn 12/8/2022)    Assessment & Plan   Autism  Controlled type 2 diabetes mellitus without complication, without long-term current use of insulin ([facility] [address]  -     Comprehensive Metabolic Panel; Future  -     CBC with Auto Differential; Future  -     Hemoglobin A1C; Future  Mixed hyperlipidemia  -     Lipid Panel; Future  Insomnia due to other mental disorder  Medicare annual wellness visit, subsequent      Recommendations for Preventive Services Due: see orders and patient instructions/AVS.  Recommended screening schedule for the next 5-10 years is provided to the patient in written form: see Patient Instructions/AVS.     Return for Medicare Annual Wellness Visit in 1 year. Subjective       Patient's complete Health Risk Assessment and screening values have been reviewed and are found in Flowsheets. The following problems were reviewed today and where indicated follow up appointments were made and/or referrals ordered.     Positive Risk Factor Screenings with Interventions:                      ADL's:   Patient reports needing help with:  Select all that apply: (!) Bathing, Toileting  Select all that apply: [employer], Housekeeping, Banking/Finances, Shopping, Food Preparation, Telephone Use, Transportation, Taking Medications  Interventions:  Is in a group home with a guardian who assists with the above activities    Advanced Directives:  Do you have a Living Will?: (!) No    Intervention:  Has guardian who makes decisions for patient                       Objective   Vitals:    01/03/23 0757   BP: 110/70   Site: Left Upper Arm   Position: Sitting   Cuff Size: Medium Adult   Pulse: (!) 112   Resp: 18   Temp: (!) 96.7 °F (35.9 °C)   TempSrc: Tympanic   SpO2: 98%   Weight: 135 lb 1.6 oz (61.3 kg)   Height: 5' 7\" (1.702 m) Body mass index is 21.16 kg/m². No Known Allergies  Prior to Visit Medications    Medication Sig Taking?  Authorizing Provider   OLANZapine (ZYPREXA) 10 MG tablet Take 1 tablet by mouth nightly Yes Meredith Anderson DO   QUEtiapine (SEROQUEL XR) 300 MG extended release tablet Take 1 tablet by mouth nightly Yes Meredith Anderson DO   atorvastatin (LIPITOR) 40 MG tablet TAKE 1 TABLET BY MOUTH DAILY Yes Meredith Anderson DO   oxybutynin (DITROPAN-XL) 10 MG extended release tablet TAKE 1 TABLET BY MOUTH DAILY Yes Rachele Diaz DO   sodium chloride (ALTAMIST SPRAY) 0.65 % nasal spray Use 2 sprays in each nostril three times daily prn dryness Yes Meredith Anderson DO   hydrocortisone (ANUSOL-HC) 2.5 % CREA rectal cream Apply to perirectal area as needed for itching or irritation Yes Meredith Anderson DO   GLUCOCARD VITAL TEST strip TEST ONCE DAILY Yes Meredith Anderson DO       CareTeam (Including outside providers/suppliers regularly involved in providing care):   Patient Care Team:  Meredith Anderson DO as PCP - General (Family Medicine)  Meredith Anderson DO as PCP - [facility] Empaneled Provider     Reviewed and updated this visit:  Tobacco  Allergies  Meds  Med Hx  Surg Hx  Soc Hx  Fam Hx

## 2023-01-03 NOTE — Clinical Note
Can you contact his caregiver and schedule him for 6 months. Since we did his annual wellness visit today the [facility] put to have him come back in a year and they just scheduled him for a year instead of his routine 6 month follow up visit.

## 2023-01-03 NOTE — PROGRESS NOTES
1/3/2023     Juan M Montero (:  1975) is a 50 y.o. male, here for evaluation of the following medical concerns:    Diabetes  Pertinent negatives for hypoglycemia include no dizziness or headaches. Pertinent negatives for diabetes include no chest pain, no fatigue and no weakness. Hyperlipidemia  Pertinent negatives include no chest pain, myalgias or shortness of breath. Other  Pertinent negatives include no abdominal pain, arthralgias, chest pain, chills, congestion, coughing, fatigue, fever, headaches, myalgias, nausea, neck pain, rash, sore throat, vomiting or weakness. Insomnia  Pertinent negatives include no abdominal pain, arthralgias, chest pain, chills, congestion, coughing, fatigue, fever, headaches, myalgias, nausea, neck pain, rash, sore throat, vomiting or weakness. Patient comes in today for follow up of chronic health issues Patient has been doing much better with the addition of the Seroquel XR although about a week after he started he started grinding his teeth. Today in the office it is quite apparent that he is having significant teeth grinding. He otherwise is sleeping much better and is not pacing as much throughout the day. Seems to be much more subtle but with the side effect of the teeth grinding I did advised his caregiver that we will need to change his medication to see if we can prevent this side effect. I did recommend again that they get into see the psychiatrist.  She states that they were going to but after we had gave him medicine that was helping with some of the behavioral issues he was having at home they thought perhaps they would need to see the psychiatrist.  Again I did recommend with his known history of autism and some of the behaviors associated with that I would recommend seeing the psychiatrist to make sure that we are appropriately managing him with medical therapy. I will switch his medication to Zyprexa to see if this provides him with benefit.   I did advise his caregiver to really watch for any side effects and if he has any of these types of side effects such as involuntary movements or teeth grinding or other secondary issues he should stop the medication right away and let me know. They are made aware of this. He does have a known history of diabetes mellitus type 2 which is stable and controlled with his current dietary regimen. Does have known hyperlipidemia and his cholesterol levels are stable and controlled on his current medical therapy. Otherwise today no other acute medical concerns. .  Patient's recent lab reports are as follows:    Results for orders placed or performed during the hospital encounter of 12/08/22   Lipid Panel   Result Value Ref Range    Cholesterol 123 <200 mg/dL    HDL 52 >40 mg/dL    LDL Cholesterol 61 0 - 130 mg/dL    Chol/HDL Ratio 2.4 <5    Triglycerides 51 <150 mg/dL   Hemoglobin A1C   Result Value Ref Range    Hemoglobin A1C 5.8 4.0 - 6.0 %    Estimated Avg Glucose 120 mg/dL   Comprehensive Metabolic Panel   Result Value Ref Range    Glucose 99 70 - 99 mg/dL    BUN 16 6 - 20 mg/dL    Creatinine 0.57 (L) 0.70 - 1.20 mg/dL    Est, Glom Filt Rate >60 >60 mL/min/1.73m2    Bun/Cre Ratio 28 (H) 9 - 20    Calcium 9.9 8.6 - 10.4 mg/dL    Sodium 141 135 - 144 mmol/L    Potassium 4.1 3.7 - 5.3 mmol/L    Chloride 103 98 - 107 mmol/L    CO2 29 20 - 31 mmol/L    Anion Gap 9 9 - 17 mmol/L    Alkaline Phosphatase 43 40 - 129 U/L    ALT 20 5 - 41 U/L    AST 24 <40 U/L    Total Bilirubin 0.4 0.3 - 1.2 mg/dL    Total Protein 7.2 6.4 - 8.3 g/dL    Albumin 4.0 3.5 - 5.2 g/dL    Albumin/Globulin Ratio 1.3 1.0 - 2.5   CBC with Auto Differential   Result Value Ref Range    WBC 6.3 3.5 - 11.3 k/uL    RBC 4.26 4.21 - 5.77 m/uL    Hemoglobin 12.9 (L) 13.0 - 17.0 g/dL    Hematocrit 40.3 (L) 40.7 - 50.3 %    MCV 94.6 82.6 - 102.9 fL    MCH 30.3 25.2 - 33.5 pg    MCHC 32.0 25.2 - 33.5 g/dL    RDW 12.8 11.8 - 14.4 %    Platelets 148 731 - 162 k/uL MPV 8.6 8.1 - 13.5 fL    NRBC Automated 0.0 0.0 per 100 WBC    Seg Neutrophils 59 36 - 65 %    Lymphocytes 30 24 - 43 %    Monocytes 6 3 - 12 %    Eosinophils % 4 1 - 4 %    Basophils 1 0 - 2 %    Immature Granulocytes 0 0 %    Segs Absolute 3.71 1.50 - 8.10 k/uL    Absolute Lymph # 1.86 1.10 - 3.70 k/uL    Absolute Mono # 0.40 0.10 - 1.20 k/uL    Absolute Eos # 0.26 0.00 - 0.44 k/uL    Basophils Absolute 0.06 0.00 - 0.20 k/uL    Absolute Immature Granulocyte <0.03 0.00 - 0.30 k/uL      Other review of systems are as noted below. Preventative measures are reviewed today. See health maintenance section for complete preventative plan of care. Did review patient's med list, allergies, social history, fam history, pmhx and pshx today as noted in the record. Review of Systems   Constitutional:  Negative for chills, fatigue and fever. HENT:  Negative for congestion, ear pain, postnasal drip, rhinorrhea, sinus pressure, sore throat and trouble swallowing. Eyes:  Negative for discharge and redness. Respiratory:  Negative for cough, shortness of breath and wheezing. Cardiovascular:  Negative for chest pain. Gastrointestinal:  Negative for abdominal pain, constipation, diarrhea, nausea and vomiting. Genitourinary:  Negative for dysuria, flank pain, frequency and urgency. Musculoskeletal:  Negative for arthralgias, myalgias and neck pain. Skin:  Negative for rash and wound. Allergic/Immunologic: Negative for environmental allergies. Neurological:  Negative for dizziness, weakness, light-headedness and headaches. Teeth grinding   Hematological:  Negative for adenopathy. Psychiatric/Behavioral:  The patient has insomnia. Prior to Visit Medications    Medication Sig Taking?  Authorizing Provider   OLANZapine (ZYPREXA) 10 MG tablet Take 1 tablet by mouth nightly Yes Anjel Neighbours, DO   QUEtiapine (SEROQUEL XR) 300 MG extended release tablet Take 1 tablet by mouth nightly Yes Juan Antonio Correa DO   atorvastatin (LIPITOR) 40 MG tablet TAKE 1 TABLET BY MOUTH DAILY Yes Juan Antonio Correa DO   oxybutynin (DITROPAN-XL) 10 MG extended release tablet TAKE 1 TABLET BY MOUTH DAILY Yes Rachele Diaz DO   sodium chloride (ALTAMIST SPRAY) 0.65 % nasal spray Use 2 sprays in each nostril three times daily prn dryness Yes Juan Antonio Correa DO   hydrocortisone (ANUSOL-HC) 2.5 % CREA rectal cream Apply to perirectal area as needed for itching or irritation Yes Juan Antonio Correa DO   GLUCOCARD VITAL TEST strip TEST ONCE DAILY Yes Juan Antonio Correa DO        Social History     Tobacco Use    Smoking status: Never    Smokeless tobacco: Never   Substance Use Topics    Alcohol use: No        Vitals:    01/03/23 0757   BP: 110/70   Site: Left Upper Arm   Position: Sitting   Cuff Size: Medium Adult   Pulse: (!) 112   Resp: 18   Temp: (!) 96.7 °F (35.9 °C)   TempSrc: Tympanic   SpO2: 98%   Weight: 135 lb 1.6 oz (61.3 kg)   Height: 5' 7\" (1.702 m)     Estimated body mass index is 21.16 kg/m² as calculated from the following:    Height as of this encounter: 5' 7\" (1.702 m). Weight as of this encounter: 135 lb 1.6 oz (61.3 kg). Physical Exam  Vitals and nursing note reviewed. Constitutional:       General: He is not in acute distress. Appearance: Normal appearance. He is well-developed. He is not diaphoretic. HENT:      Head: Normocephalic and atraumatic. Right Ear: Tympanic membrane, ear canal and external ear normal.      Left Ear: Tympanic membrane, ear canal and external ear normal.      Nose: Nose normal.      Mouth/Throat:      Mouth: Mucous membranes are moist.      Pharynx: Oropharynx is clear. No oropharyngeal exudate. Eyes:      General:         Right eye: No discharge. Left eye: No discharge. Conjunctiva/sclera: Conjunctivae normal.      Pupils: Pupils are equal, round, and reactive to light. Neck:      Thyroid: No thyromegaly.    Cardiovascular:      Rate and Rhythm: Normal rate and regular rhythm. Heart sounds: Normal heart sounds. Pulmonary:      Effort: Pulmonary effort is normal.      Breath sounds: Normal breath sounds. No wheezing or rales. Abdominal:      General: Bowel sounds are normal. There is no distension. Palpations: Abdomen is soft. Tenderness: There is no abdominal tenderness. Musculoskeletal:      Cervical back: Normal range of motion and neck supple. Lymphadenopathy:      Cervical: No cervical adenopathy. Skin:     General: Skin is warm and dry. Findings: No rash. Neurological:      Mental Status: He is alert and oriented to person, place, and time. Comments: Audible teeth grinding today in the office   Psychiatric:         Behavior: Behavior normal.         Thought Content: Thought content normal.         Judgment: Judgment normal.         ASSESSMENT/PLAN:  Encounter Diagnoses   Name Primary? Teeth grinding Yes    Adverse effect of drug, initial encounter     Autism     Controlled type 2 diabetes mellitus without complication, without long-term current use of insulin ([facility] [address]     Mixed hyperlipidemia     Insomnia due to other mental disorder     Medicare annual wellness visit, subsequent      Orders Placed This Encounter   Medications    OLANZapine (ZYPREXA) 10 MG tablet     Sig: Take 1 tablet by mouth nightly     Dispense:  30 tablet     Refill:  2     Orders Placed This Encounter   Procedures    Comprehensive Metabolic Panel     Standing Status:   Future     Standing Expiration Date:   1/3/2024    CBC with Auto Differential     Standing Status:   Future     Standing Expiration Date:   1/3/2024    Hemoglobin A1C     Standing Status:   Future     Standing Expiration Date:   1/3/2024    Lipid Panel     Standing Status:   Future     Standing Expiration Date:   1/3/2024     Order Specific Question:   Is Patient Fasting?/# of Hours     Answer:   12 hours     We will have him stop the Seroquel and change him to Zyprexa. Did discuss with the caregiver today that they should monitor for any potential side effects of this medication if he does have any they should notify my office right away so we can make adjustments. Also recommended that they schedule with the psychiatrist for further med management of this issue. Patient is to continue on the rest of his current medical regimen. No additional changes are made at this time. Patient is to return to my office in 6 months duration or sooner if any further problems or symptoms arise. (Please note that portions of this note were completed with a voice recognition program. Efforts were made to edit the dictations but occasionally words are mis-transcribed.)        Return for Medicare Annual Wellness Visit in 1 year. An electronic signature was used to authenticate this note.     --Tucker Dixon, DO on 1/3/2023 at 8:17 AM

## 2023-01-03 NOTE — PATIENT INSTRUCTIONS
Learning About Activities of Daily Living  What are activities of daily living? Activities of daily living (ADLs) are the basic self-care tasks you do every day. As you age, and if you have health problems, you may find that it's harder to do these things for yourself. That's when you may need some help. Your doctor uses ADLs to measure how much help you need. Knowing what you can and can't do for yourself is an important first step to getting help. And when you have the help you need, you can stay as independent as possible. Your doctor will want to know if you are able to do tasks such as: Take a bath or shower without help. Go to the bathroom by yourself. Dress and undress without help. Shave, comb your hair, and brush teeth on your own. Get in and out of bed or a chair without help. Feed yourself without help. If you are having trouble doing basic self-care tasks, talk with your doctor. You may want to bring a caregiver or family member who can help the doctor understand your needs and abilities. How will a doctor assess your ADLs? Asking about ADLs is part of a routine health checkup your doctor will likely do as you age. Your health check might be done in a doctor's office, in your home, or at a hospital. The goal is to find out if you are having any problems that could make your health problems worse or that make it unsafe for you to be on your own. To measure your ADLs, your doctor will ask how hard it is for you to do routine tasks. He or she may also want to know if you have changed the way you do a task because of a health problem. He or she may watch how you:  Walk back and forth. Keep your balance while you stand or walk. Move from sitting to standing or from a bed to a chair. Button or unbutton a shirt or sweater. Remove and put on your shoes. It's normal to feel a little worried or anxious if you find you can't do all the things you used to be able to do.  Talking with your doctor about ADLs isn't a test that you either pass or fail. It's just a way to get more information about your health and safety. Follow-up care is a key part of your treatment and safety. Be sure to make and go to all appointments, and call your doctor if you are having problems. It's also a good idea to know your test results and keep a list of the medicines you take. Current as of: October 6, 2021               Content Version: 13.5  © 2006-2022 Healthwise, [employer]. Care instructions adapted under license by [facility] ([facility]). If you have questions about a medical condition or this instruction, always ask your healthcare professional. [address] any warranty or liability for your use of this information. Advance Directives: Care Instructions  Overview  An advance directive is a legal way to state your wishes at the end of your life. It tells your family and your doctor what to do if you can't say what you want. There are two main types of advance directives. You can change them any time your wishes change. Living will. This form tells your family and your doctor your wishes about life support and other treatment. The form is also called a declaration. Medical power of . This form lets you name a person to make treatment decisions for you when you can't speak for yourself. This person is called a health care agent (health care proxy, health care surrogate). The form is also called a durable power of  for health care. If you do not have an advance directive, decisions about your medical care may be made by a family member, or by a doctor or a  who doesn't know you. It may help to think of an advance directive as a gift to the people who care for you. If you have one, they won't have to make tough decisions by themselves.   For more information, including forms for your state, see the [address] website (www.caringinfo.org/planning/advance-directives/). Follow-up care is a key part of your treatment and safety. Be sure to make and go to all appointments, and call your doctor if you are having problems. It's also a good idea to know your test results and keep a list of the medicines you take. What should you include in an advance directive? Many states have a unique advance directive form. (It may ask you to address specific issues.) Or you might use a universal form that's approved by many states. If your form doesn't tell you what to address, it may be hard to know what to include in your advance directive. Use the questions below to help you get started. Who do you want to make decisions about your medical care if you are not able to? What life-support measures do you want if you have a serious illness that gets worse over time or can't be cured? What are you most afraid of that might happen? (Maybe you're afraid of having pain, losing your independence, or being kept alive by machines.)  Where would you prefer to die? (Your home? A hospital? A nursing home?)  Do you want to donate your organs when you die? Do you want certain Ep[employer] practices performed before you die? When should you call for help? Be sure to contact your doctor if you have any questions. Where can you learn more? Go to http://www.stone.com/ and enter R264 to learn more about \"Advance Directives: Care Instructions. \"  Current as of: June 16, 2022               Content Version: 13.5  © 7129-2728 Healthwise, [employer]. Care instructions adapted under license by [facility] [address] If you have questions about a medical condition or this instruction, always ask your healthcare professional. [place] [address] any warranty or liability for your use of this information. Personalized Preventive Plan for Precious Jimenezr - 1/3/2023  Medicare offers a range of preventive health benefits.  Some of the tests and screenings are paid in full while other may be subject to a deductible, co-insurance, and/or copay. Some of these benefits include a comprehensive review of your medical history including lifestyle, illnesses that may run in your family, and various assessments and screenings as appropriate. After reviewing your medical record and screening and assessments performed today your provider may have ordered immunizations, labs, imaging, and/or referrals for you. A list of these orders (if applicable) as well as your Preventive Care list are included within your After Visit Summary for your review. Other Preventive Recommendations:    A preventive eye exam performed by an eye specialist is recommended every 1-2 years to screen for glaucoma; cataracts, macular degeneration, and other eye disorders. A preventive dental visit is recommended every 6 months. Try to get at least 150 minutes of exercise per week or 10,000 steps per day on a pedometer . Order or download the FREE \"Exercise & Physical Activity: Your Everyday Guide\" from [employer]. Call 6-796.311.8314 or search [employer] online. You need 3570-4100 mg of calcium and 0491-2224 IU of vitamin D per day. It is possible to meet your calcium requirement with diet alone, but a vitamin D supplement is usually necessary to meet this goal.  When exposed to the sun, use a sunscreen that protects against both UVA and UVB radiation with an SPF of 30 or greater. Reapply every 2 to 3 hours or after sweating, drying off with a towel, or swimming. Always wear a seat belt when traveling in a car. Always wear a helmet when riding a bicycle or motorcycle.

## 2023-01-18 RX ORDER — QUETIAPINE 300 MG/1
TABLET, FILM COATED, EXTENDED RELEASE ORAL
Qty: 28 TABLET | Refills: 1 | OUTPATIENT
Start: 2023-01-18

## 2023-01-18 NOTE — TELEPHONE ENCOUNTER
Umesh Lopez called requesting a refill of the below medication which has been pended for you: script was sent 11/30/2022    Requested Prescriptions     Pending Prescriptions Disp Refills    QUEtiapine (SEROQUEL XR) 300 MG extended release tablet [Pharmacy Med Name: QUETIAPINE FUMARATE  300 Tablet] 28 tablet 1     Sig: TAKE ONE TABLET AT BEDTIME       Last Appointment Date: 1/3/2023  Next Appointment Date: 7/5/2023    No Known Allergies

## 2023-01-31 NOTE — PATIENT INSTRUCTIONS
Hospital Outpatient Visit on 05/17/2021   Component Date Value Ref Range Status    WBC 05/17/2021 5.1  3.5 - 11.3 k/uL Final    RBC 05/17/2021 4.34  4.21 - 5.77 m/uL Final    Hemoglobin 05/17/2021 13.4  13.0 - 17.0 g/dL Final    Hematocrit 05/17/2021 40.9  40.7 - 50.3 % Final    MCV 05/17/2021 94.2  82.6 - 102.9 fL Final    MCH 05/17/2021 30.9  25.2 - 33.5 pg Final    MCHC 05/17/2021 32.8  25.2 - 33.5 g/dL Final    RDW 05/17/2021 12.9  11.8 - 14.4 % Final    Platelets 41/89/8710 204  138 - 453 k/uL Final    MPV 05/17/2021 9.9  8.1 - 13.5 fL Final    NRBC Automated 05/17/2021 0.0  0.0 per 100 WBC Final    Differential Type 05/17/2021 NOT REPORTED   Final    Seg Neutrophils 05/17/2021 63  36 - 65 % Final    Lymphocytes 05/17/2021 28  24 - 43 % Final    Monocytes 05/17/2021 6  3 - 12 % Final    Eosinophils % 05/17/2021 2  1 - 4 % Final    Basophils 05/17/2021 1  0 - 2 % Final    Immature Granulocytes 05/17/2021 0  0 % Final    Segs Absolute 05/17/2021 3.16  1.50 - 8.10 k/uL Final    Absolute Lymph # 05/17/2021 1.42  1.10 - 3.70 k/uL Final    Absolute Mono # 05/17/2021 0.32  0.10 - 1.20 k/uL Final    Absolute Eos # 05/17/2021 0.11  0.00 - 0.44 k/uL Final    Basophils Absolute 05/17/2021 0.04  0.00 - 0.20 k/uL Final    Absolute Immature Granulocyte 05/17/2021 <0.03  0.00 - 0.30 k/uL Final    WBC Morphology 05/17/2021 NOT REPORTED   Final    RBC Morphology 05/17/2021 NOT REPORTED   Final    Platelet Estimate 83/71/2831 NOT REPORTED   Final    Glucose 05/17/2021 99  70 - 99 mg/dL Final    BUN 05/17/2021 15  6 - 20 mg/dL Final    CREATININE 05/17/2021 0.73  0.70 - 1.20 mg/dL Final    Bun/Cre Ratio 05/17/2021 21* 9 - 20 Final    Calcium 05/17/2021 9.9  8.6 - 10.4 mg/dL Final    Sodium 05/17/2021 144  135 - 144 mmol/L Final    Potassium 05/17/2021 4.2  3.7 - 5.3 mmol/L Final    Chloride 05/17/2021 106  98 - 107 mmol/L Final    CO2 05/17/2021 29  20 - 31 mmol/L Final    Anion Gap 05/17/2021 9  9 - 17 mmol/L Final    Alkaline Phosphatase 05/17/2021 37* 40 - 129 U/L Final    ALT 05/17/2021 13  5 - 41 U/L Final    AST 05/17/2021 18  <40 U/L Final    Total Bilirubin 05/17/2021 0.60  0.3 - 1.2 mg/dL Final    Total Protein 05/17/2021 7.2  6.4 - 8.3 g/dL Final    Albumin 05/17/2021 4.5  3.5 - 5.2 g/dL Final    Albumin/Globulin Ratio 05/17/2021 1.7  1.0 - 2.5 Final    GFR Non- 05/17/2021 >60  >60 mL/min Final    GFR  05/17/2021 >60  >60 mL/min Final    GFR Comment 05/17/2021        Final    Comment: Average GFR for 38-51 years old:   80 mL/min/1.73sq m  Chronic Kidney Disease:   <60 mL/min/1.73sq m  Kidney failure:   <15 mL/min/1.73sq m              eGFR calculated using average adult body mass. Additional eGFR calculator available at:        [email or web address]            GFR Staging 05/17/2021 NOT REPORTED   Final    Hemoglobin A1C 05/17/2021 5.3  4.0 - 6.0 % Final    Estimated Avg Glucose 05/17/2021 105  mg/dL Final    Comment: The ADA and AACC recommend providing the estimated average glucose result to permit better   patient understanding of their HBA1c result.  Cholesterol 05/17/2021 125  <200 mg/dL Final    Comment:    Cholesterol Guidelines:      <200  Desirable   200-240  Borderline      >240  Undesirable         HDL 05/17/2021 62  >40 mg/dL Final    Comment:    HDL Guidelines:    <40     Undesirable   40-59    Borderline    >59     Desirable         LDL Cholesterol 05/17/2021 52  0 - 130 mg/dL Final    Comment:    LDL Guidelines:     <100    Desirable   100-129   Near to/above Desirable   130-159   Borderline      >159   Undesirable     Direct (measured) LDL and calculated LDL are not interchangeable tests.       Chol/HDL Ratio 05/17/2021 2.0  <5 Final            Triglycerides 05/17/2021 56  <150 mg/dL Final    Comment:    Triglyceride Guidelines:     <150   Desirable   150-199  Borderline   200-499  High     >499   Very high   Based on AHA Guidelines for fasting triglyceride, October 2012.  VLDL 05/17/2021 NOT REPORTED  1 - 30 mg/dL Final    Microalb, Ur 05/17/2021 <12  <21 mg/L Final    Creatinine, Ur 05/17/2021 163.6  39.0 - 259.0 mg/dL Final    Microalb/Crt.  Ratio 05/17/2021 CANNOT BE CALCULATED  <17 mcg/mg creat Final [Time Spent: ___ minutes] : I have spent [unfilled] minutes of time on the encounter.

## 2023-02-15 RX ORDER — OLANZAPINE 10 MG/1
TABLET ORAL
Qty: 28 TABLET | Refills: 5 | Status: SHIPPED | OUTPATIENT
Start: 2023-02-15

## 2023-02-15 RX ORDER — OXYBUTYNIN CHLORIDE 10 MG/1
10 TABLET, EXTENDED RELEASE ORAL DAILY
Qty: 90 TABLET | Refills: 1 | Status: SHIPPED | OUTPATIENT
Start: 2023-02-15

## 2023-02-15 NOTE — TELEPHONE ENCOUNTER
Amalia Arteaga called requesting a refill of the below medication which has been pended for you:     Requested Prescriptions     Pending Prescriptions Disp Refills    oxybutynin (DITROPAN-XL) 10 MG extended release tablet 30 tablet 5     Sig: Take 1 tablet by mouth daily       Last Appointment Date: 1/3/2023  Next Appointment Date: 2/22/2023    No Known Allergies

## 2023-02-15 NOTE — TELEPHONE ENCOUNTER
Amalia Arteaga called requesting a refill of the below medication which has been pended for you:     Requested Prescriptions     Pending Prescriptions Disp Refills    OLANZapine (ZYPREXA) 10 MG tablet [Pharmacy Med Name: OLANZAPINE 10 MG TABS 10 Tablet] 28 tablet 2     Sig: TAKE 1 TABLET BY MOUTH EVERY NIGHT AT BEDTIME       Last Appointment Date: 1/3/2023  Next Appointment Date: 2/22/2023    No Known Allergies

## 2023-02-22 ENCOUNTER — OFFICE VISIT (OUTPATIENT)
Dept: FAMILY MEDICINE CLINIC | Age: 48
End: 2023-02-22
Payer: MEDICARE

## 2023-02-22 VITALS
WEIGHT: 139 LBS | HEART RATE: 112 BPM | TEMPERATURE: 97.3 F | RESPIRATION RATE: 18 BRPM | SYSTOLIC BLOOD PRESSURE: 110 MMHG | DIASTOLIC BLOOD PRESSURE: 70 MMHG | BODY MASS INDEX: 21.82 KG/M2 | HEIGHT: 67 IN | OXYGEN SATURATION: 97 %

## 2023-02-22 DIAGNOSIS — F45.8 TEETH GRINDING: ICD-10-CM

## 2023-02-22 DIAGNOSIS — T50.905A ADVERSE EFFECT OF DRUG, INITIAL ENCOUNTER: ICD-10-CM

## 2023-02-22 DIAGNOSIS — F84.0 AUTISM: Primary | ICD-10-CM

## 2023-02-22 PROCEDURE — G8420 CALC BMI NORM PARAMETERS: HCPCS | Performed by: FAMILY MEDICINE

## 2023-02-22 PROCEDURE — 99213 OFFICE O/P EST LOW 20 MIN: CPT | Performed by: FAMILY MEDICINE

## 2023-02-22 PROCEDURE — 99212 OFFICE O/P EST SF 10 MIN: CPT | Performed by: FAMILY MEDICINE

## 2023-02-22 PROCEDURE — G8482 FLU IMMUNIZE ORDER/ADMIN: HCPCS | Performed by: FAMILY MEDICINE

## 2023-02-22 PROCEDURE — 1036F TOBACCO NON-USER: CPT | Performed by: FAMILY MEDICINE

## 2023-02-22 PROCEDURE — G8427 DOCREV CUR MEDS BY ELIG CLIN: HCPCS | Performed by: FAMILY MEDICINE

## 2023-02-22 SDOH — ECONOMIC STABILITY: FOOD INSECURITY: WITHIN THE PAST 12 MONTHS, YOU WORRIED THAT YOUR FOOD WOULD RUN OUT BEFORE YOU GOT MONEY TO BUY MORE.: NEVER TRUE

## 2023-02-22 SDOH — ECONOMIC STABILITY: HOUSING INSECURITY
IN THE LAST 12 MONTHS, WAS THERE A TIME WHEN YOU DID NOT HAVE A STEADY PLACE TO SLEEP OR SLEPT IN A SHELTER (INCLUDING NOW)?: NO

## 2023-02-22 SDOH — ECONOMIC STABILITY: FOOD INSECURITY: WITHIN THE PAST 12 MONTHS, THE FOOD YOU BOUGHT JUST DIDN'T LAST AND YOU DIDN'T HAVE MONEY TO GET MORE.: NEVER TRUE

## 2023-02-22 SDOH — ECONOMIC STABILITY: INCOME INSECURITY: HOW HARD IS IT FOR YOU TO PAY FOR THE VERY BASICS LIKE FOOD, HOUSING, MEDICAL CARE, AND HEATING?: NOT HARD AT ALL

## 2023-02-22 ASSESSMENT — ENCOUNTER SYMPTOMS
RHINORRHEA: 0
TROUBLE SWALLOWING: 0
VOMITING: 0
SINUS PRESSURE: 0
SHORTNESS OF BREATH: 0
EYE REDNESS: 0
CONSTIPATION: 0
EYE DISCHARGE: 0
SORE THROAT: 0
NAUSEA: 0
DIARRHEA: 0
WHEEZING: 0
COUGH: 0
ABDOMINAL PAIN: 0

## 2023-02-22 NOTE — PROGRESS NOTES
2023     Radha Frey (:  1975) is a 50 y.o. male, here for evaluation of the following medical concerns:    HPI  Patient comes in today for follow-up regarding his med change from Seroquel which was causing significant issues with teeth grinding to zyprexa. Caregiver does note that he is doing much better with the Zyprexa. The teeth grinding has stopped and he is not having issues with the new medication. He is staying very calm and not having as much issues with getting up at 4 AM getting dressed like he was previously. Is tolerating it well and caregiver notes that they are getting established with a psychiatrist for continued med management to discuss further med changes if needed. Overall however he seems much better with this antipsychotic than he did with Zyprexa. Patient otherwise is doing well and they did need an annual review of his standing order med list and his general overall assessment and health status. This paperwork will be completed today. His weight is increased compared to last check and they are giving him some supplemental shakes. He has a very good appetite and overall his weight has neutralized which is also reassuring. Patient otherwise has no other acute issues. Did review patient's med list, allergies, social history, fam history, pmhx and pshx today as noted in the record. Review of Systems   Constitutional:  Negative for chills, fatigue and fever. HENT:  Negative for congestion, ear pain, postnasal drip, rhinorrhea, sinus pressure, sore throat and trouble swallowing. Eyes:  Negative for discharge and redness. Respiratory:  Negative for cough, shortness of breath and wheezing. Cardiovascular:  Negative for chest pain. Gastrointestinal:  Negative for abdominal pain, constipation, diarrhea, nausea and vomiting. Genitourinary:  Negative for dysuria, flank pain, frequency and urgency.    Musculoskeletal:  Negative for arthralgias, myalgias and neck pain. Skin:  Negative for rash and wound. Allergic/Immunologic: Negative for environmental allergies. Neurological:  Negative for dizziness, weakness, light-headedness and headaches. Hematological:  Negative for adenopathy. Psychiatric/Behavioral: Negative. Prior to Visit Medications    Medication Sig Taking? Authorizing Provider   oxybutynin (DITROPAN-XL) 10 MG extended release tablet Take 1 tablet by mouth daily  Rachele Diaz,    OLANZapine (ZYPREXA) 10 MG tablet TAKE 1 TABLET BY MOUTH EVERY NIGHT AT BEDTIME  Rachele Diaz, DO   QUEtiapine (SEROQUEL XR) 300 MG extended release tablet Take 1 tablet by mouth nightly  Cloretta Laneville, DO   atorvastatin (LIPITOR) 40 MG tablet TAKE 1 TABLET BY MOUTH DAILY  Cloretta Laneville, DO   sodium chloride (ALTAMIST SPRAY) 0.65 % nasal spray Use 2 sprays in each nostril three times daily prn dryness  Rachele Diaz DO   hydrocortisone (ANUSOL-HC) 2.5 % CREA rectal cream Apply to perirectal area as needed for itching or irritation  Cloretta Laneville, DO   GLUCOCARD VITAL TEST strip TEST ONCE DAILY  Cloretta Laneville, DO        Social History     Tobacco Use    Smoking status: Never    Smokeless tobacco: Never   Substance Use Topics    Alcohol use: No        There were no vitals filed for this visit. Estimated body mass index is 21.16 kg/m² as calculated from the following:    Height as of 1/3/23: 5' 7\" (1.702 m). Weight as of 1/3/23: 135 lb 1.6 oz (61.3 kg). Physical Exam  Vitals and nursing note reviewed. Constitutional:       General: He is not in acute distress. Appearance: He is well-developed. He is not diaphoretic. HENT:      Head: Normocephalic and atraumatic. Right Ear: Tympanic membrane normal.      Left Ear: Tympanic membrane normal.      Nose: Nose normal. No congestion or rhinorrhea. Mouth/Throat:      Mouth: Mucous membranes are moist.      Pharynx: Oropharynx is clear.  No oropharyngeal exudate or posterior oropharyngeal erythema. Eyes:      Conjunctiva/sclera: Conjunctivae normal.   Cardiovascular:      Rate and Rhythm: Normal rate and regular rhythm. Pulmonary:      Effort: Pulmonary effort is normal.      Breath sounds: Normal breath sounds. No wheezing, rhonchi or rales. Musculoskeletal:      Cervical back: Normal range of motion. Lymphadenopathy:      Cervical: No cervical adenopathy. Skin:     General: Skin is warm and dry. Coloration: Skin is not pale. Findings: No erythema or rash. Neurological:      Mental Status: He is alert and oriented to person, place, and time. Psychiatric:         Behavior: Behavior normal.         Thought Content: Thought content normal.         Judgment: Judgment normal.       ASSESSMENT/PLAN:    Encounter Diagnoses   Name Primary? Autism Yes    Teeth grinding     Adverse effect of drug, initial encounter      Continue on current medical regimen. Follow up with psychiatry as scheduled for continued recommendations regarding med management. Did complete his annual paperwork and reviewed annual med orders and standing orders for his group home. Patient is to return to my office in 6 months duration or sooner if any further problems or symptoms arise. (Please note that portions of this note were completed with a voice recognition program. Efforts were made to edit the dictations but occasionally words are mis-transcribed.)    No follow-ups on file. An electronic signature was used to authenticate this note.     --Yaz Jimenez, DO on 2/22/2023 at 6:40 AM

## 2023-03-15 RX ORDER — ATORVASTATIN CALCIUM 40 MG/1
TABLET, FILM COATED ORAL
Qty: 90 TABLET | Refills: 1 | Status: SHIPPED | OUTPATIENT
Start: 2023-03-15

## 2023-03-15 NOTE — TELEPHONE ENCOUNTER
Eayd Lyons called requesting a refill of the below medication which has been pended for you:     Requested Prescriptions     Pending Prescriptions Disp Refills    atorvastatin (LIPITOR) 40 MG tablet [Pharmacy Med Name: ATORVASTATIN 40 MG TABLET 40 Tablet] 28 tablet 5     Sig: TAKE 1 TABLET BY MOUTH DAILY       Last Appointment Date: 2/22/2023  Next Appointment Date: 7/5/2023    No Known Allergies

## 2023-06-30 RX ORDER — OXYBUTYNIN CHLORIDE 10 MG/1
10 TABLET, EXTENDED RELEASE ORAL DAILY
Qty: 90 TABLET | Refills: 1 | Status: SHIPPED | OUTPATIENT
Start: 2023-06-30

## 2023-07-05 ENCOUNTER — OFFICE VISIT (OUTPATIENT)
Dept: FAMILY MEDICINE CLINIC | Age: 48
End: 2023-07-05
Payer: MEDICARE

## 2023-07-05 ENCOUNTER — HOSPITAL ENCOUNTER (OUTPATIENT)
Age: 48
Discharge: HOME | End: 2023-07-05
Payer: MEDICARE

## 2023-07-05 VITALS
OXYGEN SATURATION: 96 % | DIASTOLIC BLOOD PRESSURE: 80 MMHG | WEIGHT: 149 LBS | HEART RATE: 48 BPM | RESPIRATION RATE: 20 BRPM | SYSTOLIC BLOOD PRESSURE: 110 MMHG | TEMPERATURE: 97 F | HEIGHT: 67 IN | BODY MASS INDEX: 23.39 KG/M2

## 2023-07-05 DIAGNOSIS — E11.9 CONTROLLED TYPE 2 DIABETES MELLITUS WITHOUT COMPLICATION, WITHOUT LONG-TERM CURRENT USE OF INSULIN (HCC): Primary | ICD-10-CM

## 2023-07-05 DIAGNOSIS — E78.2 MIXED HYPERLIPIDEMIA: ICD-10-CM

## 2023-07-05 DIAGNOSIS — F84.0 AUTISM: ICD-10-CM

## 2023-07-05 DIAGNOSIS — E11.9 CONTROLLED TYPE 2 DIABETES MELLITUS WITHOUT COMPLICATION, WITHOUT LONG-TERM CURRENT USE OF INSULIN (HCC): ICD-10-CM

## 2023-07-05 LAB
ALBUMIN SERPL-MCNC: 4.4 G/DL (ref 3.5–5.2)
ALBUMIN/GLOB SERPL: 1.4 {RATIO} (ref 1–2.5)
ALP SERPL-CCNC: 45 U/L (ref 40–129)
ALT SERPL-CCNC: 23 U/L (ref 5–41)
ANION GAP SERPL CALCULATED.3IONS-SCNC: 9 MMOL/L (ref 9–17)
AST SERPL-CCNC: 24 U/L
BASOPHILS # BLD: 0.04 K/UL (ref 0–0.2)
BASOPHILS NFR BLD: 1 % (ref 0–2)
BILIRUB SERPL-MCNC: 0.5 MG/DL (ref 0.3–1.2)
BUN SERPL-MCNC: 14 MG/DL (ref 6–20)
BUN/CREAT SERPL: 22 (ref 9–20)
CALCIUM SERPL-MCNC: 9.9 MG/DL (ref 8.6–10.4)
CHLORIDE SERPL-SCNC: 102 MMOL/L (ref 98–107)
CHOLEST SERPL-MCNC: 123 MG/DL
CHOLESTEROL/HDL RATIO: 2
CO2 SERPL-SCNC: 29 MMOL/L (ref 20–31)
CREAT SERPL-MCNC: 0.63 MG/DL (ref 0.7–1.2)
EOSINOPHIL # BLD: 0.14 K/UL (ref 0–0.44)
EOSINOPHILS RELATIVE PERCENT: 2 % (ref 1–4)
ERYTHROCYTE [DISTWIDTH] IN BLOOD BY AUTOMATED COUNT: 13 % (ref 11.8–14.4)
EST. AVERAGE GLUCOSE BLD GHB EST-MCNC: 126 MG/DL
GFR SERPL CREATININE-BSD FRML MDRD: >60 ML/MIN/1.73M2
GLUCOSE SERPL-MCNC: 104 MG/DL (ref 70–99)
HBA1C MFR BLD: 6 % (ref 4–6)
HCT VFR BLD AUTO: 42.4 % (ref 40.7–50.3)
HDLC SERPL-MCNC: 63 MG/DL
HGB BLD-MCNC: 13.9 G/DL (ref 13–17)
IMM GRANULOCYTES # BLD AUTO: <0.03 K/UL (ref 0–0.3)
IMM GRANULOCYTES NFR BLD: 0 %
LDLC SERPL CALC-MCNC: 48 MG/DL (ref 0–130)
LYMPHOCYTES # BLD: 21 % (ref 24–43)
LYMPHOCYTES NFR BLD: 1.36 K/UL (ref 1.1–3.7)
MCH RBC QN AUTO: 30.3 PG (ref 25.2–33.5)
MCHC RBC AUTO-ENTMCNC: 32.8 G/DL (ref 25.2–33.5)
MCV RBC AUTO: 92.4 FL (ref 82.6–102.9)
MONOCYTES NFR BLD: 0.4 K/UL (ref 0.1–1.2)
MONOCYTES NFR BLD: 6 % (ref 3–12)
NEUTROPHILS NFR BLD: 70 % (ref 36–65)
NEUTS SEG NFR BLD: 4.52 K/UL (ref 1.5–8.1)
NRBC BLD-RTO: 0 PER 100 WBC
PLATELET # BLD AUTO: 207 K/UL (ref 138–453)
PMV BLD AUTO: 9.2 FL (ref 8.1–13.5)
POTASSIUM SERPL-SCNC: 4 MMOL/L (ref 3.7–5.3)
PROT SERPL-MCNC: 7.5 G/DL (ref 6.4–8.3)
RBC # BLD AUTO: 4.59 M/UL (ref 4.21–5.77)
SODIUM SERPL-SCNC: 140 MMOL/L (ref 135–144)
TRIGL SERPL-MCNC: 59 MG/DL
WBC OTHER # BLD: 6.5 K/UL (ref 3.5–11.3)

## 2023-07-05 PROCEDURE — G8427 DOCREV CUR MEDS BY ELIG CLIN: HCPCS | Performed by: FAMILY MEDICINE

## 2023-07-05 PROCEDURE — 83036 HEMOGLOBIN GLYCOSYLATED A1C: CPT

## 2023-07-05 PROCEDURE — 1036F TOBACCO NON-USER: CPT | Performed by: FAMILY MEDICINE

## 2023-07-05 PROCEDURE — 3046F HEMOGLOBIN A1C LEVEL >9.0%: CPT | Performed by: FAMILY MEDICINE

## 2023-07-05 PROCEDURE — 99214 OFFICE O/P EST MOD 30 MIN: CPT | Performed by: FAMILY MEDICINE

## 2023-07-05 PROCEDURE — G8420 CALC BMI NORM PARAMETERS: HCPCS | Performed by: FAMILY MEDICINE

## 2023-07-05 PROCEDURE — 80061 LIPID PANEL: CPT

## 2023-07-05 PROCEDURE — 85027 COMPLETE CBC AUTOMATED: CPT

## 2023-07-05 PROCEDURE — 99212 OFFICE O/P EST SF 10 MIN: CPT | Performed by: FAMILY MEDICINE

## 2023-07-05 PROCEDURE — 2022F DILAT RTA XM EVC RTNOPTHY: CPT | Performed by: FAMILY MEDICINE

## 2023-07-05 PROCEDURE — 80053 COMPREHEN METABOLIC PANEL: CPT

## 2023-07-05 PROCEDURE — 36415 COLL VENOUS BLD VENIPUNCTURE: CPT

## 2023-07-05 ASSESSMENT — ENCOUNTER SYMPTOMS
CONSTIPATION: 0
DIARRHEA: 0
EYE DISCHARGE: 0
COUGH: 0
ABDOMINAL PAIN: 0
SHORTNESS OF BREATH: 0
EYE REDNESS: 0
RHINORRHEA: 0
VOMITING: 0
WHEEZING: 0
NAUSEA: 0
TROUBLE SWALLOWING: 0
SORE THROAT: 0
SINUS PRESSURE: 0

## 2023-07-05 NOTE — PROGRESS NOTES
2023     Herb Barbour (:  1975) is a 50 y.o. male, here for evaluation of the following medical concerns:    HPI  Patient comes in today for follow up of chronic health issues Patient overall seems to be relatively stable. They did get into see psychiatry for opinion regarding his anxiety related to his autism. They have not made any med changes yet but do have a 2-hour evaluation coming up for further assessment before making further med changes. With regards to his chronic health conditions he does have a known history of diabetes mellitus type 2 which has been stable and controlled with his current dietary intake. Did get lab work today so we will reassess once I get his results regarding his diabetic management. He has put on some weight as they are doing some supplemental nutrition shakes to give him additional caloric intake and so this is overall an improvement as he was continuing to lose weight. Does have a known history of hyperlipidemia which has been stable and controlled with his current medical therapy. Patient otherwise has no other acute medical concerns at this time. .  Patient's recent lab reports are as follows:    Results for orders placed or performed during the hospital encounter of 22   Lipid Panel   Result Value Ref Range    Cholesterol 123 <200 mg/dL    HDL 52 >40 mg/dL    LDL Cholesterol 61 0 - 130 mg/dL    Chol/HDL Ratio 2.4 <5    Triglycerides 51 <150 mg/dL   Hemoglobin A1C   Result Value Ref Range    Hemoglobin A1C 5.8 4.0 - 6.0 %    Estimated Avg Glucose 120 mg/dL   Comprehensive Metabolic Panel   Result Value Ref Range    Glucose 99 70 - 99 mg/dL    BUN 16 6 - 20 mg/dL    Creatinine 0.57 (L) 0.70 - 1.20 mg/dL    Est, Glom Filt Rate >60 >60 mL/min/1.73m2    Bun/Cre Ratio 28 (H) 9 - 20    Calcium 9.9 8.6 - 10.4 mg/dL    Sodium 141 135 - 144 mmol/L    Potassium 4.1 3.7 - 5.3 mmol/L    Chloride 103 98 - 107 mmol/L    CO2 29 20 - 31 mmol/L    Anion Gap 9 9 - 17

## 2023-08-01 RX ORDER — OLANZAPINE 10 MG/1
TABLET ORAL
Qty: 28 TABLET | Refills: 5 | Status: SHIPPED | OUTPATIENT
Start: 2023-08-01

## 2023-08-01 NOTE — TELEPHONE ENCOUNTER
Marielena Ballard called requesting a refill of the below medication which has been pended for you:     Requested Prescriptions     Pending Prescriptions Disp Refills    OLANZapine (ZYPREXA) 10 MG tablet [Pharmacy Med Name: OLANZAPINE 10 MG TABS 10 Tablet] 28 tablet 5     Sig: TAKE 1 TABLET BY MOUTH EVERY NIGHT AT BEDTIME       Last Appointment Date: 7/5/2023  Next Appointment Date: 1/4/2024    Allergies   Allergen Reactions    Pcn [Penicillins]

## 2023-08-29 RX ORDER — ATORVASTATIN CALCIUM 40 MG/1
TABLET, FILM COATED ORAL
Qty: 90 TABLET | Refills: 1 | Status: SHIPPED | OUTPATIENT
Start: 2023-08-29

## 2023-08-29 NOTE — TELEPHONE ENCOUNTER
Delwyn Closs called requesting a refill of the below medication which has been pended for you:     Requested Prescriptions     Pending Prescriptions Disp Refills    atorvastatin (LIPITOR) 40 MG tablet [Pharmacy Med Name: ATORVASTATIN 40 MG TABLET 40 Tablet] 90 tablet 1     Sig: TAKE 1 TABLET BY MOUTH DAILY       Last Appointment Date: 7/5/2023  Next Appointment Date: 1/4/2024    Allergies   Allergen Reactions    Pcn [Penicillins]

## 2023-12-14 RX ORDER — OXYBUTYNIN CHLORIDE 10 MG/1
10 TABLET, EXTENDED RELEASE ORAL DAILY
Qty: 28 TABLET | Refills: 11 | Status: SHIPPED | OUTPATIENT
Start: 2023-12-14

## 2023-12-14 NOTE — TELEPHONE ENCOUNTER
Mikeyice December called requesting a refill of the below medication which has been pended for you:     Requested Prescriptions     Pending Prescriptions Disp Refills    oxybutynin (DITROPAN-XL) 10 MG extended release tablet [Pharmacy Med Name: OXYBUTYNIN CHLORIDE ER 10 M 10 Tablet] 28 tablet 1     Sig: TAKE 1 TABLET BY MOUTH DAILY       Last Appointment Date: 7/5/2023  Next Appointment Date: 1/4/2024    Allergies   Allergen Reactions    Pcn [Penicillins]

## 2024-01-03 ENCOUNTER — HOSPITAL ENCOUNTER (OUTPATIENT)
Age: 49
Discharge: HOME | End: 2024-01-03
Payer: MEDICARE

## 2024-01-03 DIAGNOSIS — E11.9 CONTROLLED TYPE 2 DIABETES MELLITUS WITHOUT COMPLICATION, WITHOUT LONG-TERM CURRENT USE OF INSULIN (HCC): ICD-10-CM

## 2024-01-03 DIAGNOSIS — E78.2 MIXED HYPERLIPIDEMIA: ICD-10-CM

## 2024-01-03 LAB
ALBUMIN SERPL-MCNC: 4.3 G/DL (ref 3.5–5.2)
ALBUMIN/GLOB SERPL: 1.5 {RATIO} (ref 1–2.5)
ALP SERPL-CCNC: 44 U/L (ref 40–129)
ALT SERPL-CCNC: 14 U/L (ref 5–41)
ANION GAP SERPL CALCULATED.3IONS-SCNC: 7 MMOL/L (ref 9–17)
AST SERPL-CCNC: 21 U/L
BASOPHILS # BLD: 0.06 K/UL (ref 0–0.2)
BASOPHILS NFR BLD: 1 % (ref 0–2)
BILIRUB SERPL-MCNC: 0.5 MG/DL (ref 0.3–1.2)
BUN SERPL-MCNC: 12 MG/DL (ref 6–20)
BUN/CREAT SERPL: 20 (ref 9–20)
CALCIUM SERPL-MCNC: 9.6 MG/DL (ref 8.6–10.4)
CHLORIDE SERPL-SCNC: 103 MMOL/L (ref 98–107)
CHOLEST SERPL-MCNC: 121 MG/DL
CHOLESTEROL/HDL RATIO: 2.1
CO2 SERPL-SCNC: 30 MMOL/L (ref 20–31)
CREAT SERPL-MCNC: 0.6 MG/DL (ref 0.7–1.2)
CREAT UR-MCNC: 49.1 MG/DL (ref 39–259)
EOSINOPHIL # BLD: 0.14 K/UL (ref 0–0.44)
EOSINOPHILS RELATIVE PERCENT: 2 % (ref 1–4)
ERYTHROCYTE [DISTWIDTH] IN BLOOD BY AUTOMATED COUNT: 12.5 % (ref 11.8–14.4)
EST. AVERAGE GLUCOSE BLD GHB EST-MCNC: 123 MG/DL
GFR SERPL CREATININE-BSD FRML MDRD: >60 ML/MIN/1.73M2
GLUCOSE SERPL-MCNC: 99 MG/DL (ref 70–99)
HBA1C MFR BLD: 5.9 % (ref 4–6)
HCT VFR BLD AUTO: 41.7 % (ref 40.7–50.3)
HDLC SERPL-MCNC: 57 MG/DL
HGB BLD-MCNC: 13.8 G/DL (ref 13–17)
IMM GRANULOCYTES # BLD AUTO: <0.03 K/UL (ref 0–0.3)
IMM GRANULOCYTES NFR BLD: 0 %
LDLC SERPL CALC-MCNC: 53 MG/DL (ref 0–130)
LYMPHOCYTES NFR BLD: 1.4 K/UL (ref 1.1–3.7)
LYMPHOCYTES RELATIVE PERCENT: 22 % (ref 24–43)
MCH RBC QN AUTO: 30.5 PG (ref 25.2–33.5)
MCHC RBC AUTO-ENTMCNC: 33.1 G/DL (ref 25.2–33.5)
MCV RBC AUTO: 92.1 FL (ref 82.6–102.9)
MICROALBUMIN UR-MCNC: <12 MG/L
MICROALBUMIN/CREAT UR-RTO: NORMAL MCG/MG CREAT
MONOCYTES NFR BLD: 0.45 K/UL (ref 0.1–1.2)
MONOCYTES NFR BLD: 7 % (ref 3–12)
NEUTROPHILS NFR BLD: 68 % (ref 36–65)
NEUTS SEG NFR BLD: 4.32 K/UL (ref 1.5–8.1)
NRBC BLD-RTO: 0 PER 100 WBC
PLATELET # BLD AUTO: 236 K/UL (ref 138–453)
PMV BLD AUTO: 9.6 FL (ref 8.1–13.5)
POTASSIUM SERPL-SCNC: 4.3 MMOL/L (ref 3.7–5.3)
PROT SERPL-MCNC: 7.1 G/DL (ref 6.4–8.3)
RBC # BLD AUTO: 4.53 M/UL (ref 4.21–5.77)
SODIUM SERPL-SCNC: 140 MMOL/L (ref 135–144)
TRIGL SERPL-MCNC: 56 MG/DL
WBC OTHER # BLD: 6.4 K/UL (ref 3.5–11.3)

## 2024-01-03 PROCEDURE — 80061 LIPID PANEL: CPT

## 2024-01-03 PROCEDURE — 82570 ASSAY OF URINE CREATININE: CPT

## 2024-01-03 PROCEDURE — 80053 COMPREHEN METABOLIC PANEL: CPT

## 2024-01-03 PROCEDURE — 83036 HEMOGLOBIN GLYCOSYLATED A1C: CPT

## 2024-01-03 PROCEDURE — 36415 COLL VENOUS BLD VENIPUNCTURE: CPT

## 2024-01-03 PROCEDURE — 85025 COMPLETE CBC W/AUTO DIFF WBC: CPT

## 2024-01-03 PROCEDURE — 82043 UR ALBUMIN QUANTITATIVE: CPT

## 2024-01-04 ENCOUNTER — OFFICE VISIT (OUTPATIENT)
Dept: FAMILY MEDICINE CLINIC | Age: 49
End: 2024-01-04
Payer: MEDICARE

## 2024-01-04 VITALS
TEMPERATURE: 97 F | SYSTOLIC BLOOD PRESSURE: 100 MMHG | HEART RATE: 92 BPM | OXYGEN SATURATION: 99 % | RESPIRATION RATE: 18 BRPM | HEIGHT: 67 IN | DIASTOLIC BLOOD PRESSURE: 64 MMHG | BODY MASS INDEX: 22.57 KG/M2 | WEIGHT: 143.8 LBS

## 2024-01-04 DIAGNOSIS — E78.2 MIXED HYPERLIPIDEMIA: ICD-10-CM

## 2024-01-04 DIAGNOSIS — E11.9 CONTROLLED TYPE 2 DIABETES MELLITUS WITHOUT COMPLICATION, WITHOUT LONG-TERM CURRENT USE OF INSULIN (HCC): Primary | ICD-10-CM

## 2024-01-04 DIAGNOSIS — F84.0 AUTISM: ICD-10-CM

## 2024-01-04 DIAGNOSIS — Z00.00 MEDICARE ANNUAL WELLNESS VISIT, SUBSEQUENT: ICD-10-CM

## 2024-01-04 PROCEDURE — 3044F HG A1C LEVEL LT 7.0%: CPT | Performed by: FAMILY MEDICINE

## 2024-01-04 PROCEDURE — 3017F COLORECTAL CA SCREEN DOC REV: CPT | Performed by: FAMILY MEDICINE

## 2024-01-04 PROCEDURE — G8482 FLU IMMUNIZE ORDER/ADMIN: HCPCS | Performed by: FAMILY MEDICINE

## 2024-01-04 PROCEDURE — G0439 PPPS, SUBSEQ VISIT: HCPCS | Performed by: FAMILY MEDICINE

## 2024-01-04 PROCEDURE — G8427 DOCREV CUR MEDS BY ELIG CLIN: HCPCS | Performed by: FAMILY MEDICINE

## 2024-01-04 PROCEDURE — G8420 CALC BMI NORM PARAMETERS: HCPCS | Performed by: FAMILY MEDICINE

## 2024-01-04 PROCEDURE — 2022F DILAT RTA XM EVC RTNOPTHY: CPT | Performed by: FAMILY MEDICINE

## 2024-01-04 PROCEDURE — 90686 IIV4 VACC NO PRSV 0.5 ML IM: CPT | Performed by: FAMILY MEDICINE

## 2024-01-04 PROCEDURE — 1036F TOBACCO NON-USER: CPT | Performed by: FAMILY MEDICINE

## 2024-01-04 PROCEDURE — PBSHW INFLUENZA, FLULAVAL, (AGE 6 MO+), IM, PF, 0.5ML: Performed by: FAMILY MEDICINE

## 2024-01-04 PROCEDURE — 99214 OFFICE O/P EST MOD 30 MIN: CPT | Performed by: FAMILY MEDICINE

## 2024-01-04 ASSESSMENT — ENCOUNTER SYMPTOMS
SORE THROAT: 0
WHEEZING: 0
VOMITING: 0
ABDOMINAL PAIN: 0
COUGH: 0
SHORTNESS OF BREATH: 0
NAUSEA: 0
EYE REDNESS: 0
SINUS PRESSURE: 0
RHINORRHEA: 0
EYE DISCHARGE: 0
DIARRHEA: 0
CONSTIPATION: 0
TROUBLE SWALLOWING: 0

## 2024-01-04 ASSESSMENT — PATIENT HEALTH QUESTIONNAIRE - PHQ9
SUM OF ALL RESPONSES TO PHQ QUESTIONS 1-9: 0
SUM OF ALL RESPONSES TO PHQ QUESTIONS 1-9: 0
SUM OF ALL RESPONSES TO PHQ9 QUESTIONS 1 & 2: 0
SUM OF ALL RESPONSES TO PHQ QUESTIONS 1-9: 0
2. FEELING DOWN, DEPRESSED OR HOPELESS: 0
1. LITTLE INTEREST OR PLEASURE IN DOING THINGS: 0
SUM OF ALL RESPONSES TO PHQ QUESTIONS 1-9: 0

## 2024-01-04 ASSESSMENT — LIFESTYLE VARIABLES
HOW MANY STANDARD DRINKS CONTAINING ALCOHOL DO YOU HAVE ON A TYPICAL DAY: PATIENT DOES NOT DRINK
HOW OFTEN DO YOU HAVE A DRINK CONTAINING ALCOHOL: NEVER

## 2024-01-04 NOTE — PROGRESS NOTES
Has eye appt scheduled. Caregiver will check with guardian regarding shingles and pneumonia vaccine. They will have patient get an updated covid booster.

## 2024-01-04 NOTE — PROGRESS NOTES
Medicare Annual Wellness Visit    Jaun Pierce is here for Medicare AWV (Subsequent; last 1/3/23), Diabetes (Foot exam), Hyperlipidemia, autism, and Discuss Labs (Drawn 1/3/24)    Assessment & Plan   Controlled type 2 diabetes mellitus without complication, without long-term current use of insulin (HCC)  Mixed hyperlipidemia  Autism  Medicare annual wellness visit, subsequent    Recommendations for Preventive Services Due: see orders and patient instructions/AVS.  Recommended screening schedule for the next 5-10 years is provided to the patient in written form: see Patient Instructions/AVS.     No follow-ups on file.     Subjective       Patient's complete Health Risk Assessment and screening values have been reviewed and are found in Flowsheets. The following problems were reviewed today and where indicated follow up appointments were made and/or referrals ordered.    Positive Risk Factor Screenings with Interventions:                     ADL's:   Patient reports needing help with:  Select all that apply: (!) Bathing, Toileting  Select all that apply: (!) Laundry, Housekeeping, Banking/Finances, Shopping, Telephone Use, Food Preparation, Transportation, Taking Medications (has caregivers 24/7 that assist, he does help sometimes)  Interventions:  Lives in a group home and has a guardian                  Objective   Vitals:    01/04/24 0748   BP: 100/64   Site: Right Upper Arm   Position: Sitting   Cuff Size: Medium Adult   Pulse: 92   Resp: 18   Temp: 97 °F (36.1 °C)   TempSrc: Tympanic   SpO2: 99%   Weight: 65.2 kg (143 lb 12.8 oz)   Height: 1.702 m (5' 7\")      Body mass index is 22.52 kg/m².              Allergies   Allergen Reactions    Pcn [Penicillins]      Prior to Visit Medications    Medication Sig Taking? Authorizing Provider   oxybutynin (DITROPAN-XL) 10 MG extended release tablet TAKE 1 TABLET BY MOUTH DAILY Yes Rachele Diaz W,    atorvastatin (LIPITOR) 40 MG tablet TAKE 1 TABLET BY MOUTH DAILY Yes

## 2024-01-04 NOTE — PATIENT INSTRUCTIONS
Hospital Outpatient Visit on 01/03/2024   Component Date Value Ref Range Status   • Microalb, Ur 01/03/2024 <12  <21 mg/L Final   • Creatinine, Ur 01/03/2024 49.1  39.0 - 259.0 mg/dL Final   • Microalb/Crt. Ratio 01/03/2024 Can not be calculated  <17 mcg/mg creat Final   • Cholesterol 01/03/2024 121  <200 mg/dL Final    Comment:    Cholesterol Guidelines:      <200  Desirable   200-240  Borderline      >240  Undesirable        • HDL 01/03/2024 57  >40 mg/dL Final    Comment:    HDL Guidelines:    <40     Undesirable   40-59    Borderline    >59     Desirable        • LDL Cholesterol 01/03/2024 53  0 - 130 mg/dL Final    Comment:    LDL Guidelines:     <100    Desirable   100-129   Near to/above Desirable   130-159   Borderline      >159   Undesirable     Direct (measured) LDL and calculated LDL are not interchangeable tests.     • Chol/HDL Ratio 01/03/2024 2.1  <5 Final           • Triglycerides 01/03/2024 56  <150 mg/dL Final    Comment:    Triglyceride Guidelines:     <150   Desirable   150-199  Borderline   200-499  High     >499   Very high   Based on AHA Guidelines for fasting triglyceride, October 2012.        • Hemoglobin A1C 01/03/2024 5.9  4.0 - 6.0 % Final   • Estimated Avg Glucose 01/03/2024 123  mg/dL Final    Comment: The ADA and AACC recommend providing the estimated average glucose result to permit better   patient understanding of their HBA1c result.     • Sodium 01/03/2024 140  135 - 144 mmol/L Final   • Potassium 01/03/2024 4.3  3.7 - 5.3 mmol/L Final   • Chloride 01/03/2024 103  98 - 107 mmol/L Final   • CO2 01/03/2024 30  20 - 31 mmol/L Final   • Anion Gap 01/03/2024 7 (L)  9 - 17 mmol/L Final   • Glucose 01/03/2024 99  70 - 99 mg/dL Final   • BUN 01/03/2024 12  6 - 20 mg/dL Final   • Creatinine 01/03/2024 0.6 (L)  0.7 - 1.2 mg/dL Final   • Est, Glom Filt Rate 01/03/2024 >60  >60 mL/min/1.73m2 Final    Comment:       These results are not intended for use in patients <18 years of age.        eGFR

## 2024-01-16 RX ORDER — OLANZAPINE 10 MG/1
TABLET ORAL
Qty: 28 TABLET | Refills: 5 | Status: SHIPPED | OUTPATIENT
Start: 2024-01-16

## 2024-01-16 NOTE — TELEPHONE ENCOUNTER
Jaun called requesting a refill of the below medication which has been pended for you:     Requested Prescriptions     Pending Prescriptions Disp Refills    OLANZapine (ZYPREXA) 10 MG tablet [Pharmacy Med Name: OLANZAPINE 10 MG TABS 10 Tablet] 28 tablet 5     Sig: TAKE 1 TABLET BY MOUTH EVERY NIGHT AT BEDTIME       Last Appointment Date: 1/4/2024  Next Appointment Date: 7/5/2024    Allergies   Allergen Reactions    Pcn [Penicillins]

## 2024-02-12 RX ORDER — ATORVASTATIN CALCIUM 40 MG/1
TABLET, FILM COATED ORAL
Qty: 90 TABLET | Refills: 1 | Status: SHIPPED | OUTPATIENT
Start: 2024-02-12

## 2024-02-12 NOTE — TELEPHONE ENCOUNTER
Jaun called requesting a refill of the below medication which has been pended for you:     Requested Prescriptions     Pending Prescriptions Disp Refills    atorvastatin (LIPITOR) 40 MG tablet [Pharmacy Med Name: ATORVASTATIN 40 MG TABLET 40 Tablet] 90 tablet 1     Sig: TAKE 1 TABLET BY MOUTH DAILY       Last Appointment Date: 1/4/2024  Next Appointment Date: 7/5/2024    Allergies   Allergen Reactions    Pcn [Penicillins]

## 2024-07-02 RX ORDER — OLANZAPINE 10 MG/1
TABLET ORAL
Qty: 28 TABLET | Refills: 2 | Status: SHIPPED | OUTPATIENT
Start: 2024-07-02

## 2024-07-02 NOTE — TELEPHONE ENCOUNTER
Jaun called requesting a refill of the below medication which has been pended for you:     Requested Prescriptions     Pending Prescriptions Disp Refills    OLANZapine (ZYPREXA) 10 MG tablet [Pharmacy Med Name: OLANZAPINE 10 MG TABS 10 Tablet] 28 tablet 2     Sig: TAKE 1 TABLET BY MOUTH EVERY NIGHT AT BEDTIME       Last Appointment Date: 1/4/2024  Next Appointment Date: 7/5/2024    Allergies   Allergen Reactions    Pcn [Penicillins]

## 2024-07-08 ENCOUNTER — TELEPHONE (OUTPATIENT)
Dept: FAMILY MEDICINE CLINIC | Age: 49
End: 2024-07-08

## 2024-07-08 NOTE — TELEPHONE ENCOUNTER
Attempted to reach patient regarding missed appointment on 7/5/24. Unable to contact at this time. Unable to leave message. Letter mailed to reschedule.

## 2024-07-12 ENCOUNTER — TELEPHONE (OUTPATIENT)
Dept: FAMILY MEDICINE CLINIC | Age: 49
End: 2024-07-12

## 2024-07-16 ENCOUNTER — HOSPITAL ENCOUNTER (OUTPATIENT)
Age: 49
Discharge: HOME | End: 2024-07-16
Payer: MEDICARE

## 2024-07-16 DIAGNOSIS — E78.2 MIXED HYPERLIPIDEMIA: ICD-10-CM

## 2024-07-16 DIAGNOSIS — E11.9 CONTROLLED TYPE 2 DIABETES MELLITUS WITHOUT COMPLICATION, WITHOUT LONG-TERM CURRENT USE OF INSULIN (HCC): ICD-10-CM

## 2024-07-16 LAB
ALBUMIN SERPL-MCNC: 4.3 G/DL (ref 3.5–5.2)
ALBUMIN/GLOB SERPL: 1.4 {RATIO} (ref 1–2.5)
ALP SERPL-CCNC: 48 U/L (ref 40–129)
ALT SERPL-CCNC: 16 U/L (ref 5–41)
ANION GAP SERPL CALCULATED.3IONS-SCNC: 8 MMOL/L (ref 9–17)
AST SERPL-CCNC: 19 U/L
BASOPHILS # BLD: 0.04 K/UL (ref 0–0.2)
BASOPHILS NFR BLD: 1 % (ref 0–2)
BILIRUB SERPL-MCNC: 0.5 MG/DL (ref 0.3–1.2)
BUN SERPL-MCNC: 14 MG/DL (ref 6–20)
BUN/CREAT SERPL: 20 (ref 9–20)
CALCIUM SERPL-MCNC: 9.6 MG/DL (ref 8.6–10.4)
CHLORIDE SERPL-SCNC: 101 MMOL/L (ref 98–107)
CHOLEST SERPL-MCNC: 118 MG/DL (ref 0–199)
CHOLESTEROL/HDL RATIO: 2
CO2 SERPL-SCNC: 28 MMOL/L (ref 20–31)
CREAT SERPL-MCNC: 0.7 MG/DL (ref 0.7–1.2)
EOSINOPHIL # BLD: 0.04 K/UL (ref 0–0.44)
EOSINOPHILS RELATIVE PERCENT: 1 % (ref 1–4)
ERYTHROCYTE [DISTWIDTH] IN BLOOD BY AUTOMATED COUNT: 12.7 % (ref 11.8–14.4)
EST. AVERAGE GLUCOSE BLD GHB EST-MCNC: 123 MG/DL
GFR, ESTIMATED: >90 ML/MIN/1.73M2
GLUCOSE SERPL-MCNC: 94 MG/DL (ref 70–99)
HBA1C MFR BLD: 5.9 % (ref 4–6)
HCT VFR BLD AUTO: 39 % (ref 40.7–50.3)
HDLC SERPL-MCNC: 60 MG/DL
HGB BLD-MCNC: 13.1 G/DL (ref 13–17)
IMM GRANULOCYTES # BLD AUTO: <0.03 K/UL (ref 0–0.3)
IMM GRANULOCYTES NFR BLD: 0 %
LDLC SERPL CALC-MCNC: 48 MG/DL (ref 0–100)
LYMPHOCYTES NFR BLD: 1.06 K/UL (ref 1.1–3.7)
LYMPHOCYTES RELATIVE PERCENT: 16 % (ref 24–43)
MCH RBC QN AUTO: 30.6 PG (ref 25.2–33.5)
MCHC RBC AUTO-ENTMCNC: 33.6 G/DL (ref 25.2–33.5)
MCV RBC AUTO: 91.1 FL (ref 82.6–102.9)
MONOCYTES NFR BLD: 0.48 K/UL (ref 0.1–1.2)
MONOCYTES NFR BLD: 7 % (ref 3–12)
NEUTROPHILS NFR BLD: 75 % (ref 36–65)
NEUTS SEG NFR BLD: 5.13 K/UL (ref 1.5–8.1)
NRBC BLD-RTO: 0 PER 100 WBC
PLATELET # BLD AUTO: 194 K/UL (ref 138–453)
PMV BLD AUTO: 9.7 FL (ref 8.1–13.5)
POTASSIUM SERPL-SCNC: 4 MMOL/L (ref 3.7–5.3)
PROT SERPL-MCNC: 7.3 G/DL (ref 6.4–8.3)
RBC # BLD AUTO: 4.28 M/UL (ref 4.21–5.77)
SODIUM SERPL-SCNC: 137 MMOL/L (ref 135–144)
TRIGL SERPL-MCNC: 51 MG/DL
VLDLC SERPL CALC-MCNC: 10 MG/DL
WBC OTHER # BLD: 6.8 K/UL (ref 3.5–11.3)

## 2024-07-16 PROCEDURE — 85025 COMPLETE CBC W/AUTO DIFF WBC: CPT

## 2024-07-16 PROCEDURE — 80061 LIPID PANEL: CPT

## 2024-07-16 PROCEDURE — 36415 COLL VENOUS BLD VENIPUNCTURE: CPT

## 2024-07-16 PROCEDURE — 80053 COMPREHEN METABOLIC PANEL: CPT

## 2024-07-16 PROCEDURE — 83036 HEMOGLOBIN GLYCOSYLATED A1C: CPT

## 2024-07-18 ENCOUNTER — OFFICE VISIT (OUTPATIENT)
Dept: FAMILY MEDICINE CLINIC | Age: 49
End: 2024-07-18

## 2024-07-18 VITALS
BODY MASS INDEX: 22.91 KG/M2 | TEMPERATURE: 97 F | DIASTOLIC BLOOD PRESSURE: 86 MMHG | WEIGHT: 146 LBS | OXYGEN SATURATION: 98 % | SYSTOLIC BLOOD PRESSURE: 130 MMHG | RESPIRATION RATE: 18 BRPM | HEART RATE: 108 BPM | HEIGHT: 67 IN

## 2024-07-18 DIAGNOSIS — F41.9 ANXIETY: Primary | ICD-10-CM

## 2024-07-18 DIAGNOSIS — E78.2 MIXED HYPERLIPIDEMIA: ICD-10-CM

## 2024-07-18 DIAGNOSIS — Z12.5 SCREENING FOR PROSTATE CANCER: ICD-10-CM

## 2024-07-18 DIAGNOSIS — E11.9 CONTROLLED TYPE 2 DIABETES MELLITUS WITHOUT COMPLICATION, WITHOUT LONG-TERM CURRENT USE OF INSULIN (HCC): ICD-10-CM

## 2024-07-18 DIAGNOSIS — F84.0 AUTISM: ICD-10-CM

## 2024-07-18 RX ORDER — OLANZAPINE 15 MG/1
15 TABLET ORAL NIGHTLY
Qty: 90 TABLET | Refills: 1 | Status: SHIPPED | OUTPATIENT
Start: 2024-07-18

## 2024-07-18 SDOH — ECONOMIC STABILITY: FOOD INSECURITY: WITHIN THE PAST 12 MONTHS, THE FOOD YOU BOUGHT JUST DIDN'T LAST AND YOU DIDN'T HAVE MONEY TO GET MORE.: NEVER TRUE

## 2024-07-18 SDOH — ECONOMIC STABILITY: FOOD INSECURITY: WITHIN THE PAST 12 MONTHS, YOU WORRIED THAT YOUR FOOD WOULD RUN OUT BEFORE YOU GOT MONEY TO BUY MORE.: NEVER TRUE

## 2024-07-18 SDOH — ECONOMIC STABILITY: INCOME INSECURITY: HOW HARD IS IT FOR YOU TO PAY FOR THE VERY BASICS LIKE FOOD, HOUSING, MEDICAL CARE, AND HEATING?: NOT HARD AT ALL

## 2024-07-18 ASSESSMENT — ENCOUNTER SYMPTOMS
EYE REDNESS: 0
SHORTNESS OF BREATH: 0
RHINORRHEA: 0
VOMITING: 0
ABDOMINAL PAIN: 0
SORE THROAT: 0
NAUSEA: 0
EYE DISCHARGE: 0
SINUS PRESSURE: 0
WHEEZING: 0
COUGH: 0
TROUBLE SWALLOWING: 0
CONSTIPATION: 0
DIARRHEA: 0

## 2024-07-18 NOTE — PATIENT INSTRUCTIONS
calculated using previous equations.  The CKD-EPI equation is less accurate in patients with extremes of muscle mass, extra-renal   metabolism of creatine, excessive creatine ingestion, or following therapy that affects   renal tubular secretion.      BUN/Creatinine Ratio 07/16/2024 20  9 - 20 Final    Calcium 07/16/2024 9.6  8.6 - 10.4 mg/dL Final    Total Protein 07/16/2024 7.3  6.4 - 8.3 g/dL Final    Albumin 07/16/2024 4.3  3.5 - 5.2 g/dL Final    Albumin/Globulin Ratio 07/16/2024 1.4  1.0 - 2.5 Final    Total Bilirubin 07/16/2024 0.5  0.3 - 1.2 mg/dL Final    Alkaline Phosphatase 07/16/2024 48  40 - 129 U/L Final    ALT 07/16/2024 16  5 - 41 U/L Final    AST 07/16/2024 19  <40 U/L Final    WBC 07/16/2024 6.8  3.5 - 11.3 k/uL Final    RBC 07/16/2024 4.28  4.21 - 5.77 m/uL Final    Hemoglobin 07/16/2024 13.1  13.0 - 17.0 g/dL Final    Hematocrit 07/16/2024 39.0 (L)  40.7 - 50.3 % Final    MCV 07/16/2024 91.1  82.6 - 102.9 fL Final    MCH 07/16/2024 30.6  25.2 - 33.5 pg Final    MCHC 07/16/2024 33.6 (H)  25.2 - 33.5 g/dL Final    RDW 07/16/2024 12.7  11.8 - 14.4 % Final    Platelets 07/16/2024 194  138 - 453 k/uL Final    MPV 07/16/2024 9.7  8.1 - 13.5 fL Final    NRBC Automated 07/16/2024 0.0  0.0 per 100 WBC Final    Neutrophils % 07/16/2024 75 (H)  36 - 65 % Final    Lymphocytes % 07/16/2024 16 (L)  24 - 43 % Final    Monocytes % 07/16/2024 7  3 - 12 % Final    Eosinophils % 07/16/2024 1  1 - 4 % Final    Basophils % 07/16/2024 1  0 - 2 % Final    Immature Granulocytes % 07/16/2024 0  0 % Final    Neutrophils Absolute 07/16/2024 5.13  1.50 - 8.10 k/uL Final    Lymphocytes Absolute 07/16/2024 1.06 (L)  1.10 - 3.70 k/uL Final    Monocytes Absolute 07/16/2024 0.48  0.10 - 1.20 k/uL Final    Eosinophils Absolute 07/16/2024 0.04  0.00 - 0.44 k/uL Final    Basophils Absolute 07/16/2024 0.04  0.00 - 0.20 k/uL Final    Immature Granulocytes Absolute 07/16/2024 <0.03  0.00 - 0.30 k/uL Final

## 2024-07-18 NOTE — PROGRESS NOTES
Accompanied by caregiver Vinay  
and external ear normal.      Left Ear: Tympanic membrane, ear canal and external ear normal.      Nose: Nose normal.      Mouth/Throat:      Mouth: Mucous membranes are moist.      Pharynx: Oropharynx is clear. No oropharyngeal exudate.   Eyes:      General:         Right eye: No discharge.         Left eye: No discharge.      Conjunctiva/sclera: Conjunctivae normal.      Pupils: Pupils are equal, round, and reactive to light.   Neck:      Thyroid: No thyromegaly.   Cardiovascular:      Rate and Rhythm: Normal rate and regular rhythm.      Heart sounds: Normal heart sounds.   Pulmonary:      Effort: Pulmonary effort is normal.      Breath sounds: Normal breath sounds. No wheezing or rales.   Abdominal:      General: Bowel sounds are normal. There is no distension.      Palpations: Abdomen is soft.      Tenderness: There is no abdominal tenderness.   Musculoskeletal:      Cervical back: Normal range of motion and neck supple.   Lymphadenopathy:      Cervical: No cervical adenopathy.   Skin:     General: Skin is warm and dry.      Findings: No rash.   Neurological:      Mental Status: He is alert and oriented to person, place, and time.   Psychiatric:         Behavior: Behavior normal.         Thought Content: Thought content normal.         Judgment: Judgment normal.           ASSESSMENT/PLAN:  Encounter Diagnoses   Name Primary?    Controlled type 2 diabetes mellitus without complication, without long-term current use of insulin (HCC)     Mixed hyperlipidemia     Autism     Anxiety Yes    Screening for prostate cancer      Orders Placed This Encounter   Medications    OLANZapine (ZYPREXA) 15 MG tablet     Sig: Take 1 tablet by mouth nightly     Dispense:  90 tablet     Refill:  1     Orders Placed This Encounter   Procedures    CBC with Auto Differential     Standing Status:   Future     Standing Expiration Date:   7/18/2025    Comprehensive Metabolic Panel     Standing Status:   Future     Standing Expiration

## 2024-08-19 RX ORDER — ATORVASTATIN CALCIUM 40 MG/1
TABLET, FILM COATED ORAL
Qty: 90 TABLET | Refills: 1 | Status: SHIPPED | OUTPATIENT
Start: 2024-08-19

## 2024-12-01 RX ORDER — OXYBUTYNIN CHLORIDE 10 MG/1
TABLET, EXTENDED RELEASE ORAL
Qty: 90 TABLET | Refills: 1 | Status: SHIPPED | OUTPATIENT
Start: 2024-12-01

## 2024-12-01 RX ORDER — OLANZAPINE 15 MG/1
15 TABLET ORAL NIGHTLY
Qty: 90 TABLET | Refills: 1 | Status: SHIPPED | OUTPATIENT
Start: 2024-12-01

## 2025-01-20 ENCOUNTER — HOSPITAL ENCOUNTER (OUTPATIENT)
Age: 50
Discharge: HOME | End: 2025-01-20
Payer: MEDICARE

## 2025-01-20 DIAGNOSIS — E11.9 CONTROLLED TYPE 2 DIABETES MELLITUS WITHOUT COMPLICATION, WITHOUT LONG-TERM CURRENT USE OF INSULIN (HCC): ICD-10-CM

## 2025-01-20 DIAGNOSIS — E78.2 MIXED HYPERLIPIDEMIA: ICD-10-CM

## 2025-01-20 DIAGNOSIS — Z12.5 SCREENING FOR PROSTATE CANCER: ICD-10-CM

## 2025-01-20 LAB
ALBUMIN SERPL-MCNC: 4.4 G/DL (ref 3.5–5.2)
ALBUMIN/GLOB SERPL: 1.6 {RATIO} (ref 1–2.5)
ALP SERPL-CCNC: 51 U/L (ref 40–129)
ALT SERPL-CCNC: 14 U/L (ref 5–41)
ANION GAP SERPL CALCULATED.3IONS-SCNC: 10 MMOL/L (ref 9–17)
AST SERPL-CCNC: 20 U/L
BASOPHILS # BLD: 0.04 K/UL (ref 0–0.2)
BASOPHILS NFR BLD: 1 % (ref 0–2)
BILIRUB SERPL-MCNC: 0.5 MG/DL (ref 0.3–1.2)
BUN SERPL-MCNC: 9 MG/DL (ref 6–20)
BUN/CREAT SERPL: 13 (ref 9–20)
CALCIUM SERPL-MCNC: 9.7 MG/DL (ref 8.6–10.4)
CHLORIDE SERPL-SCNC: 105 MMOL/L (ref 98–107)
CHOLEST SERPL-MCNC: 114 MG/DL (ref 0–199)
CHOLESTEROL/HDL RATIO: 1.9
CO2 SERPL-SCNC: 25 MMOL/L (ref 20–31)
CREAT SERPL-MCNC: 0.7 MG/DL (ref 0.7–1.2)
EOSINOPHIL # BLD: 0.07 K/UL (ref 0–0.44)
EOSINOPHILS RELATIVE PERCENT: 1 % (ref 1–4)
ERYTHROCYTE [DISTWIDTH] IN BLOOD BY AUTOMATED COUNT: 12.7 % (ref 11.8–14.4)
EST. AVERAGE GLUCOSE BLD GHB EST-MCNC: 123 MG/DL
GFR, ESTIMATED: >90 ML/MIN/1.73M2
GLUCOSE SERPL-MCNC: 91 MG/DL (ref 70–99)
HBA1C MFR BLD: 5.9 % (ref 4–6)
HCT VFR BLD AUTO: 40.6 % (ref 40.7–50.3)
HDLC SERPL-MCNC: 61 MG/DL
HGB BLD-MCNC: 13.9 G/DL (ref 13–17)
IMM GRANULOCYTES # BLD AUTO: <0.03 K/UL (ref 0–0.3)
IMM GRANULOCYTES NFR BLD: 0 %
LDLC SERPL CALC-MCNC: 43 MG/DL (ref 0–100)
LYMPHOCYTES NFR BLD: 1.04 K/UL (ref 1.1–3.7)
LYMPHOCYTES RELATIVE PERCENT: 17 % (ref 24–43)
MCH RBC QN AUTO: 30.9 PG (ref 25.2–33.5)
MCHC RBC AUTO-ENTMCNC: 34.2 G/DL (ref 25.2–33.5)
MCV RBC AUTO: 90.2 FL (ref 82.6–102.9)
MONOCYTES NFR BLD: 0.4 K/UL (ref 0.1–1.2)
MONOCYTES NFR BLD: 7 % (ref 3–12)
NEUTROPHILS NFR BLD: 74 % (ref 36–65)
NEUTS SEG NFR BLD: 4.57 K/UL (ref 1.5–8.1)
NRBC BLD-RTO: 0 PER 100 WBC
PLATELET # BLD AUTO: 207 K/UL (ref 138–453)
PMV BLD AUTO: 10 FL (ref 8.1–13.5)
POTASSIUM SERPL-SCNC: 3.9 MMOL/L (ref 3.7–5.3)
PROT SERPL-MCNC: 7.2 G/DL (ref 6.4–8.3)
PSA SERPL-MCNC: 0.47 NG/ML (ref 0–4)
RBC # BLD AUTO: 4.5 M/UL (ref 4.21–5.77)
SODIUM SERPL-SCNC: 140 MMOL/L (ref 135–144)
TRIGL SERPL-MCNC: 49 MG/DL
VLDLC SERPL CALC-MCNC: 10 MG/DL (ref 1–30)
WBC OTHER # BLD: 6.1 K/UL (ref 3.5–11.3)

## 2025-01-20 PROCEDURE — G0103 PSA SCREENING: HCPCS

## 2025-01-20 PROCEDURE — 80053 COMPREHEN METABOLIC PANEL: CPT

## 2025-01-20 PROCEDURE — 36415 COLL VENOUS BLD VENIPUNCTURE: CPT

## 2025-01-20 PROCEDURE — 83036 HEMOGLOBIN GLYCOSYLATED A1C: CPT

## 2025-01-20 PROCEDURE — 85025 COMPLETE CBC W/AUTO DIFF WBC: CPT

## 2025-01-20 PROCEDURE — 80061 LIPID PANEL: CPT

## 2025-01-30 NOTE — TELEPHONE ENCOUNTER
Jaun called requesting a refill of the below medication which has been pended for you: left voicemail for caretaker to call back to reschedule the appt we had to cancel for Jan and Feb    Requested Prescriptions     Pending Prescriptions Disp Refills    atorvastatin (LIPITOR) 40 MG tablet [Pharmacy Med Name: Atorvastatin Calcium 40 MG Tablet] 5 tablet 10     Sig: TAKE ONE TABLET BY MOUTH DAILY (CHOLESTEROL)       Last Appointment Date: 7/18/2024  Next Appointment Date: Visit date not found    Allergies   Allergen Reactions    Pcn [Penicillins]

## 2025-01-31 RX ORDER — ATORVASTATIN CALCIUM 40 MG/1
TABLET, FILM COATED ORAL
Qty: 90 TABLET | Refills: 1 | Status: SHIPPED | OUTPATIENT
Start: 2025-01-31

## 2025-02-26 ENCOUNTER — OFFICE VISIT (OUTPATIENT)
Dept: FAMILY MEDICINE CLINIC | Age: 50
End: 2025-02-26
Payer: MEDICARE

## 2025-02-26 VITALS
RESPIRATION RATE: 16 BRPM | TEMPERATURE: 97 F | WEIGHT: 144.8 LBS | BODY MASS INDEX: 22.73 KG/M2 | HEART RATE: 92 BPM | DIASTOLIC BLOOD PRESSURE: 66 MMHG | HEIGHT: 67 IN | OXYGEN SATURATION: 97 % | SYSTOLIC BLOOD PRESSURE: 100 MMHG

## 2025-02-26 DIAGNOSIS — E11.9 CONTROLLED TYPE 2 DIABETES MELLITUS WITHOUT COMPLICATION, WITHOUT LONG-TERM CURRENT USE OF INSULIN (HCC): Primary | ICD-10-CM

## 2025-02-26 DIAGNOSIS — Z23 NEED FOR VACCINATION: ICD-10-CM

## 2025-02-26 DIAGNOSIS — F41.9 ANXIETY: ICD-10-CM

## 2025-02-26 DIAGNOSIS — E78.2 MIXED HYPERLIPIDEMIA: ICD-10-CM

## 2025-02-26 DIAGNOSIS — F84.0 AUTISM: ICD-10-CM

## 2025-02-26 PROCEDURE — 90656 IIV3 VACC NO PRSV 0.5 ML IM: CPT | Performed by: FAMILY MEDICINE

## 2025-02-26 RX ORDER — BUSPIRONE HYDROCHLORIDE 30 MG/1
30 TABLET ORAL 2 TIMES DAILY
COMMUNITY
Start: 2025-01-02 | End: 2026-01-02

## 2025-02-26 RX ORDER — FLUOXETINE 10 MG/1
1 CAPSULE ORAL EVERY MORNING
COMMUNITY
Start: 2025-01-23

## 2025-02-26 SDOH — ECONOMIC STABILITY: FOOD INSECURITY: WITHIN THE PAST 12 MONTHS, THE FOOD YOU BOUGHT JUST DIDN'T LAST AND YOU DIDN'T HAVE MONEY TO GET MORE.: NEVER TRUE

## 2025-02-26 SDOH — ECONOMIC STABILITY: FOOD INSECURITY: WITHIN THE PAST 12 MONTHS, YOU WORRIED THAT YOUR FOOD WOULD RUN OUT BEFORE YOU GOT MONEY TO BUY MORE.: NEVER TRUE

## 2025-02-26 ASSESSMENT — ENCOUNTER SYMPTOMS
SHORTNESS OF BREATH: 0
TROUBLE SWALLOWING: 0
EYE REDNESS: 0
ABDOMINAL PAIN: 0
VOMITING: 0
RHINORRHEA: 0
SORE THROAT: 0
COUGH: 0
DIARRHEA: 0
NAUSEA: 0
SINUS PRESSURE: 0
CONSTIPATION: 0
EYE DISCHARGE: 0
WHEEZING: 0

## 2025-02-26 ASSESSMENT — PATIENT HEALTH QUESTIONNAIRE - PHQ9
SUM OF ALL RESPONSES TO PHQ QUESTIONS 1-9: 0
SUM OF ALL RESPONSES TO PHQ QUESTIONS 1-9: 0
2. FEELING DOWN, DEPRESSED OR HOPELESS: NOT AT ALL
SUM OF ALL RESPONSES TO PHQ QUESTIONS 1-9: 0
SUM OF ALL RESPONSES TO PHQ QUESTIONS 1-9: 0
1. LITTLE INTEREST OR PLEASURE IN DOING THINGS: NOT AT ALL
SUM OF ALL RESPONSES TO PHQ9 QUESTIONS 1 & 2: 0

## 2025-02-26 NOTE — PROGRESS NOTES
2025     Jaun Pierce (:  1975) is a 49 y.o. male, here for evaluation of the following medical concerns:    HPI    History of Present Illness  The patient is a 49-year-old male here for a 6-month follow-up of controlled diabetes type 2, hyperlipidemia, autism, and anxiety. He is accompanied by his caregiver.    He has been maintaining a healthy diet, which has resulted in a slight weight loss. His blood glucose levels have been consistently within the normal range, as monitored by his caregiver on weekends. He has expressed a desire to receive an influenza vaccine today.    His caregiver reports that his anxiety remains a constant concern.    He exhibits frequent urination, which his caregiver attributes to his high water intake, possibly due to his medication. His sleep pattern is reported to be satisfactory.    ALLERGIES  The patient is allergic to PENICILLIN.          Patient's recent lab reports are as follows:    Results for orders placed or performed during the hospital encounter of 25   PSA Screening   Result Value Ref Range    PSA 0.47 0.00 - 4.00 ng/mL   Lipid Panel   Result Value Ref Range    Cholesterol, Total 114 0 - 199 mg/dL    HDL 61 >40 mg/dL    LDL Cholesterol 43 0 - 100 mg/dL    Chol/HDL Ratio 1.9     Triglycerides 49 <150 mg/dL    VLDL 10 1 - 30 mg/dL   Hemoglobin A1C   Result Value Ref Range    Hemoglobin A1C 5.9 4.0 - 6.0 %    Estimated Avg Glucose 123 mg/dL   Comprehensive Metabolic Panel   Result Value Ref Range    Sodium 140 135 - 144 mmol/L    Potassium 3.9 3.7 - 5.3 mmol/L    Chloride 105 98 - 107 mmol/L    CO2 25 20 - 31 mmol/L    Anion Gap 10 9 - 17 mmol/L    Glucose 91 70 - 99 mg/dL    BUN 9 6 - 20 mg/dL    Creatinine 0.7 0.7 - 1.2 mg/dL    Est, Glom Filt Rate >90 >60 mL/min/1.73m2    BUN/Creatinine Ratio 13 9 - 20    Calcium 9.7 8.6 - 10.4 mg/dL    Total Protein 7.2 6.4 - 8.3 g/dL    Albumin 4.4 3.5 - 5.2 g/dL    Albumin/Globulin Ratio 1.6 1.0 - 2.5    Total

## 2025-02-26 NOTE — PATIENT INSTRUCTIONS
Hospital Outpatient Visit on 01/20/2025   Component Date Value Ref Range Status    PSA 01/20/2025 0.47  0.00 - 4.00 ng/mL Final    Comment: The Roche \"ECLIA\" assay is used.  Results obtained with different assay methods cannot be   used interchangeably.      Cholesterol, Total 01/20/2025 114  0 - 199 mg/dL Final    Comment:    Cholesterol Guidelines:      <200  Desirable   200-240  Borderline      >240  Undesirable         HDL 01/20/2025 61  >40 mg/dL Final    Comment:    HDL Guidelines:    <40     Undesirable   40-59    Borderline    >59     Desirable         LDL Cholesterol 01/20/2025 43  0 - 100 mg/dL Final    Comment:    LDL Guidelines:     <100    Desirable   100-129   Near to/above Desirable   130-159   Borderline      >159   Undesirable     Direct (measured) LDL and calculated LDL are not interchangeable tests.      Chol/HDL Ratio 01/20/2025 1.9   Final    Triglycerides 01/20/2025 49  <150 mg/dL Final    Comment:    Triglyceride Guidelines:     <150   Desirable   150-199  Borderline   200-499  High     >499   Very high   Based on AHA Guidelines for fasting triglyceride, October 2012.         VLDL 01/20/2025 10  1 - 30 mg/dL Final    Hemoglobin A1C 01/20/2025 5.9  4.0 - 6.0 % Final    Estimated Avg Glucose 01/20/2025 123  mg/dL Final    Comment: The ADA and AACC recommend providing the estimated average glucose result to permit better   patient understanding of their HBA1c result.      Sodium 01/20/2025 140  135 - 144 mmol/L Final    Potassium 01/20/2025 3.9  3.7 - 5.3 mmol/L Final    Chloride 01/20/2025 105  98 - 107 mmol/L Final    CO2 01/20/2025 25  20 - 31 mmol/L Final    Anion Gap 01/20/2025 10  9 - 17 mmol/L Final    Glucose 01/20/2025 91  70 - 99 mg/dL Final    BUN 01/20/2025 9  6 - 20 mg/dL Final    Creatinine 01/20/2025 0.7  0.7 - 1.2 mg/dL Final    Est, Glom Filt Rate 01/20/2025 >90  >60 mL/min/1.73m2 Final    Comment:       These results are not intended for use in patients <18 years of age.

## 2025-07-26 ENCOUNTER — HOSPITAL ENCOUNTER (EMERGENCY)
Age: 50
Discharge: HOME | End: 2025-07-26
Attending: EMERGENCY MEDICINE
Payer: MEDICARE

## 2025-07-26 ENCOUNTER — APPOINTMENT (OUTPATIENT)
Dept: CT IMAGING | Age: 50
End: 2025-07-26
Payer: MEDICARE

## 2025-07-26 VITALS
BODY MASS INDEX: 22.71 KG/M2 | TEMPERATURE: 98.6 F | HEART RATE: 98 BPM | WEIGHT: 145 LBS | OXYGEN SATURATION: 99 % | RESPIRATION RATE: 20 BRPM | SYSTOLIC BLOOD PRESSURE: 113 MMHG | DIASTOLIC BLOOD PRESSURE: 73 MMHG

## 2025-07-26 DIAGNOSIS — R56.9 SEIZURE-LIKE ACTIVITY (HCC): Primary | ICD-10-CM

## 2025-07-26 LAB
ALBUMIN SERPL-MCNC: 4.3 G/DL (ref 3.5–5.2)
ALBUMIN/GLOB SERPL: 1.5 {RATIO} (ref 1–2.5)
ALP SERPL-CCNC: 50 U/L (ref 40–129)
ALT SERPL-CCNC: 26 U/L (ref 10–50)
ANION GAP SERPL CALCULATED.3IONS-SCNC: 11 MMOL/L (ref 9–16)
AST SERPL-CCNC: 36 U/L (ref 10–50)
BACTERIA URNS QL MICRO: ABNORMAL
BASOPHILS # BLD: 0.07 K/UL (ref 0–0.2)
BASOPHILS NFR BLD: 1 % (ref 0–2)
BILIRUB SERPL-MCNC: 0.4 MG/DL (ref 0–1.2)
BILIRUB UR QL STRIP: NEGATIVE
BUN SERPL-MCNC: 13 MG/DL (ref 6–20)
BUN/CREAT SERPL: 19 (ref 9–20)
CALCIUM SERPL-MCNC: 9.5 MG/DL (ref 8.6–10.4)
CHARACTER UR: ABNORMAL
CHLORIDE SERPL-SCNC: 101 MMOL/L (ref 98–107)
CLARITY UR: ABNORMAL
CO2 SERPL-SCNC: 25 MMOL/L (ref 20–31)
COLOR UR: YELLOW
CREAT SERPL-MCNC: 0.7 MG/DL (ref 0.7–1.2)
EKG ATRIAL RATE: 119 BPM
EKG P AXIS: 56 DEGREES
EKG P-R INTERVAL: 174 MS
EKG Q-T INTERVAL: 292 MS
EKG QRS DURATION: 82 MS
EKG QTC CALCULATION (BAZETT): 410 MS
EKG R AXIS: 70 DEGREES
EKG T AXIS: -17 DEGREES
EKG VENTRICULAR RATE: 119 BPM
EOSINOPHIL # BLD: <0.03 K/UL (ref 0–0.44)
EOSINOPHILS RELATIVE PERCENT: 0 % (ref 1–4)
EPI CELLS #/AREA URNS HPF: ABNORMAL /HPF (ref 0–5)
ERYTHROCYTE [DISTWIDTH] IN BLOOD BY AUTOMATED COUNT: 12.6 % (ref 11.8–14.4)
GFR, ESTIMATED: >90 ML/MIN/1.73M2
GLUCOSE SERPL-MCNC: 90 MG/DL (ref 74–99)
GLUCOSE UR STRIP-MCNC: NEGATIVE MG/DL
HCT VFR BLD AUTO: 40.5 % (ref 40.7–50.3)
HGB BLD-MCNC: 13.6 G/DL (ref 13–17)
HGB UR QL STRIP.AUTO: ABNORMAL
IMM GRANULOCYTES # BLD AUTO: 0.45 K/UL (ref 0–0.3)
IMM GRANULOCYTES NFR BLD: 4 %
KETONES UR STRIP-MCNC: NEGATIVE MG/DL
LACTATE BLDV-SCNC: 1.2 MMOL/L (ref 0.5–2.2)
LEUKOCYTE ESTERASE UR QL STRIP: ABNORMAL
LYMPHOCYTES NFR BLD: 1.25 K/UL (ref 1.1–3.7)
LYMPHOCYTES RELATIVE PERCENT: 10 % (ref 24–43)
MAGNESIUM SERPL-MCNC: 1.9 MG/DL (ref 1.6–2.6)
MCH RBC QN AUTO: 31 PG (ref 25.2–33.5)
MCHC RBC AUTO-ENTMCNC: 33.6 G/DL (ref 25.2–33.5)
MCV RBC AUTO: 92.3 FL (ref 82.6–102.9)
MONOCYTES NFR BLD: 0.65 K/UL (ref 0.1–1.2)
MONOCYTES NFR BLD: 5 % (ref 3–12)
NEUTROPHILS NFR BLD: 80 % (ref 36–65)
NEUTS SEG NFR BLD: 10.15 K/UL (ref 1.5–8.1)
NITRITE UR QL STRIP: NEGATIVE
NRBC BLD-RTO: 0 PER 100 WBC
PH UR STRIP: 6 [PH] (ref 5–6)
PLATELET # BLD AUTO: 198 K/UL (ref 138–453)
PMV BLD AUTO: 10 FL (ref 8.1–13.5)
POTASSIUM SERPL-SCNC: 3.9 MMOL/L (ref 3.7–5.3)
PROT SERPL-MCNC: 7.2 G/DL (ref 6.6–8.7)
PROT UR STRIP-MCNC: ABNORMAL MG/DL
RBC # BLD AUTO: 4.39 M/UL (ref 4.21–5.77)
RBC #/AREA URNS HPF: ABNORMAL /HPF (ref 0–4)
SODIUM SERPL-SCNC: 137 MMOL/L (ref 136–145)
SP GR UR STRIP: 1.02 (ref 1.01–1.02)
SPERM, URINE: ABNORMAL
TROPONIN I SERPL HS-MCNC: <6 NG/L (ref 0–22)
TROPONIN I SERPL HS-MCNC: <6 NG/L (ref 0–22)
TSH SERPL DL<=0.05 MIU/L-ACNC: 0.71 UIU/ML (ref 0.27–4.2)
UROBILINOGEN UR STRIP-ACNC: NORMAL EU/DL (ref 0–1)
WBC #/AREA URNS HPF: ABNORMAL /HPF (ref 0–4)
WBC OTHER # BLD: 12.6 K/UL (ref 3.5–11.3)

## 2025-07-26 PROCEDURE — 84484 ASSAY OF TROPONIN QUANT: CPT

## 2025-07-26 PROCEDURE — 84443 ASSAY THYROID STIM HORMONE: CPT

## 2025-07-26 PROCEDURE — 36415 COLL VENOUS BLD VENIPUNCTURE: CPT

## 2025-07-26 PROCEDURE — 80053 COMPREHEN METABOLIC PANEL: CPT

## 2025-07-26 PROCEDURE — 96374 THER/PROPH/DIAG INJ IV PUSH: CPT

## 2025-07-26 PROCEDURE — 2580000003 HC RX 258: Performed by: EMERGENCY MEDICINE

## 2025-07-26 PROCEDURE — 85025 COMPLETE CBC W/AUTO DIFF WBC: CPT

## 2025-07-26 PROCEDURE — 70450 CT HEAD/BRAIN W/O DYE: CPT

## 2025-07-26 PROCEDURE — 83605 ASSAY OF LACTIC ACID: CPT

## 2025-07-26 PROCEDURE — 83735 ASSAY OF MAGNESIUM: CPT

## 2025-07-26 PROCEDURE — 6370000000 HC RX 637 (ALT 250 FOR IP): Performed by: EMERGENCY MEDICINE

## 2025-07-26 PROCEDURE — 99284 EMERGENCY DEPT VISIT MOD MDM: CPT

## 2025-07-26 PROCEDURE — 6360000002 HC RX W HCPCS: Performed by: EMERGENCY MEDICINE

## 2025-07-26 PROCEDURE — 81001 URINALYSIS AUTO W/SCOPE: CPT

## 2025-07-26 RX ORDER — 0.9 % SODIUM CHLORIDE 0.9 %
1000 INTRAVENOUS SOLUTION INTRAVENOUS ONCE
Status: COMPLETED | OUTPATIENT
Start: 2025-07-26 | End: 2025-07-26

## 2025-07-26 RX ORDER — LEVETIRACETAM 500 MG/5ML
1000 INJECTION, SOLUTION, CONCENTRATE INTRAVENOUS ONCE
Status: COMPLETED | OUTPATIENT
Start: 2025-07-26 | End: 2025-07-26

## 2025-07-26 RX ORDER — LEVETIRACETAM 500 MG/1
500 TABLET ORAL 2 TIMES DAILY
Qty: 60 TABLET | Refills: 0 | Status: SHIPPED | OUTPATIENT
Start: 2025-07-26

## 2025-07-26 RX ORDER — ACETAMINOPHEN 500 MG
1000 TABLET ORAL ONCE
Status: COMPLETED | OUTPATIENT
Start: 2025-07-26 | End: 2025-07-26

## 2025-07-26 RX ADMIN — LEVETIRACETAM 1000 MG: 100 INJECTION INTRAVENOUS at 15:57

## 2025-07-26 RX ADMIN — SODIUM CHLORIDE 1000 ML: 9 INJECTION, SOLUTION INTRAVENOUS at 15:55

## 2025-07-26 RX ADMIN — ACETAMINOPHEN 1000 MG: 500 TABLET ORAL at 16:10

## 2025-07-26 ASSESSMENT — PAIN - FUNCTIONAL ASSESSMENT: PAIN_FUNCTIONAL_ASSESSMENT: ADULT NONVERBAL PAIN SCALE (NPVS)

## 2025-07-26 NOTE — ED PROVIDER NOTES
[facility] EMERGENCY DEPARTMENT  EMERGENCY DEPARTMENT ENCOUNTER      Pt Name: Jaun Pierce  MRN: [id number]  Birthdate 1975  Date of evaluation: 7/26/2025  Provider: Tee Bradley MD    CHIEF COMPLAINT     Chief Complaint   Patient presents with    Seizures         HISTORY OF PRESENT ILLNESS   (Location/Symptom, Timing/Onset, Context/Setting,Quality, Duration, Modifying Factors, Severity)  Note limiting factors.   Jaun Pierce is a 49 y.o. male who presents to the emergency department for evaluation of possible seizure activity.  Patient has advanced developmental delay and resides in a group home.  Family member sitting next to him who states that he was coloring in a coloring book when he suddenly stiffened up, his eyes rolled back and he started having rhythmic jerks of the arms and fell to the side.  This lasted about a minute when he was frothing at the mouth after which he was very poorly responsive for a short while before regaining his normal mentation.  He does not have a history of seizure disorder and no recent infectious symptoms are reported.    The history is provided by a relative and medical records.       Nursing Notes werereviewed.    REVIEW OF SYSTEMS    (2-9 systems for level 4, 10 or more for level 5)     Review of Systems   Unable to perform ROS: Dementia       Except as noted above the remainder of the review of systems was reviewed and negative.       PAST MEDICAL HISTORY     Past Medical History:   Diagnosis Date    Autism     Does not communicate    Obesity     Skin lesions     Both arms with scarring secondary to biting. This is a behavior/emotional problem.         SURGICALHISTORY       Past Surgical History:   Procedure Laterality Date    COLONOSCOPY N/A 7/8/2021    COLONOSCOPY performed by Jb Camarena DO at [facility] OR         CURRENT MEDICATIONS       Previous Medications    ATORVASTATIN (LIPITOR) 40 MG TABLET    TAKE ONE TABLET BY MOUTH DAILY (CHOLESTEROL)    BUSPIRONE

## 2025-07-29 LAB
EKG ATRIAL RATE: 119 BPM
EKG P AXIS: 56 DEGREES
EKG P-R INTERVAL: 174 MS
EKG Q-T INTERVAL: 292 MS
EKG QRS DURATION: 82 MS
EKG QTC CALCULATION (BAZETT): 410 MS
EKG R AXIS: 70 DEGREES
EKG T AXIS: -17 DEGREES
EKG VENTRICULAR RATE: 119 BPM

## 2025-07-29 RX ORDER — OXYBUTYNIN CHLORIDE 10 MG/1
TABLET, EXTENDED RELEASE ORAL
Qty: 31 TABLET | Refills: 10 | Status: SHIPPED | OUTPATIENT
Start: 2025-07-29

## 2025-07-29 NOTE — TELEPHONE ENCOUNTER
Jaun called requesting a refill of the below medication which has been pended for you:     Requested Prescriptions     Pending Prescriptions Disp Refills    oxyBUTYnin (DITROPAN-XL) 10 MG extended release tablet [Pharmacy Med Name: oxyBUTYnin Chloride ER 10 MG Tablet extended release 24 hr] 31 tablet 10     Sig: TAKE ONE TABLET BY MOUTH DAILY (BLAD[facility] HEALTH) *DO NOT CRUSH/CHEW*       Last Appointment Date: 2/26/2025  Next Appointment Date: 8/6/2025    Allergies   Allergen Reactions    Pcn [Penicillins]

## 2025-08-06 ENCOUNTER — OFFICE VISIT (OUTPATIENT)
Dept: FAMILY MEDICINE CLINIC | Age: 50
End: 2025-08-06

## 2025-08-06 VITALS
WEIGHT: 142 LBS | BODY MASS INDEX: 22.29 KG/M2 | HEIGHT: 67 IN | RESPIRATION RATE: 18 BRPM | OXYGEN SATURATION: 98 % | DIASTOLIC BLOOD PRESSURE: 80 MMHG | HEART RATE: 108 BPM | SYSTOLIC BLOOD PRESSURE: 124 MMHG | TEMPERATURE: 97 F

## 2025-08-06 DIAGNOSIS — L21.9 SEBORRHEIC DERMATITIS: ICD-10-CM

## 2025-08-06 DIAGNOSIS — F84.0 AUTISM: ICD-10-CM

## 2025-08-06 DIAGNOSIS — F41.9 ANXIETY: ICD-10-CM

## 2025-08-06 DIAGNOSIS — R56.9 SEIZURE (HCC): Primary | ICD-10-CM

## 2025-08-06 RX ORDER — KETOCONAZOLE 20 MG/G
CREAM TOPICAL
Qty: 45 G | Refills: 1 | Status: SHIPPED | OUTPATIENT
Start: 2025-08-06

## 2025-08-06 RX ORDER — HYDROXYZINE HYDROCHLORIDE 10 MG/1
10 TABLET, FILM COATED ORAL 3 TIMES DAILY PRN
COMMUNITY

## 2025-08-06 RX ORDER — KETOCONAZOLE 20 MG/ML
SHAMPOO, SUSPENSION TOPICAL
Qty: 120 ML | Refills: 2 | Status: SHIPPED | OUTPATIENT
Start: 2025-08-06

## 2025-08-06 ASSESSMENT — ENCOUNTER SYMPTOMS
SORE THROAT: 0
VOMITING: 0
ABDOMINAL PAIN: 0
DIARRHEA: 0
CONSTIPATION: 0
NAUSEA: 0
TROUBLE SWALLOWING: 0
SINUS PRESSURE: 0
SHORTNESS OF BREATH: 0
EYE DISCHARGE: 0
COUGH: 0
EYE REDNESS: 0
RHINORRHEA: 0
WHEEZING: 0

## 2025-08-20 ENCOUNTER — HOSPITAL ENCOUNTER (OUTPATIENT)
Dept: LAB | Age: 50
Discharge: HOME | End: 2025-08-20
Payer: MEDICARE

## 2025-08-20 DIAGNOSIS — E11.9 CONTROLLED TYPE 2 DIABETES MELLITUS WITHOUT COMPLICATION, WITHOUT LONG-TERM CURRENT USE OF INSULIN (HCC): ICD-10-CM

## 2025-08-20 DIAGNOSIS — E78.2 MIXED HYPERLIPIDEMIA: ICD-10-CM

## 2025-08-20 LAB
ALBUMIN SERPL-MCNC: 4.4 G/DL (ref 3.5–5.2)
ALBUMIN/GLOB SERPL: 1.4 {RATIO} (ref 1–2.5)
ALP SERPL-CCNC: 96 U/L (ref 40–129)
ALT SERPL-CCNC: 18 U/L (ref 10–50)
ANION GAP SERPL CALCULATED.3IONS-SCNC: 9 MMOL/L (ref 9–16)
AST SERPL-CCNC: 24 U/L (ref 10–50)
BASOPHILS # BLD: 0.04 K/UL (ref 0–0.2)
BASOPHILS NFR BLD: 0 % (ref 0–2)
BILIRUB SERPL-MCNC: 0.4 MG/DL (ref 0–1.2)
BUN SERPL-MCNC: 10 MG/DL (ref 6–20)
BUN/CREAT SERPL: 13 (ref 9–20)
CALCIUM SERPL-MCNC: 9.8 MG/DL (ref 8.6–10.4)
CHLORIDE SERPL-SCNC: 103 MMOL/L (ref 98–107)
CHOLEST SERPL-MCNC: 132 MG/DL (ref 0–199)
CHOLESTEROL/HDL RATIO: 2.2
CO2 SERPL-SCNC: 29 MMOL/L (ref 20–31)
CREAT SERPL-MCNC: 0.8 MG/DL (ref 0.7–1.2)
EOSINOPHIL # BLD: 0.08 K/UL (ref 0–0.44)
EOSINOPHILS RELATIVE PERCENT: 1 % (ref 1–4)
ERYTHROCYTE [DISTWIDTH] IN BLOOD BY AUTOMATED COUNT: 12.6 % (ref 11.8–14.4)
EST. AVERAGE GLUCOSE BLD GHB EST-MCNC: 126 MG/DL
GFR, ESTIMATED: >90 ML/MIN/1.73M2
GLUCOSE SERPL-MCNC: 70 MG/DL (ref 74–99)
HBA1C MFR BLD: 6 % (ref 4–6)
HCT VFR BLD AUTO: 42.8 % (ref 40.7–50.3)
HDLC SERPL-MCNC: 61 MG/DL
HGB BLD-MCNC: 13.9 G/DL (ref 13–17)
IMM GRANULOCYTES # BLD AUTO: 0.03 K/UL (ref 0–0.3)
IMM GRANULOCYTES NFR BLD: 0 %
LDLC SERPL CALC-MCNC: 58 MG/DL (ref 0–100)
LYMPHOCYTES NFR BLD: 0.97 K/UL (ref 1.1–3.7)
LYMPHOCYTES RELATIVE PERCENT: 9 % (ref 24–43)
MCH RBC QN AUTO: 30.7 PG (ref 25.2–33.5)
MCHC RBC AUTO-ENTMCNC: 32.5 G/DL (ref 25.2–33.5)
MCV RBC AUTO: 94.5 FL (ref 82.6–102.9)
MONOCYTES NFR BLD: 0.61 K/UL (ref 0.1–1.2)
MONOCYTES NFR BLD: 6 % (ref 3–12)
NEUTROPHILS NFR BLD: 84 % (ref 36–65)
NEUTS SEG NFR BLD: 9.12 K/UL (ref 1.5–8.1)
NRBC BLD-RTO: 0 PER 100 WBC
PLATELET # BLD AUTO: 244 K/UL (ref 138–453)
PMV BLD AUTO: 9 FL (ref 8.1–13.5)
POTASSIUM SERPL-SCNC: 4 MMOL/L (ref 3.7–5.3)
PROT SERPL-MCNC: 7.6 G/DL (ref 6.6–8.7)
RBC # BLD AUTO: 4.53 M/UL (ref 4.21–5.77)
SODIUM SERPL-SCNC: 141 MMOL/L (ref 136–145)
TRIGL SERPL-MCNC: 67 MG/DL
VLDLC SERPL CALC-MCNC: 13 MG/DL (ref 1–30)
WBC OTHER # BLD: 10.9 K/UL (ref 3.5–11.3)

## 2025-08-20 PROCEDURE — 80061 LIPID PANEL: CPT

## 2025-08-20 PROCEDURE — 85025 COMPLETE CBC W/AUTO DIFF WBC: CPT

## 2025-08-20 PROCEDURE — 83036 HEMOGLOBIN GLYCOSYLATED A1C: CPT

## 2025-08-20 PROCEDURE — 36415 COLL VENOUS BLD VENIPUNCTURE: CPT

## 2025-08-20 PROCEDURE — 80053 COMPREHEN METABOLIC PANEL: CPT

## 2025-09-02 ENCOUNTER — TELEPHONE (OUTPATIENT)
Dept: FAMILY MEDICINE CLINIC | Age: 50
End: 2025-09-02

## (undated) DEVICE — CONNECTOR TBNG AUX H2O JET DISP FOR OLY 160/180 SER

## (undated) DEVICE — LINE SAMP O2 6.5FT W/FEMALE CONN F/ADULT CAPNOLINE PLUS

## (undated) DEVICE — 1200CC GUARDIAN II: Brand: GUARDIAN

## (undated) DEVICE — MERCY DEFIANCE ENDO KIT: Brand: MEDLINE INDUSTRIES, INC.

## (undated) DEVICE — 60 ML SYRINGE REGULAR TIP: Brand: MONOJECT